# Patient Record
Sex: MALE | Race: WHITE | NOT HISPANIC OR LATINO | Employment: OTHER | URBAN - METROPOLITAN AREA
[De-identification: names, ages, dates, MRNs, and addresses within clinical notes are randomized per-mention and may not be internally consistent; named-entity substitution may affect disease eponyms.]

---

## 2017-03-17 ENCOUNTER — DOCUMENTATION ONLY (OUTPATIENT)
Dept: FAMILY MEDICINE | Facility: CLINIC | Age: 77
End: 2017-03-17

## 2019-09-12 ENCOUNTER — OFFICE VISIT (OUTPATIENT)
Dept: FAMILY MEDICINE | Facility: CLINIC | Age: 79
End: 2019-09-12
Payer: MEDICARE

## 2019-09-12 VITALS
DIASTOLIC BLOOD PRESSURE: 86 MMHG | HEART RATE: 60 BPM | SYSTOLIC BLOOD PRESSURE: 138 MMHG | WEIGHT: 240 LBS | HEIGHT: 76 IN | BODY MASS INDEX: 29.22 KG/M2

## 2019-09-12 DIAGNOSIS — N40.0 BENIGN PROSTATIC HYPERPLASIA, UNSPECIFIED WHETHER LOWER URINARY TRACT SYMPTOMS PRESENT: ICD-10-CM

## 2019-09-12 DIAGNOSIS — M15.9 PRIMARY OSTEOARTHRITIS INVOLVING MULTIPLE JOINTS: ICD-10-CM

## 2019-09-12 DIAGNOSIS — Z87.828 HISTORY OF TRAUMATIC INJURY OF HEAD: ICD-10-CM

## 2019-09-12 DIAGNOSIS — Z00.00 WELLNESS EXAMINATION: Primary | ICD-10-CM

## 2019-09-12 PROCEDURE — 99397 PR PREVENTIVE VISIT,EST,65 & OVER: ICD-10-PCS | Mod: S$GLB,,, | Performed by: FAMILY MEDICINE

## 2019-09-12 PROCEDURE — 99397 PER PM REEVAL EST PAT 65+ YR: CPT | Mod: S$GLB,,, | Performed by: FAMILY MEDICINE
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
## 2019-12-18 ENCOUNTER — ANESTHESIA (OUTPATIENT)
Dept: SURGERY | Facility: HOSPITAL | Age: 79
End: 2019-12-18
Payer: MEDICARE

## 2019-12-18 ENCOUNTER — ANESTHESIA EVENT (OUTPATIENT)
Dept: SURGERY | Facility: HOSPITAL | Age: 79
End: 2019-12-18
Payer: MEDICARE

## 2019-12-18 ENCOUNTER — HOSPITAL ENCOUNTER (OUTPATIENT)
Facility: HOSPITAL | Age: 79
Discharge: HOME | End: 2019-12-18
Attending: EMERGENCY MEDICINE | Admitting: FAMILY MEDICINE
Payer: MEDICARE

## 2019-12-18 VITALS
HEIGHT: 77 IN | DIASTOLIC BLOOD PRESSURE: 74 MMHG | SYSTOLIC BLOOD PRESSURE: 144 MMHG | HEART RATE: 62 BPM | TEMPERATURE: 98 F | BODY MASS INDEX: 27.75 KG/M2 | OXYGEN SATURATION: 96 % | WEIGHT: 235 LBS | RESPIRATION RATE: 16 BRPM

## 2019-12-18 DIAGNOSIS — S96.909A: ICD-10-CM

## 2019-12-18 DIAGNOSIS — S91.109A: Primary | ICD-10-CM

## 2019-12-18 DIAGNOSIS — T14.8XXA: ICD-10-CM

## 2019-12-18 DIAGNOSIS — S96.909A: Primary | ICD-10-CM

## 2019-12-18 DIAGNOSIS — T14.8XXA FRACTURE: ICD-10-CM

## 2019-12-18 DIAGNOSIS — S91.109A: ICD-10-CM

## 2019-12-18 LAB
ALBUMIN SERPL BCP-MCNC: 4.3 G/DL (ref 3.5–5.2)
ALP SERPL-CCNC: 63 U/L (ref 55–135)
ALT SERPL W/O P-5'-P-CCNC: 24 U/L (ref 10–44)
ANION GAP SERPL CALC-SCNC: 7 MMOL/L (ref 8–16)
APTT PPP: 32.6 SEC (ref 23.6–33.3)
AST SERPL-CCNC: 28 U/L (ref 10–40)
BASOPHILS # BLD AUTO: 0.05 K/UL (ref 0–0.2)
BASOPHILS NFR BLD: 1.1 % (ref 0–1.9)
BILIRUB SERPL-MCNC: 1.3 MG/DL (ref 0.1–1)
BILIRUB UR QL STRIP: NEGATIVE
BUN SERPL-MCNC: 18 MG/DL (ref 8–23)
CALCIUM SERPL-MCNC: 9 MG/DL (ref 8.7–10.5)
CHLORIDE SERPL-SCNC: 103 MMOL/L (ref 95–110)
CLARITY UR: CLEAR
CO2 SERPL-SCNC: 28 MMOL/L (ref 23–29)
COLOR UR: COLORLESS
CREAT SERPL-MCNC: 0.9 MG/DL (ref 0.5–1.4)
DIFFERENTIAL METHOD: ABNORMAL
EOSINOPHIL # BLD AUTO: 0.2 K/UL (ref 0–0.5)
EOSINOPHIL NFR BLD: 4 % (ref 0–8)
ERYTHROCYTE [DISTWIDTH] IN BLOOD BY AUTOMATED COUNT: 13 % (ref 11.5–14.5)
EST. GFR  (AFRICAN AMERICAN): >60 ML/MIN/1.73 M^2
EST. GFR  (NON AFRICAN AMERICAN): >60 ML/MIN/1.73 M^2
GLUCOSE SERPL-MCNC: 143 MG/DL (ref 70–110)
GLUCOSE UR QL STRIP: NEGATIVE
HCT VFR BLD AUTO: 45.6 % (ref 40–54)
HGB BLD-MCNC: 15.3 G/DL (ref 14–18)
HGB UR QL STRIP: NEGATIVE
IMM GRANULOCYTES # BLD AUTO: 0.01 K/UL (ref 0–0.04)
IMM GRANULOCYTES NFR BLD AUTO: 0.2 % (ref 0–0.5)
INR PPP: 1.1
KETONES UR QL STRIP: NEGATIVE
LEUKOCYTE ESTERASE UR QL STRIP: NEGATIVE
LYMPHOCYTES # BLD AUTO: 1.1 K/UL (ref 1–4.8)
LYMPHOCYTES NFR BLD: 24.1 % (ref 18–48)
MCH RBC QN AUTO: 30.5 PG (ref 27–31)
MCHC RBC AUTO-ENTMCNC: 33.6 G/DL (ref 32–36)
MCV RBC AUTO: 91 FL (ref 82–98)
MONOCYTES # BLD AUTO: 0.5 K/UL (ref 0.3–1)
MONOCYTES NFR BLD: 10.2 % (ref 4–15)
NEUTROPHILS # BLD AUTO: 2.7 K/UL (ref 1.8–7.7)
NEUTROPHILS NFR BLD: 60.4 % (ref 38–73)
NITRITE UR QL STRIP: NEGATIVE
NRBC BLD-RTO: 0 /100 WBC
PH UR STRIP: 7 [PH] (ref 5–8)
PLATELET # BLD AUTO: 133 K/UL (ref 150–350)
PMV BLD AUTO: 11 FL (ref 9.2–12.9)
POTASSIUM SERPL-SCNC: 4 MMOL/L (ref 3.5–5.1)
PROT SERPL-MCNC: 7 G/DL (ref 6–8.4)
PROT UR QL STRIP: NEGATIVE
PROTHROMBIN TIME: 13.6 SEC (ref 10.6–14.8)
RBC # BLD AUTO: 5.01 M/UL (ref 4.6–6.2)
SODIUM SERPL-SCNC: 138 MMOL/L (ref 136–145)
SP GR UR STRIP: 1.01 (ref 1–1.03)
URN SPEC COLLECT METH UR: ABNORMAL
UROBILINOGEN UR STRIP-ACNC: NEGATIVE EU/DL
WBC # BLD AUTO: 4.52 K/UL (ref 3.9–12.7)

## 2019-12-18 PROCEDURE — 71000039 HC RECOVERY, EACH ADD'L HOUR: Performed by: ORTHOPAEDIC SURGERY

## 2019-12-18 PROCEDURE — 37000008 HC ANESTHESIA 1ST 15 MINUTES: Performed by: ORTHOPAEDIC SURGERY

## 2019-12-18 PROCEDURE — 37000009 HC ANESTHESIA EA ADD 15 MINS: Performed by: ORTHOPAEDIC SURGERY

## 2019-12-18 PROCEDURE — 90471 IMMUNIZATION ADMIN: CPT | Performed by: EMERGENCY MEDICINE

## 2019-12-18 PROCEDURE — C1769 GUIDE WIRE: HCPCS | Performed by: ORTHOPAEDIC SURGERY

## 2019-12-18 PROCEDURE — 25000003 PHARM REV CODE 250: Performed by: NURSE ANESTHETIST, CERTIFIED REGISTERED

## 2019-12-18 PROCEDURE — 36415 COLL VENOUS BLD VENIPUNCTURE: CPT

## 2019-12-18 PROCEDURE — S0028 INJECTION, FAMOTIDINE, 20 MG: HCPCS | Performed by: NURSE ANESTHETIST, CERTIFIED REGISTERED

## 2019-12-18 PROCEDURE — 71000033 HC RECOVERY, INTIAL HOUR: Performed by: ORTHOPAEDIC SURGERY

## 2019-12-18 PROCEDURE — G0378 HOSPITAL OBSERVATION PER HR: HCPCS

## 2019-12-18 PROCEDURE — 25000003 PHARM REV CODE 250: Performed by: ANESTHESIOLOGY

## 2019-12-18 PROCEDURE — 27000671 HC TUBING MICROBORE EXT: Performed by: ANESTHESIOLOGY

## 2019-12-18 PROCEDURE — 80053 COMPREHEN METABOLIC PANEL: CPT

## 2019-12-18 PROCEDURE — 83036 HEMOGLOBIN GLYCOSYLATED A1C: CPT

## 2019-12-18 PROCEDURE — 27201423 OPTIME MED/SURG SUP & DEVICES STERILE SUPPLY: Performed by: ORTHOPAEDIC SURGERY

## 2019-12-18 PROCEDURE — 27201107 HC STYLET, STANDARD: Performed by: ANESTHESIOLOGY

## 2019-12-18 PROCEDURE — 28208 PR REPAIR EXTEN LEG TENDON,PRIM,EA: ICD-10-PCS | Mod: 51,LT,, | Performed by: ORTHOPAEDIC SURGERY

## 2019-12-18 PROCEDURE — 12000002 HC ACUTE/MED SURGE SEMI-PRIVATE ROOM

## 2019-12-18 PROCEDURE — 27202107 HC XP QUATRO SENSOR: Performed by: ANESTHESIOLOGY

## 2019-12-18 PROCEDURE — 85025 COMPLETE CBC W/AUTO DIFF WBC: CPT

## 2019-12-18 PROCEDURE — 36000707: Performed by: ORTHOPAEDIC SURGERY

## 2019-12-18 PROCEDURE — 93005 ELECTROCARDIOGRAM TRACING: CPT

## 2019-12-18 PROCEDURE — 63600175 PHARM REV CODE 636 W HCPCS: Performed by: EMERGENCY MEDICINE

## 2019-12-18 PROCEDURE — 27000080 OPTIME MED/SURG SUP & DEVICES GENERAL CLASSIFICATION: Performed by: ORTHOPAEDIC SURGERY

## 2019-12-18 PROCEDURE — 96374 THER/PROPH/DIAG INJ IV PUSH: CPT

## 2019-12-18 PROCEDURE — 63600175 PHARM REV CODE 636 W HCPCS: Performed by: ORTHOPAEDIC SURGERY

## 2019-12-18 PROCEDURE — 81003 URINALYSIS AUTO W/O SCOPE: CPT

## 2019-12-18 PROCEDURE — 28505 PR OPEN TX FRACTURE GREAT TOE/PHALANX/PHALANGES: ICD-10-PCS | Mod: TA,,, | Performed by: ORTHOPAEDIC SURGERY

## 2019-12-18 PROCEDURE — 99285 EMERGENCY DEPT VISIT HI MDM: CPT | Mod: 25

## 2019-12-18 PROCEDURE — 28505 TREAT BIG TOE FRACTURE: CPT | Mod: TA,,, | Performed by: ORTHOPAEDIC SURGERY

## 2019-12-18 PROCEDURE — 85610 PROTHROMBIN TIME: CPT

## 2019-12-18 PROCEDURE — 25000003 PHARM REV CODE 250: Performed by: ORTHOPAEDIC SURGERY

## 2019-12-18 PROCEDURE — 36000706: Performed by: ORTHOPAEDIC SURGERY

## 2019-12-18 PROCEDURE — 28208 REPAIR OF FOOT TENDON: CPT | Mod: 51,LT,, | Performed by: ORTHOPAEDIC SURGERY

## 2019-12-18 PROCEDURE — 90714 TD VACC NO PRESV 7 YRS+ IM: CPT | Performed by: EMERGENCY MEDICINE

## 2019-12-18 PROCEDURE — 85730 THROMBOPLASTIN TIME PARTIAL: CPT

## 2019-12-18 PROCEDURE — 63600175 PHARM REV CODE 636 W HCPCS: Performed by: NURSE ANESTHETIST, CERTIFIED REGISTERED

## 2019-12-18 PROCEDURE — 27000673 HC TUBING BLOOD Y: Performed by: ANESTHESIOLOGY

## 2019-12-18 RX ORDER — FENTANYL CITRATE 50 UG/ML
INJECTION, SOLUTION INTRAMUSCULAR; INTRAVENOUS
Status: DISCONTINUED | OUTPATIENT
Start: 2019-12-18 | End: 2019-12-18

## 2019-12-18 RX ORDER — FAMOTIDINE 10 MG/ML
INJECTION INTRAVENOUS
Status: DISCONTINUED | OUTPATIENT
Start: 2019-12-18 | End: 2019-12-18

## 2019-12-18 RX ORDER — LIDOCAINE HYDROCHLORIDE 20 MG/ML
JELLY TOPICAL
Status: DISCONTINUED | OUTPATIENT
Start: 2019-12-18 | End: 2019-12-18

## 2019-12-18 RX ORDER — SODIUM,POTASSIUM PHOSPHATES 280-250MG
2 POWDER IN PACKET (EA) ORAL
Status: DISCONTINUED | OUTPATIENT
Start: 2019-12-18 | End: 2019-12-18 | Stop reason: HOSPADM

## 2019-12-18 RX ORDER — ACETAMINOPHEN 325 MG/1
650 TABLET ORAL EVERY 4 HOURS PRN
Status: DISCONTINUED | OUTPATIENT
Start: 2019-12-18 | End: 2019-12-18 | Stop reason: HOSPADM

## 2019-12-18 RX ORDER — POTASSIUM CHLORIDE 20 MEQ/15ML
40 SOLUTION ORAL
Status: DISCONTINUED | OUTPATIENT
Start: 2019-12-18 | End: 2019-12-18 | Stop reason: HOSPADM

## 2019-12-18 RX ORDER — ASPIRIN 81 MG/1
1 TABLET ORAL DAILY
COMMUNITY

## 2019-12-18 RX ORDER — SUCCINYLCHOLINE CHLORIDE 20 MG/ML
INJECTION INTRAMUSCULAR; INTRAVENOUS
Status: DISCONTINUED | OUTPATIENT
Start: 2019-12-18 | End: 2019-12-18

## 2019-12-18 RX ORDER — SODIUM CHLORIDE, SODIUM LACTATE, POTASSIUM CHLORIDE, CALCIUM CHLORIDE 600; 310; 30; 20 MG/100ML; MG/100ML; MG/100ML; MG/100ML
INJECTION, SOLUTION INTRAVENOUS CONTINUOUS PRN
Status: DISCONTINUED | OUTPATIENT
Start: 2019-12-18 | End: 2019-12-18

## 2019-12-18 RX ORDER — POTASSIUM CHLORIDE 20 MEQ/15ML
60 SOLUTION ORAL
Status: DISCONTINUED | OUTPATIENT
Start: 2019-12-18 | End: 2019-12-18 | Stop reason: HOSPADM

## 2019-12-18 RX ORDER — GLUCAGON 1 MG
1 KIT INJECTION
Status: DISCONTINUED | OUTPATIENT
Start: 2019-12-18 | End: 2019-12-18 | Stop reason: HOSPADM

## 2019-12-18 RX ORDER — CEFAZOLIN SODIUM 1 G/50ML
1 SOLUTION INTRAVENOUS
Status: DISCONTINUED | OUTPATIENT
Start: 2019-12-18 | End: 2019-12-18 | Stop reason: HOSPADM

## 2019-12-18 RX ORDER — FENTANYL CITRATE 50 UG/ML
25 INJECTION, SOLUTION INTRAMUSCULAR; INTRAVENOUS
Status: DISCONTINUED | OUTPATIENT
Start: 2019-12-18 | End: 2019-12-18 | Stop reason: HOSPADM

## 2019-12-18 RX ORDER — ACETAMINOPHEN 500 MG
1000 TABLET ORAL
Status: COMPLETED | OUTPATIENT
Start: 2019-12-18 | End: 2019-12-18

## 2019-12-18 RX ORDER — ONDANSETRON 2 MG/ML
4 INJECTION INTRAMUSCULAR; INTRAVENOUS EVERY 8 HOURS PRN
Status: DISCONTINUED | OUTPATIENT
Start: 2019-12-18 | End: 2019-12-18 | Stop reason: HOSPADM

## 2019-12-18 RX ORDER — PROPOFOL 10 MG/ML
VIAL (ML) INTRAVENOUS
Status: DISCONTINUED | OUTPATIENT
Start: 2019-12-18 | End: 2019-12-18

## 2019-12-18 RX ORDER — SODIUM CHLORIDE 0.9 % (FLUSH) 0.9 %
10 SYRINGE (ML) INJECTION
Status: DISCONTINUED | OUTPATIENT
Start: 2019-12-18 | End: 2019-12-18 | Stop reason: HOSPADM

## 2019-12-18 RX ORDER — ONDANSETRON 2 MG/ML
INJECTION INTRAMUSCULAR; INTRAVENOUS
Status: DISCONTINUED | OUTPATIENT
Start: 2019-12-18 | End: 2019-12-18

## 2019-12-18 RX ORDER — ACETAMINOPHEN 325 MG/1
650 TABLET ORAL EVERY 8 HOURS PRN
Status: DISCONTINUED | OUTPATIENT
Start: 2019-12-18 | End: 2019-12-18 | Stop reason: HOSPADM

## 2019-12-18 RX ORDER — SODIUM CHLORIDE 0.9 G/100ML
IRRIGANT IRRIGATION
Status: DISCONTINUED | OUTPATIENT
Start: 2019-12-18 | End: 2019-12-18 | Stop reason: HOSPADM

## 2019-12-18 RX ORDER — LANOLIN ALCOHOL/MO/W.PET/CERES
800 CREAM (GRAM) TOPICAL
Status: DISCONTINUED | OUTPATIENT
Start: 2019-12-18 | End: 2019-12-18 | Stop reason: HOSPADM

## 2019-12-18 RX ORDER — IBUPROFEN 200 MG
24 TABLET ORAL
Status: DISCONTINUED | OUTPATIENT
Start: 2019-12-18 | End: 2019-12-18 | Stop reason: HOSPADM

## 2019-12-18 RX ORDER — ROCURONIUM BROMIDE 10 MG/ML
INJECTION, SOLUTION INTRAVENOUS
Status: DISCONTINUED | OUTPATIENT
Start: 2019-12-18 | End: 2019-12-18

## 2019-12-18 RX ORDER — ONDANSETRON 2 MG/ML
4 INJECTION INTRAMUSCULAR; INTRAVENOUS DAILY PRN
Status: DISCONTINUED | OUTPATIENT
Start: 2019-12-18 | End: 2019-12-18 | Stop reason: HOSPADM

## 2019-12-18 RX ORDER — POLYETHYLENE GLYCOL 3350 17 G/17G
17 POWDER, FOR SOLUTION ORAL 2 TIMES DAILY PRN
Status: DISCONTINUED | OUTPATIENT
Start: 2019-12-18 | End: 2019-12-18 | Stop reason: HOSPADM

## 2019-12-18 RX ORDER — DIPHENHYDRAMINE HYDROCHLORIDE 50 MG/ML
6.25 INJECTION INTRAMUSCULAR; INTRAVENOUS
Status: DISCONTINUED | OUTPATIENT
Start: 2019-12-18 | End: 2019-12-18 | Stop reason: HOSPADM

## 2019-12-18 RX ORDER — OXYCODONE HYDROCHLORIDE 5 MG/1
5 TABLET ORAL
Status: DISCONTINUED | OUTPATIENT
Start: 2019-12-18 | End: 2019-12-18 | Stop reason: HOSPADM

## 2019-12-18 RX ORDER — CEFAZOLIN SODIUM 1 G/3ML
1 INJECTION, POWDER, FOR SOLUTION INTRAMUSCULAR; INTRAVENOUS
Status: COMPLETED | OUTPATIENT
Start: 2019-12-18 | End: 2019-12-18

## 2019-12-18 RX ORDER — LIDOCAINE HCL/PF 100 MG/5ML
SYRINGE (ML) INTRAVENOUS
Status: DISCONTINUED | OUTPATIENT
Start: 2019-12-18 | End: 2019-12-18

## 2019-12-18 RX ORDER — CEFAZOLIN SODIUM 2 G/50ML
2 SOLUTION INTRAVENOUS
Status: COMPLETED | OUTPATIENT
Start: 2019-12-18 | End: 2019-12-18

## 2019-12-18 RX ORDER — IBUPROFEN 200 MG
16 TABLET ORAL
Status: DISCONTINUED | OUTPATIENT
Start: 2019-12-18 | End: 2019-12-18 | Stop reason: HOSPADM

## 2019-12-18 RX ORDER — MORPHINE SULFATE 4 MG/ML
4 INJECTION, SOLUTION INTRAMUSCULAR; INTRAVENOUS EVERY 4 HOURS PRN
Status: DISCONTINUED | OUTPATIENT
Start: 2019-12-18 | End: 2019-12-18 | Stop reason: HOSPADM

## 2019-12-18 RX ADMIN — CEFAZOLIN 1 G: 330 INJECTION, POWDER, FOR SOLUTION INTRAMUSCULAR; INTRAVENOUS at 10:12

## 2019-12-18 RX ADMIN — SODIUM CHLORIDE, SODIUM LACTATE, POTASSIUM CHLORIDE, AND CALCIUM CHLORIDE: .6; .31; .03; .02 INJECTION, SOLUTION INTRAVENOUS at 01:12

## 2019-12-18 RX ADMIN — FENTANYL CITRATE 50 MCG: 50 INJECTION INTRAMUSCULAR; INTRAVENOUS at 12:12

## 2019-12-18 RX ADMIN — SUCCINYLCHOLINE CHLORIDE 140 MG: 20 INJECTION, SOLUTION INTRAMUSCULAR; INTRAVENOUS at 12:12

## 2019-12-18 RX ADMIN — SODIUM CHLORIDE, SODIUM LACTATE, POTASSIUM CHLORIDE, AND CALCIUM CHLORIDE: .6; .31; .03; .02 INJECTION, SOLUTION INTRAVENOUS at 12:12

## 2019-12-18 RX ADMIN — PROPOFOL 110 MG: 10 INJECTION, EMULSION INTRAVENOUS at 12:12

## 2019-12-18 RX ADMIN — ACETAMINOPHEN 1000 MG: 500 TABLET, FILM COATED ORAL at 12:12

## 2019-12-18 RX ADMIN — LIDOCAINE HYDROCHLORIDE 80 MG: 20 INJECTION, SOLUTION INTRAVENOUS at 12:12

## 2019-12-18 RX ADMIN — TETANUS AND DIPHTHERIA TOXOIDS ADSORBED 0.5 ML: 2; 2 INJECTION INTRAMUSCULAR at 08:12

## 2019-12-18 RX ADMIN — ONDANSETRON 4 MG: 2 INJECTION INTRAMUSCULAR; INTRAVENOUS at 12:12

## 2019-12-18 RX ADMIN — LIDOCAINE HYDROCHLORIDE 3 ML: 20 JELLY TOPICAL at 01:12

## 2019-12-18 RX ADMIN — CEFAZOLIN SODIUM 2 G: 2 SOLUTION INTRAVENOUS at 12:12

## 2019-12-18 RX ADMIN — FAMOTIDINE 20 MG: 10 INJECTION, SOLUTION INTRAVENOUS at 01:12

## 2019-12-18 RX ADMIN — ROCURONIUM BROMIDE 5 MG: 10 INJECTION, SOLUTION INTRAVENOUS at 12:12

## 2019-12-18 NOTE — ED PROVIDER NOTES
Encounter Date: 12/18/2019       History     Chief Complaint   Patient presents with    Toe Injury     This 79-year-old male who has a benign past medical history, presents emergency room with a history that this morning at approximately 4:00 a.m., he was walking outside with an open-toe shoe and stubbed his left great toe on the edge of a concrete no sustained abrasions to the dorsum his remaining 2nd through 4th toes.  The patient sustained a large laceration over the dorsum of his hallux in the also has an inability to extend the distal phalanx.  Tetanus status is not up-to-date.  No complaints of any pain to the metatarsals or calcaneus and bimalleolar area.  No complaints of any sensory changes to the toes.  Tetanus status is not up-to-date.        Review of patient's allergies indicates:  No Known Allergies  History reviewed. No pertinent past medical history.  Past Surgical History:   Procedure Laterality Date    APPENDECTOMY       Family History   Problem Relation Age of Onset    Diabetes Father      Social History     Tobacco Use    Smoking status: Never Smoker    Smokeless tobacco: Never Used   Substance Use Topics    Alcohol use: Not on file    Drug use: Not on file     Review of Systems   Musculoskeletal: Positive for arthralgias, joint swelling and myalgias. Negative for neck pain.   Skin: Positive for wound. Negative for color change.        Laceration over the dorsum of the left hallux   Neurological: Negative for numbness.   All other systems reviewed and are negative.      Physical Exam     Initial Vitals [12/18/19 0722]   BP Pulse Resp Temp SpO2   (!) 187/84 (!) 56 20 97.6 °F (36.4 °C) 97 %      MAP       --         Physical Exam    Constitutional: He appears well-developed and well-nourished. He is not diaphoretic. No distress.   Cardiovascular: Intact distal pulses.   Musculoskeletal: Normal range of motion. He exhibits no edema or tenderness.   Left great toe has a transverse laceration  over the dorsum with some active venous bleeding prior area a nail appears to be normal. The patient is unable to extend the distal phalanx of that toe.  The proximal phalanx is easily visualized.  The neurovascular status appears intact.   Neurological: GCS eye subscore is 4. GCS verbal subscore is 5. GCS motor subscore is 6.   Skin: Capillary refill takes less than 2 seconds. No rash noted.         ED Course   Procedures  Labs Reviewed   CBC W/ AUTO DIFFERENTIAL - Abnormal; Notable for the following components:       Result Value    Platelets 133 (*)     All other components within normal limits   COMPREHENSIVE METABOLIC PANEL - Abnormal; Notable for the following components:    Glucose 143 (*)     Total Bilirubin 1.3 (*)     Anion Gap 7 (*)     All other components within normal limits   URINALYSIS, REFLEX TO URINE CULTURE - Abnormal; Notable for the following components:    Color, UA Colorless (*)     All other components within normal limits    Narrative:     Preferred Collection Type->Urine, Clean Catch  Specimen Source->Urine   PROTIME-INR   APTT        ECG Results          EKG 12-lead (In process)  Result time 12/18/19 09:14:11    In process by Interface, Lab In [facility] (12/18/19 09:14:11)                 Narrative:    Test Reason : T14.8XXA,    Vent. Rate : 054 BPM     Atrial Rate : 054 BPM     P-R Int : 210 ms          QRS Dur : 100 ms      QT Int : 460 ms       P-R-T Axes : 013 -06 000 degrees     QTc Int : 436 ms    Sinus bradycardia with sinus arrhythmia with 1st degree A-V block  Low voltage QRS  Borderline Abnormal ECG  No previous ECGs available    Referred By: [email or web address]   SELF           Confirmed By:                             Imaging Results          X-Ray Chest AP Portable (Final result)  Result time 12/18/19 09:18:52    Final result by Naseem Camacho MD (12/18/19 09:18:52)                 Impression:      No evidence of active cardiopulmonary disease.      Electronically signed by: aNseem Camacho  MD  Date:    12/18/2019  Time:    09:18             Narrative:    EXAMINATION:  XR CHEST AP PORTABLE    CLINICAL HISTORY:  Toe injury, preoperative exam.    FINDINGS:  Portable chest radiograph at 08:55 hours compared to 03/02/2017 shows the cardiomediastinal silhouette and pulmonary vasculature are within normal limits.    The lungs are normally expanded, with no consolidation, pleural effusion, or evidence of pulmonary edema. No confluent infiltrates or pneumothorax. There are no significant osseous abnormalities.                               X-Ray Foot Complete Left (Final result)  Result time 12/18/19 08:31:40    Final result by Naseem Camacho MD (12/18/19 08:31:40)                 Impression:      Negative for acute fracture or dislocation.      Electronically signed by: Naseem Camacho MD  Date:    12/18/2019  Time:    08:31             Narrative:    EXAMINATION:  XR FOOT COMPLETE 3 VIEW LEFT    CLINICAL HISTORY:  Left great toe injury fell on concrete.    FINDINGS:  Three views of the left show no acute fracture, dislocation or destructive osseous lesion.  There is degenerative joint space narrowing, with osteophytes.  Well corticated ossific fragment along the medial aspect of the interphalangeal joint of the great toe is nonspecific.  Bony mineralization is normal.  No radiopaque foreign bodies.                                              Attending Attestation:             Attending ED Notes:   X-ray of the left foot and great toe had negative for any acute fracture.  During ED course the patient received a TT immunization.  He received 1 g of Ancef IV.  The on-call orthopedic surgeon, Dr. netta king ultimately saw the patient in the ED with plans on taking and floor.  It appears clinically that he has avulsed extensor tendon to the distal phalanx of that great toe.                        Clinical Impression:       ICD-10-CM ICD-9-CM   1. Open wound of toe with tendon involvement, initial encounter  S91.109A 893.2    S96.999A    2. Fracture T14.8XXA 829.0   3. Open dislocation T14.8XXA 839.9   4. Wound, open, toe with tendon involvement, initial encounter S91.109A 893.2    S96.999A                              Farzad Carlin Jr., MD  12/18/19 9171

## 2019-12-18 NOTE — H&P
[facility]  Consult Note  12/18/2019      No past medical history on file.    Past Surgical History:   Procedure Laterality Date    APPENDECTOMY           Current Facility-Administered Medications:     ceFAZolin injection 1 g, 1 g, Intravenous, ED 1 Time, Farzad Carlin Jr., MD  No current outpatient medications on file.    Allergies as of 12/18/2019    (No Known Allergies)       Family History   Problem Relation Age of Onset    Diabetes Father        Social History     Socioeconomic History    Marital status: Single     Spouse name: Not on file    Number of children: Not on file    Years of education: Not on file    Highest education level: Not on file   Occupational History    Not on file   Social Needs    Financial resource strain: Not on file    Food insecurity:     Worry: Not on file     Inability: Not on file    Transportation needs:     Medical: Not on file     Non-medical: Not on file   Tobacco Use    Smoking status: Never Smoker    Smokeless tobacco: Never Used   Substance and Sexual Activity    Alcohol use: Not on file    Drug use: Not on file    Sexual activity: Not on file   Lifestyle    Physical activity:     Days per week: Not on file     Minutes per session: Not on file    Stress: Not on file   Relationships    Social connections:     Talks on phone: Not on file     Gets together: Not on file     Attends Religion service: Not on file     Active member of club or organization: Not on file     Attends meetings of clubs or organizations: Not on file     Relationship status: Not on file   Other Topics Concern    Not on file   Social History Narrative    Not on file         HPI:  Who was walking in his driveway today with his slippers on and stubbed his left great toe patient sustained an injury to the left great toe      Review of Systems   Constitution: Negative for chills and fever.   HENT: Negative for headaches and blurry vision.   Cardiovascular: Negative for  chest pain, irregular heartbeat, leg swelling and palpitations.   Respiratory: Negative for cough and shortness of breath.   Endocrine: Negative for polyuria.   Hematologic/Lymphatic: Negative for bleeding problem. Does not bruise/bleed easily.   Skin: Negative for poor wound healing and rash.   Musculoskeletal: Negative for joint pain. Negative for arthritis, joint swelling, muscle weakness and myalgias.   Gastrointestinal: Negative for abdominal pain, heartburn, melena, nausea and vomiting.   Genitourinary: Negative for bladder incontinence and dysuria.   Neurological: Negative for numbness.   Psychiatric/Behavioral: Negative for depression. The patient is not nervous/anxious.     PE:  Temp:  [97.6 °F (36.4 °C)] 97.6 °F (36.4 °C)  Pulse:  [54-59] 57  Resp:  [20] 20  SpO2:  [95 %-99 %] 98 %  BP: (146-199)/(74-92) 199/92    A&Ox3, NAD  Neck-neck is supple with no pain  Left upper extremity has no pain or swelling or deformity  RUE-no swelling or deformity  LUE-no swelling or deformity  Pelvis-no hip pain no deformity  Back-no back pain straight leg raise negative  RLE-no swelling or deformity  LLE-examination of the left great toe shows a large transverse laceration over the IP joint with a mallet deformity with the open DIP joint and extensor tendon avulsion    Xrays:  X-rays show no fractures or distal no fractures of the great toe left  Imaging Results          X-Ray Chest AP Portable (Final result)  Result time 12/18/19 09:18:52    Final result by Naseem Camacho MD (12/18/19 09:18:52)                 Impression:      No evidence of active cardiopulmonary disease.      Electronically signed by: Naseem Camacho MD  Date:    12/18/2019  Time:    09:18             Narrative:    EXAMINATION:  XR CHEST AP PORTABLE    CLINICAL HISTORY:  Toe injury, preoperative exam.    FINDINGS:  Portable chest radiograph at 08:55 hours compared to 03/02/2017 shows the cardiomediastinal silhouette and pulmonary vasculature are within  normal limits.    The lungs are normally expanded, with no consolidation, pleural effusion, or evidence of pulmonary edema. No confluent infiltrates or pneumothorax. There are no significant osseous abnormalities.                               X-Ray Foot Complete Left (Final result)  Result time 12/18/19 08:31:40    Final result by Naseem Camacho MD (12/18/19 08:31:40)                 Impression:      Negative for acute fracture or dislocation.      Electronically signed by: Naseem Camacho MD  Date:    12/18/2019  Time:    08:31             Narrative:    EXAMINATION:  XR FOOT COMPLETE 3 VIEW LEFT    CLINICAL HISTORY:  Left great toe injury fell on concrete.    FINDINGS:  Three views of the left show no acute fracture, dislocation or destructive osseous lesion.  There is degenerative joint space narrowing, with osteophytes.  Well corticated ossific fragment along the medial aspect of the interphalangeal joint of the great toe is nonspecific.  Bony mineralization is normal.  No radiopaque foreign bodies.                                Labs:    Recent Results (from the past 24 hour(s))   Urinalysis, Reflex to Urine Culture Urine, Clean Catch    Collection Time: 12/18/19  9:10 AM   Result Value Ref Range    Specimen UA Urine, Clean Catch     Color, UA Colorless (A) Yellow, Straw, Marianne    Appearance, UA Clear Clear    pH, UA 7.0 5.0 - 8.0    Specific Gravity, UA 1.010 1.005 - 1.030    Protein, UA Negative Negative    Glucose, UA Negative Negative    Ketones, UA Negative Negative    Bilirubin (UA) Negative Negative    Occult Blood UA Negative Negative    Nitrite, UA Negative Negative    Urobilinogen, UA Negative Negative EU/dL    Leukocytes, UA Negative Negative   CBC auto differential    Collection Time: 12/18/19  9:29 AM   Result Value Ref Range    WBC 4.52 3.90 - 12.70 K/uL    RBC 5.01 4.60 - 6.20 M/uL    Hemoglobin 15.3 14.0 - 18.0 g/dL    Hematocrit 45.6 40.0 - 54.0 %    Mean Corpuscular Volume 91 82 - 98 fL    Mean  Corpuscular Hemoglobin 30.5 27.0 - 31.0 pg    Mean Corpuscular Hemoglobin Conc 33.6 32.0 - 36.0 g/dL    RDW 13.0 11.5 - 14.5 %    Platelets 133 (L) 150 - 350 K/uL    MPV 11.0 9.2 - 12.9 fL    Immature Granulocytes 0.2 0.0 - 0.5 %    Gran # (ANC) 2.7 1.8 - 7.7 K/uL    Immature Grans (Abs) 0.01 0.00 - 0.04 K/uL    Lymph # 1.1 1.0 - 4.8 K/uL    Mono # 0.5 0.3 - 1.0 K/uL    Eos # 0.2 0.0 - 0.5 K/uL    Baso # 0.05 0.00 - 0.20 K/uL    nRBC 0 0 /100 WBC    Gran% 60.4 38.0 - 73.0 %    Lymph% 24.1 18.0 - 48.0 %    Mono% 10.2 4.0 - 15.0 %    Eosinophil% 4.0 0.0 - 8.0 %    Basophil% 1.1 0.0 - 1.9 %    Differential Method Automated    Comprehensive metabolic panel    Collection Time: 12/18/19  9:29 AM   Result Value Ref Range    Sodium 138 136 - 145 mmol/L    Potassium 4.0 3.5 - 5.1 mmol/L    Chloride 103 95 - 110 mmol/L    CO2 28 23 - 29 mmol/L    Glucose 143 (H) 70 - 110 mg/dL    BUN, Bld 18 8 - 23 mg/dL    Creatinine 0.9 0.5 - 1.4 mg/dL    Calcium 9.0 8.7 - 10.5 mg/dL    Total Protein 7.0 6.0 - 8.4 g/dL    Albumin 4.3 3.5 - 5.2 g/dL    Total Bilirubin 1.3 (H) 0.1 - 1.0 mg/dL    Alkaline Phosphatase 63 55 - 135 U/L    AST 28 10 - 40 U/L    ALT 24 10 - 44 U/L    Anion Gap 7 (L) 8 - 16 mmol/L    eGFR if African American >60.0 >60 mL/min/1.73 m^2    eGFR if non African American >60.0 >60 mL/min/1.73 m^2   Protime-INR    Collection Time: 12/18/19  9:29 AM   Result Value Ref Range    PT 13.6 10.6 - 14.8 sec    INR 1.1    APTT    Collection Time: 12/18/19  9:29 AM   Result Value Ref Range    aPTT 32.6 23.6 - 33.3 sec       Assessment/Plan:  Open laceration open IP joint and extensor tendon laceration of involved shin to the left great toe recommend irrigation and debridement of the wound and repair of the extensor tendon with pinning of the IP joint in extension.

## 2019-12-18 NOTE — ANESTHESIA POSTPROCEDURE EVALUATION
Anesthesia Post Evaluation    Patient: Kartik Hermosillo    Procedure(s) Performed: Procedure(s) (LRB):  REPAIR, TENDON, EXTENSOR (Left)  INCISION AND DRAINAGE (Left)    Final Anesthesia Type: general    Patient location during evaluation: PACU  Patient participation: Yes- Able to Participate  Level of consciousness: awake and alert, oriented and awake  Post-procedure vital signs: reviewed and stable  Pain management: adequate  Airway patency: patent    PONV status at discharge: No PONV  Anesthetic complications: no      Cardiovascular status: blood pressure returned to baseline, hemodynamically stable and stable  Respiratory status: unassisted, spontaneous ventilation and room air  Hydration status: euvolemic  Follow-up not needed.          Vitals Value Taken Time   /74 12/18/2019  2:30 PM   Temp 36.6 °C (97.8 °F) 12/18/2019  2:15 PM   Pulse 59 12/18/2019  2:32 PM   Resp 12 12/18/2019  2:32 PM   SpO2 95 % 12/18/2019  2:32 PM   Vitals shown include unvalidated device data.      No case tracking events are documented in the log.      Pain/Nati Score: Pain Rating Prior to Med Admin: 0 (12/18/2019 12:51 PM)  Nati Score: 10 (12/18/2019  2:15 PM)

## 2019-12-18 NOTE — ANESTHESIA PREPROCEDURE EVALUATION
12/18/2019  Kartik Hermosillo is a 79 y.o., male.  Patient Active Problem List   Diagnosis    History of traumatic injury of head    Open wound of toe with tendon involvement    Wound, open, toe with tendon involvement, initial encounter       Past Surgical History:   Procedure Laterality Date    APPENDECTOMY          Tobacco Use:  The patient  reports that he has never smoked. He has never used smokeless tobacco.     Results for orders placed or performed during the hospital encounter of 12/18/19   EKG 12-lead    Collection Time: 12/18/19  9:07 AM    Narrative    Test Reason : T14.8XXA,    Vent. Rate : 054 BPM     Atrial Rate : 054 BPM     P-R Int : 210 ms          QRS Dur : 100 ms      QT Int : 460 ms       P-R-T Axes : 013 -06 000 degrees     QTc Int : 436 ms    Sinus bradycardia with sinus arrhythmia with 1st degree A-V block  Low voltage QRS  Borderline Abnormal ECG  No previous ECGs available    Referred By: [email or web address]   SELF           Confirmed By:         Imaging Results          X-Ray Chest AP Portable (Final result)  Result time 12/18/19 09:18:52    Final result by Naseem Camacho MD (12/18/19 09:18:52)                 Impression:      No evidence of active cardiopulmonary disease.      Electronically signed by: Naseem Camacho MD  Date:    12/18/2019  Time:    09:18             Narrative:    EXAMINATION:  XR CHEST AP PORTABLE    CLINICAL HISTORY:  Toe injury, preoperative exam.    FINDINGS:  Portable chest radiograph at 08:55 hours compared to 03/02/2017 shows the cardiomediastinal silhouette and pulmonary vasculature are within normal limits.    The lungs are normally expanded, with no consolidation, pleural effusion, or evidence of pulmonary edema. No confluent infiltrates or pneumothorax. There are no significant osseous abnormalities.                               X-Ray Foot Complete Left (Final  result)  Result time 12/18/19 08:31:40    Final result by Naseem Camacho MD (12/18/19 08:31:40)                 Impression:      Negative for acute fracture or dislocation.      Electronically signed by: Naseem Camacho MD  Date:    12/18/2019  Time:    08:31             Narrative:    EXAMINATION:  XR FOOT COMPLETE 3 VIEW LEFT    CLINICAL HISTORY:  Left great toe injury fell on concrete.    FINDINGS:  Three views of the left show no acute fracture, dislocation or destructive osseous lesion.  There is degenerative joint space narrowing, with osteophytes.  Well corticated ossific fragment along the medial aspect of the interphalangeal joint of the great toe is nonspecific.  Bony mineralization is normal.  No radiopaque foreign bodies.                                 Lab Results   Component Value Date    WBC 4.52 12/18/2019    HGB 15.3 12/18/2019    HCT 45.6 12/18/2019    MCV 91 12/18/2019     (L) 12/18/2019     BMP  Lab Results   Component Value Date     12/18/2019    K 4.0 12/18/2019     12/18/2019    CO2 28 12/18/2019    BUN 18 12/18/2019    CREATININE 0.9 12/18/2019    CALCIUM 9.0 12/18/2019    ANIONGAP 7 (L) 12/18/2019    ESTGFRAFRICA >60.0 12/18/2019    EGFRNONAA >60.0 12/18/2019         Anesthesia Evaluation    I have reviewed the Patient Summary Reports.    I have reviewed the Nursing Notes.   I have reviewed the Medications.     Review of Systems  Anesthesia Hx:  No problems with previous Anesthesia Denies Hx of Anesthetic complications  History of prior surgery of interest to airway management or planning: Denies Family Hx of Anesthesia complications.   Denies Personal Hx of Anesthesia complications.   Social:  No Alcohol Use, Former Smoker    Hematology/Oncology:  Hematology Normal   Oncology Normal     EENT/Dental:EENT/Dental Normal   Cardiovascular:   Hypertension, well controlled ECG has been reviewed.    Pulmonary:  Pulmonary Normal    Renal/:  Renal/ Normal      Hepatic/GI:  Hepatic/GI Normal    Musculoskeletal:   Patient denies radicular pain at this time Spine Disorders: lumbar Chronic Pain    Neurological:   Bilateral foot neuropathy  Chronic Pain Syndrome  Peripheral Neuropathy    Endocrine:   Diabetes, well controlled, type 2 Pre-diabetic No Meds at this time   Dermatological:  Skin Normal    Psych:  Psychiatric Normal           Physical Exam  General:  Well nourished    Airway/Jaw/Neck:  Airway Findings: Mouth Opening: Normal Tongue: Normal  General Airway Assessment: Adult  Mallampati: III  Improves to III with phonation.  TM Distance: < 4 cm  Jaw/Neck Findings:  Neck ROM: Normal ROM      Dental:  Dental Findings: Molar caps, In tact, Upper front caps    Chest/Lungs:  Chest/Lungs Findings: Clear to auscultation, Normal Respiratory Rate     Heart/Vascular:  Heart Findings: Rate: Normal  Rhythm: Regular Rhythm  Sounds: Normal  Heart murmur: negative Vascular Findings: Normal    Abdomen:  Abdomen Findings: Normal    Musculoskeletal:  Musculoskeletal Findings: Normal   Skin:  Skin Findings: Normal    Mental Status:  Mental Status Findings:  Cooperative, Alert and Oriented         Anesthesia Plan  Type of Anesthesia, risks & benefits discussed:  Anesthesia Type:  general  Patient's Preference: General  Intra-op Monitoring Plan: standard ASA monitors  Intra-op Monitoring Plan Comments:   Post Op Pain Control Plan: multimodal analgesia, IV/PO Opioids PRN and per primary service following discharge from PACU  Post Op Pain Control Plan Comments:   Induction:   IV  Beta Blocker:  Patient is not currently on a Beta-Blocker (No further documentation required).       Informed Consent: Patient understands risks and agrees with Anesthesia plan.  Questions answered. Anesthesia consent signed with patient.  ASA Score: 2     Day of Surgery Review of History & Physical:        Anesthesia Plan Notes: GETA  Zofran 4mg iv  Pepcid 20mg iv   Tylenol 1000mg po         Ready For Surgery  From Anesthesia Perspective.

## 2019-12-18 NOTE — NURSING
Gave pt discharge instrutions, pt verbalized understanding, discharged with crutches and prescriptions.

## 2019-12-18 NOTE — ED TRIAGE NOTES
Pt states he stubbed his left 1st toe this AM and thinks it may be broken and is having a lot of bleeding. Pt states he is not on blood thinners

## 2019-12-18 NOTE — PLAN OF CARE
Arrived to PACU s/p left foot great toe repair with K-wire.  Has dressing with xeroform, softband ace and walking shoe.  Pulses by doppler, foot cool to touch.  Elevated on pillow and ice applied.  Denies pain.

## 2019-12-18 NOTE — DISCHARGE SUMMARY
THE PATIENT WAS ADMITTED ON 12/18/2019 HE HAD AN OPEN AVULSION FRACTURE OF THE DISTAL PHALANX OF THE LEFT GREAT TOE HE WAS BROUGHT TO SURGERY WHERE UNDERWENT A DEBRIDEMENT OF THE WOUND UNDER GENERAL ANESTHESIA HE THEN UNDERWENT A PINNING OF THE IP JOINT IN EXTENSION AND REPAIR OF THE EXTENSOR TENDON TO THE GREAT TOE PATIENT POSTOPERATIVELY WAS DISCHARGED ON PERCOCET 10 MG Q.4 HOURS AS NEEDED FOR PAIN ALONG WITH KEFLEX 500 MG 4 TIMES A DAY FOR THE NEXT 2 DAYS PATIENT SHOULD KEEP THE LEG ELEVATED KEEP THE DRESSING ON AND FOLLOW UP IN OUR OFFICE AND 6 DAYS.

## 2019-12-18 NOTE — PLAN OF CARE
12/18/19 1526   Final Note   Assessment Type Final Discharge Note   Anticipated Discharge Disposition Home     Pt discharged home this date and discharge orders reviewed.  No SS / CM needs noted at this time.

## 2019-12-18 NOTE — TRANSFER OF CARE
"Anesthesia Transfer of Care Note    Patient: Kartik Hermosillo    Procedure(s) Performed: Procedure(s) (LRB):  REPAIR, TENDON, EXTENSOR (Left)  INCISION AND DRAINAGE (Left)    Patient location: PACU    Anesthesia Type: general    Transport from OR: Transported from OR on room air with adequate spontaneous ventilation    Post pain: adequate analgesia    Post assessment: no apparent anesthetic complications    Post vital signs: stable    Level of consciousness: awake and alert    Nausea/Vomiting: no nausea/vomiting    Complications: none    Transfer of care protocol was followed      Last vitals:   Visit Vitals  BP (!) 145/85   Pulse 63   Temp 36.7 °C (98 °F)   Resp 19   Ht 6' 5" (1.956 m)   Wt 106.6 kg (235 lb)   SpO2 97%   BMI 27.87 kg/m²     "

## 2019-12-18 NOTE — ANESTHESIA PROCEDURE NOTES
Intubation  Performed by: Raphael Jalloh CRNA  Authorized by: Júnior Harris MD     Intubation:     Induction:  Intravenous    Intubated:  Postinduction    Mask Ventilation:  Easy mask    Attempts:  1    Attempted By:  CRNA    Method of Intubation:  Direct    Blade:  Fernández 2    Laryngeal View Grade: Grade I - full view of chords      Difficult Airway Encountered?: No      Complications:  None    Airway Device:  Oral endotracheal tube    Airway Device Size:  7.5    Style/Cuff Inflation:  Cuffed (inflated to minimal occlusive pressure)    Tube secured:  23    Secured at:  The lips    Placement Verified By:  Capnometry    Complicating Factors:  None    Findings Post-Intubation:  BS equal bilateral and atraumatic/condition of teeth unchanged

## 2019-12-18 NOTE — OP NOTE
[facility]  Orthopedic Surgery   Operative Note    SUMMARY     Date of Procedure: 12/18/2019     Procedure: Procedure(s) (LRB):  REPAIR, TENDON, EXTENSOR (Left)  INCISION AND DRAINAGE (Left)   REPAIR OF EXTENSOR TENDON DISTAL PHALANX LEFT GREAT TOE AND PINNING OF IP JOINT IN EXTENSION WITH K-WIRE    Surgeon(s) and Role:     * Nick Martinez MD - Primary    Assistant:  NONE    Pre-Operative Diagnosis: Diagnosis unknown [R69] OPEN AVULSION FRACTURE OF THE DISTAL PHALANX OF THE DIP JOINT LEFT GREAT TOE    Post-Operative Diagnosis: Post-Op Diagnosis Codes:     * Diagnosis unknown [R69] SAME    Anesthesia: General/MAC      Estimated Blood Loss (EBL): * No values recorded between 12/18/2019  1:09 PM and 12/18/2019  1:37 PM *           Implants: * No implants in log *    Specimens:   Specimen (12h ago, onward)    None           Complications:  NONE           Description of the Procedure:  THE PATIENT WAS GIVEN A GENERAL ANESTHETIC AND PLACED IN THE [place]PINE POSITION THE ROOT DRESSING WAS REMOVED FROM THE LEG THE PATIENT WAS GIVEN IV ANCEF PRIOR TO INCISION THE LEFT LOWER LEG WAS THEN PREPPED HIBICLENS AND CHLORAPREP AND DRAPED IN A STERILE MANNER INSPECTION OF THE WOUND SHOWED A LARGE TRANSVERSE WOUND INVOLVING THE IP JOINT OF GREAT TOE THERE WAS SOME AVULSED BONY FRAGMENTS ON THE PERIOSTEUM WHICH WERE DEBRIDED THE EXTENSOR TENDON WAS COMPLETELY TORN OFF OF THE INSERTION ON THE DISTAL PHALANX WITH A SLIVER OF BONE THE IP JOINT WAS DEBRIDED AND THEN IT WAS IRRIGATED WITH BETADINE AND NORMAL SALINE SOLUTION AFTER THOROUGH DEBRIDEMENT AND IRRIGATION A 0.45 K-WIRE WAS THEN SELECTED AND PLACED RETROGRADE INTO THE DISTAL PHALANX THE TOE WAS BROUGHT INTO EXTENSION AND THE PIN PLACED INTO THE PROXIMAL PHALANX WITH THE TOE IN COMPLETE EXTENSION IT WAS VISUALIZED UNDER FLUOROSCOPY AND SHOWN TO BE GOOD POSITION THE EXTENSOR TENDON WAS PUSHED ON THE DORSAL SURFACE OF THE DISTAL PHALANX WITH THE AVULSION RIM OF CANE  PLIF CAME FROM OF REDUCING THE TENDON INTO THE DISTAL PHALANX THE ANOTHER 0.045 K-WIRE WAS THEN PLACED OBLIQUELY ACROSS THE IP JOINT HOLDING IT OUT EXTENSION THE WOUND WAS THEN CLOSED INTERRUPTED FOR NYLON STITCH A SOFT DRESSING WAS APPLIED THE PINS WERE CUT JUST OUTSIDE THE SKIN AND A JERGEN'S BALLS PLACED ON THE TIPS OF THE WIRES SOFT DRESSING WAS APPLIED AND THE LEG WAS PLACED IN A POSTOP SHOE

## 2019-12-18 NOTE — NURSING
Received pt from [facility].  Pt stable and alert.  Assisted pt to bedside commode.  Crutches ordered .   Ice pack applied to foot.

## 2019-12-18 NOTE — PLAN OF CARE
Transported to Room 1213 in stable condition via bed.  Denies pain.  Left foot elevated on pillow.  Pulses by doppler and 2+ edema noted.  Dressing D&I.

## 2019-12-19 LAB
ESTIMATED AVG GLUCOSE: 123 MG/DL (ref 68–131)
HBA1C MFR BLD HPLC: 5.9 % (ref 4.5–6.2)

## 2019-12-24 ENCOUNTER — OFFICE VISIT (OUTPATIENT)
Dept: ORTHOPEDICS | Facility: CLINIC | Age: 79
End: 2019-12-24
Payer: MEDICARE

## 2019-12-24 VITALS — BODY MASS INDEX: 27.75 KG/M2 | WEIGHT: 235 LBS | HEIGHT: 77 IN

## 2019-12-24 DIAGNOSIS — S92.421D CLOSED DISPLACED FRACTURE OF DISTAL PHALANX OF RIGHT GREAT TOE WITH ROUTINE HEALING, SUBSEQUENT ENCOUNTER: Primary | ICD-10-CM

## 2019-12-24 PROCEDURE — 99024 PR POST-OP FOLLOW-UP VISIT: ICD-10-PCS | Mod: S$GLB,,, | Performed by: ORTHOPAEDIC SURGERY

## 2019-12-24 PROCEDURE — 99999 PR PBB SHADOW E&M-EST. PATIENT-LVL II: ICD-10-PCS | Mod: PBBFAC,,, | Performed by: ORTHOPAEDIC SURGERY

## 2019-12-24 PROCEDURE — 99999 PR PBB SHADOW E&M-EST. PATIENT-LVL II: CPT | Mod: PBBFAC,,, | Performed by: ORTHOPAEDIC SURGERY

## 2019-12-24 PROCEDURE — 99024 POSTOP FOLLOW-UP VISIT: CPT | Mod: S$GLB,,, | Performed by: ORTHOPAEDIC SURGERY

## 2019-12-24 RX ORDER — OXYCODONE AND ACETAMINOPHEN 10; 325 MG/1; MG/1
TABLET ORAL
COMMUNITY
Start: 2019-12-19 | End: 2020-02-21

## 2019-12-24 RX ORDER — TRIAMCINOLONE ACETONIDE 1 MG/G
CREAM TOPICAL
COMMUNITY
Start: 2018-02-15

## 2019-12-24 RX ORDER — CEPHALEXIN 500 MG/1
CAPSULE ORAL
COMMUNITY
Start: 2019-12-19 | End: 2020-02-21

## 2019-12-24 NOTE — PROGRESS NOTES
12/24/2019    History reviewed. No pertinent past medical history.    Past Surgical History:   Procedure Laterality Date    APPENDECTOMY      INCISION AND DRAINAGE Left 12/18/2019    Procedure: INCISION AND DRAINAGE;  Surgeon: Nick Martinez MD;  Location: [facility] OR;  Service: Orthopedics;  Laterality: Left;    REPAIR OF EXTENSOR TENDON Left 12/18/2019    Procedure: REPAIR, TENDON, EXTENSOR;  Surgeon: Nick Martinez MD;  Location: [facility] OR;  Service: Orthopedics;  Laterality: Left;  PRACHI ANGEL       Current Outpatient Medications   Medication Sig    aspirin (ECOTRIN) 81 MG EC tablet Take 1 tablet by mouth once daily.    CANNABIDIOL, CBD, EXTRACT ORAL Place 0.5 mLs under the tongue every 8 (eight) hours as needed. 5 mg/ml formula    cephALEXin (KEFLEX) 500 MG capsule TAKE 1 CAPSULE BY MOUTH 4 TIMES DAILY    lidocaine HCl/epinephrine (BUFFERED LIDOCAINE, LIDOCAINE-EPINEPHRINE, SODIUM BICARB,) PLACE 0.5ML UNDER TONGUE EVERY 8 HOURS AS NEEDED FOR PAIN.    oxyCODONE-acetaminophen (PERCOCET)  mg per tablet TAKE 1 TABLET BY MOUTH EVERY 4 TO 6 HOURS AS NEEDED (MAX 3 TABLETS PER DAY)    triamcinolone acetonide 0.1% (KENALOG) 0.1 % cream 1 application to affected area     No current facility-administered medications for this visit.        Review of patient's allergies indicates:  No Known Allergies    Family History   Problem Relation Age of Onset    Diabetes Father        Social History     Socioeconomic History    Marital status: Single     Spouse name: Not on file    Number of children: Not on file    Years of education: Not on file    Highest education level: Not on file   Occupational History    Not on file   Social Needs    Financial resource strain: Not on file    Food insecurity:     Worry: Not on file     Inability: Not on file    Transportation needs:     Medical: Not on file     Non-medical: Not on file   Tobacco Use    Smoking status: Never Smoker    Smokeless tobacco:  "Never Used   Substance and Sexual Activity    Alcohol use: Not Currently    Drug use: Never    Sexual activity: Not on file   Lifestyle    Physical activity:     Days per week: Not on file     Minutes per session: Not on file    Stress: Not on file   Relationships    Social connections:     Talks on phone: Not on file     Gets together: Not on file     Attends Confucianism service: Not on file     Active member of club or organization: Not on file     Attends meetings of clubs or organizations: Not on file     Relationship status: Not on file   Other Topics Concern    Not on file   Social History Narrative    Not on file       Chief Complaint:   Chief Complaint   Patient presents with    Left Foot - Pain, Post-op Evaluation     DOS: 12/18/2019 -- 6 days S/P OPEN AVULSION FRACTURE OF THE DISTAL PHALANX OF THE DIP JOINT LEFT GREAT TOE. Patient ambulates with crutches and bandages intact.          History of present illness:    This is a 79 y.o. year old male who complains of patient is 6 days postop pinning of a left distal phalanx fracture with an open joint and extensor tendon repair he is doing well with no complaints    Review of Systems:    Constitution: Denies chills, fever, and sweats.  HENT: Denies headaches or blurry vision.  Cardiovascular: Denies chest pain or irregular heart beat.  Respiratory: Denies cough or shortness of breath.  Gastrointestinal: Denies abdominal pain, nausea, or vomiting.  Musculoskeletal:  Denies muscle cramps.  Neurological: Denies dizziness or focal weakness.  Psychiatric/Behavioral: Normal mental status.  Hematologic/Lymphatic: Denies bleeding problem or easy bruising/bleeding.  Skin: Denies rash or suspicious lesions.    Examination:    Vital Signs:    Vitals:    12/24/19 1012   Weight: 106.6 kg (235 lb)   Height: 6' 5" (1.956 m)   PainSc: 0-No pain   PainLoc: Toe       Body mass index is 27.87 kg/m².    This a well-developed, well nourished patient in no acute " distress.    Alert and oriented x 3 and cooperative to examination.       Physical Exam:  Left foot-the dressing is intact he has good capillary filling of the [place] balls her on the tip of the pins doing well    Imaging:  No x-rays       Assessment: Closed displaced fracture of distal phalanx of right great toe with routine healing, subsequent encounter        Plan:  Patient has been told to keep the wound dry dry and do heel walking only in follow-up in 1 week for suture      DISCLAIMER: This note may have been dictated using voice recognition software and may contain grammatical errors.     NOTE: Consult report sent to referring provider via [employer] EMR.

## 2019-12-27 ENCOUNTER — TELEPHONE (OUTPATIENT)
Dept: FAMILY MEDICINE | Facility: CLINIC | Age: 79
End: 2019-12-27

## 2019-12-27 NOTE — TELEPHONE ENCOUNTER
----- Message from Debra Zuluaga sent at 12/27/2019 11:08 AM CST -----  Pt was discharged from [facility] on 12/18. [facility] left him a message to contact our office. Please advise. Pt #[phone]

## 2019-12-30 DIAGNOSIS — S92.422D CLOSED DISPLACED FRACTURE OF DISTAL PHALANX OF LEFT GREAT TOE WITH ROUTINE HEALING, SUBSEQUENT ENCOUNTER: Primary | ICD-10-CM

## 2020-01-01 ENCOUNTER — TELEPHONE (OUTPATIENT)
Dept: FAMILY MEDICINE | Facility: CLINIC | Age: 80
End: 2020-01-01

## 2020-01-03 ENCOUNTER — HOSPITAL ENCOUNTER (OUTPATIENT)
Dept: RADIOLOGY | Facility: HOSPITAL | Age: 80
Discharge: HOME | End: 2020-01-03
Attending: ORTHOPAEDIC SURGERY
Payer: MEDICARE

## 2020-01-03 ENCOUNTER — OFFICE VISIT (OUTPATIENT)
Dept: ORTHOPEDICS | Facility: CLINIC | Age: 80
End: 2020-01-03
Payer: MEDICARE

## 2020-01-03 DIAGNOSIS — S92.422D CLOSED DISPLACED FRACTURE OF DISTAL PHALANX OF LEFT GREAT TOE WITH ROUTINE HEALING, SUBSEQUENT ENCOUNTER: ICD-10-CM

## 2020-01-03 DIAGNOSIS — S92.422D CLOSED DISPLACED FRACTURE OF DISTAL PHALANX OF LEFT GREAT TOE WITH ROUTINE HEALING, SUBSEQUENT ENCOUNTER: Primary | ICD-10-CM

## 2020-01-03 PROCEDURE — 99024 POSTOP FOLLOW-UP VISIT: CPT | Mod: S$GLB,,, | Performed by: ORTHOPAEDIC SURGERY

## 2020-01-03 PROCEDURE — 99999 PR PBB SHADOW E&M-EST. PATIENT-LVL II: CPT | Mod: PBBFAC,,, | Performed by: ORTHOPAEDIC SURGERY

## 2020-01-03 PROCEDURE — 99999 PR PBB SHADOW E&M-EST. PATIENT-LVL II: ICD-10-PCS | Mod: PBBFAC,,, | Performed by: ORTHOPAEDIC SURGERY

## 2020-01-03 PROCEDURE — 73630 X-RAY EXAM OF FOOT: CPT | Mod: 26,LT,, | Performed by: RADIOLOGY

## 2020-01-03 PROCEDURE — 73630 XR FOOT COMPLETE 3 VIEW LEFT: ICD-10-PCS | Mod: 26,LT,, | Performed by: RADIOLOGY

## 2020-01-03 PROCEDURE — 99024 PR POST-OP FOLLOW-UP VISIT: ICD-10-PCS | Mod: S$GLB,,, | Performed by: ORTHOPAEDIC SURGERY

## 2020-01-03 PROCEDURE — 73630 X-RAY EXAM OF FOOT: CPT | Mod: TC,PN,LT

## 2020-01-03 NOTE — PROGRESS NOTES
1/3/2020    History reviewed. No pertinent past medical history.    Past Surgical History:   Procedure Laterality Date    APPENDECTOMY      INCISION AND DRAINAGE Left 12/18/2019    Procedure: INCISION AND DRAINAGE;  Surgeon: Nick Martinez MD;  Location: [facility] OR;  Service: Orthopedics;  Laterality: Left;    REPAIR OF EXTENSOR TENDON Left 12/18/2019    Procedure: REPAIR, TENDON, EXTENSOR;  Surgeon: Nick Martinez MD;  Location: [facility] OR;  Service: Orthopedics;  Laterality: Left;  PRACHI ANGEL       Current Outpatient Medications   Medication Sig    aspirin (ECOTRIN) 81 MG EC tablet Take 1 tablet by mouth once daily.    CANNABIDIOL, CBD, EXTRACT ORAL Place 0.5 mLs under the tongue every 8 (eight) hours as needed. 5 mg/ml formula    cephALEXin (KEFLEX) 500 MG capsule TAKE 1 CAPSULE BY MOUTH 4 TIMES DAILY    lidocaine HCl/epinephrine (BUFFERED LIDOCAINE, LIDOCAINE-EPINEPHRINE, SODIUM BICARB,) PLACE 0.5ML UNDER TONGUE EVERY 8 HOURS AS NEEDED FOR PAIN.    oxyCODONE-acetaminophen (PERCOCET)  mg per tablet TAKE 1 TABLET BY MOUTH EVERY 4 TO 6 HOURS AS NEEDED (MAX 3 TABLETS PER DAY)    triamcinolone acetonide 0.1% (KENALOG) 0.1 % cream 1 application to affected area     No current facility-administered medications for this visit.        Review of patient's allergies indicates:  No Known Allergies    Family History   Problem Relation Age of Onset    Diabetes Father        Social History     Socioeconomic History    Marital status: Single     Spouse name: Not on file    Number of children: Not on file    Years of education: Not on file    Highest education level: Not on file   Occupational History    Not on file   Social Needs    Financial resource strain: Not on file    Food insecurity:     Worry: Not on file     Inability: Not on file    Transportation needs:     Medical: Not on file     Non-medical: Not on file   Tobacco Use    Smoking status: Never Smoker    Smokeless tobacco:  Never Used   Substance and Sexual Activity    Alcohol use: Not Currently    Drug use: Never    Sexual activity: Not on file   Lifestyle    Physical activity:     Days per week: Not on file     Minutes per session: Not on file    Stress: Not on file   Relationships    Social connections:     Talks on phone: Not on file     Gets together: Not on file     Attends Anglican service: Not on file     Active member of club or organization: Not on file     Attends meetings of clubs or organizations: Not on file     Relationship status: Not on file   Other Topics Concern    Not on file   Social History Narrative    Not on file       Chief Complaint:   Chief Complaint   Patient presents with    Left Foot - Pain, Post-op Evaluation     DOS: 12/18/2019 -- 16 days S/P OPEN AVULSION FRACTURE OF THE DISTAL PHALANX OF THE DIP JOINT LEFT GREAT TOE.         History of present illness:    This is a 79 y.o. year old male who complains of patient is now 16 days status post pinning of a avulsion fracture of the distal phalanx of the great toe    Review of Systems:    Constitution: Denies chills, fever, and sweats.  HENT: Denies headaches or blurry vision.  Cardiovascular: Denies chest pain or irregular heart beat.  Respiratory: Denies cough or shortness of breath.  Gastrointestinal: Denies abdominal pain, nausea, or vomiting.  Musculoskeletal:  Denies muscle cramps.  Neurological: Denies dizziness or focal weakness.  Psychiatric/Behavioral: Normal mental status.  Hematologic/Lymphatic: Denies bleeding problem or easy bruising/bleeding.  Skin: Denies rash or suspicious lesions.    Examination:    Vital Signs:  There were no vitals filed for this visit.    There is no height or weight on file to calculate BMI.    This a well-developed, well nourished patient in no acute distress.    Alert and oriented x 3 and cooperative to examination.       Physical Exam:  Left ft-his incision is healing well is sutures removed the toe was in  extension is no drainage from the pin sites    Imaging:  X-rays of the left foot show the toe in full extension the pins in good position       Assessment: Closed displaced fracture of distal phalanx of left great toe with routine healing, subsequent encounter        Plan:  We will remove his sutures today patient is in the need keep these pins in his toe for about another 5 and half weeks he will return then will remove the pins in the office he should keep this area clean and dry      DISCLAIMER: This note may have been dictated using voice recognition software and may contain grammatical errors.     NOTE: Consult report sent to referring provider via [employer] EMR.

## 2020-01-15 ENCOUNTER — OFFICE VISIT (OUTPATIENT)
Dept: ORTHOPEDICS | Facility: CLINIC | Age: 80
End: 2020-01-15
Payer: MEDICARE

## 2020-01-15 ENCOUNTER — APPOINTMENT (OUTPATIENT)
Dept: LAB | Facility: HOSPITAL | Age: 80
End: 2020-01-15
Attending: ORTHOPAEDIC SURGERY
Payer: MEDICARE

## 2020-01-15 ENCOUNTER — TELEPHONE (OUTPATIENT)
Dept: ORTHOPEDICS | Facility: CLINIC | Age: 80
End: 2020-01-15

## 2020-01-15 ENCOUNTER — HOSPITAL ENCOUNTER (OUTPATIENT)
Dept: RADIOLOGY | Facility: HOSPITAL | Age: 80
Discharge: HOME | End: 2020-01-15
Attending: ORTHOPAEDIC SURGERY
Payer: MEDICARE

## 2020-01-15 DIAGNOSIS — S92.422D CLOSED DISPLACED FRACTURE OF DISTAL PHALANX OF LEFT GREAT TOE WITH ROUTINE HEALING, SUBSEQUENT ENCOUNTER: Primary | ICD-10-CM

## 2020-01-15 DIAGNOSIS — S92.422D CLOSED DISPLACED FRACTURE OF DISTAL PHALANX OF LEFT GREAT TOE WITH ROUTINE HEALING, SUBSEQUENT ENCOUNTER: ICD-10-CM

## 2020-01-15 DIAGNOSIS — S92.422D: Primary | ICD-10-CM

## 2020-01-15 PROCEDURE — 87077 CULTURE AEROBIC IDENTIFY: CPT

## 2020-01-15 PROCEDURE — 99024 PR POST-OP FOLLOW-UP VISIT: ICD-10-PCS | Mod: S$GLB,,, | Performed by: ORTHOPAEDIC SURGERY

## 2020-01-15 PROCEDURE — 87186 SC STD MICRODIL/AGAR DIL: CPT

## 2020-01-15 PROCEDURE — 87205 SMEAR GRAM STAIN: CPT

## 2020-01-15 PROCEDURE — 73630 XR FOOT COMPLETE 3 VIEW LEFT: ICD-10-PCS | Mod: 26,LT,, | Performed by: RADIOLOGY

## 2020-01-15 PROCEDURE — 99999 PR PBB SHADOW E&M-EST. PATIENT-LVL II: ICD-10-PCS | Mod: PBBFAC,,, | Performed by: ORTHOPAEDIC SURGERY

## 2020-01-15 PROCEDURE — 87075 CULTR BACTERIA EXCEPT BLOOD: CPT

## 2020-01-15 PROCEDURE — 87076 CULTURE ANAEROBE IDENT EACH: CPT

## 2020-01-15 PROCEDURE — 99024 POSTOP FOLLOW-UP VISIT: CPT | Mod: S$GLB,,, | Performed by: ORTHOPAEDIC SURGERY

## 2020-01-15 PROCEDURE — 73630 X-RAY EXAM OF FOOT: CPT | Mod: TC,PN,LT

## 2020-01-15 PROCEDURE — 73630 X-RAY EXAM OF FOOT: CPT | Mod: 26,LT,, | Performed by: RADIOLOGY

## 2020-01-15 PROCEDURE — 99999 PR PBB SHADOW E&M-EST. PATIENT-LVL II: CPT | Mod: PBBFAC,,, | Performed by: ORTHOPAEDIC SURGERY

## 2020-01-15 PROCEDURE — 87070 CULTURE OTHR SPECIMN AEROBIC: CPT

## 2020-01-15 RX ORDER — CIPROFLOXACIN 500 MG/1
500 TABLET ORAL EVERY 12 HOURS
Qty: 20 TABLET | Refills: 0 | Status: SHIPPED | OUTPATIENT
Start: 2020-01-15 | End: 2020-01-25

## 2020-01-15 NOTE — PROGRESS NOTES
1/15/2020    History reviewed. No pertinent past medical history.    Past Surgical History:   Procedure Laterality Date    APPENDECTOMY      INCISION AND DRAINAGE Left 12/18/2019    Procedure: INCISION AND DRAINAGE;  Surgeon: Nick Martinez MD;  Location: [facility] OR;  Service: Orthopedics;  Laterality: Left;    REPAIR OF EXTENSOR TENDON Left 12/18/2019    Procedure: REPAIR, TENDON, EXTENSOR;  Surgeon: Nick Martinez MD;  Location: [facility] OR;  Service: Orthopedics;  Laterality: Left;  PRACHI ANGEL       Current Outpatient Medications   Medication Sig    aspirin (ECOTRIN) 81 MG EC tablet Take 1 tablet by mouth once daily.    CANNABIDIOL, CBD, EXTRACT ORAL Place 0.5 mLs under the tongue every 8 (eight) hours as needed. 5 mg/ml formula    cephALEXin (KEFLEX) 500 MG capsule TAKE 1 CAPSULE BY MOUTH 4 TIMES DAILY    lidocaine HCl/epinephrine (BUFFERED LIDOCAINE, LIDOCAINE-EPINEPHRINE, SODIUM BICARB,) PLACE 0.5ML UNDER TONGUE EVERY 8 HOURS AS NEEDED FOR PAIN.    oxyCODONE-acetaminophen (PERCOCET)  mg per tablet TAKE 1 TABLET BY MOUTH EVERY 4 TO 6 HOURS AS NEEDED (MAX 3 TABLETS PER DAY)    triamcinolone acetonide 0.1% (KENALOG) 0.1 % cream 1 application to affected area     No current facility-administered medications for this visit.        Review of patient's allergies indicates:  No Known Allergies    Family History   Problem Relation Age of Onset    Diabetes Father        Social History     Socioeconomic History    Marital status: Single     Spouse name: Not on file    Number of children: Not on file    Years of education: Not on file    Highest education level: Not on file   Occupational History    Not on file   Social Needs    Financial resource strain: Not on file    Food insecurity:     Worry: Not on file     Inability: Not on file    Transportation needs:     Medical: Not on file     Non-medical: Not on file   Tobacco Use    Smoking status: Never Smoker    Smokeless tobacco:  Never Used   Substance and Sexual Activity    Alcohol use: Not Currently    Drug use: Never    Sexual activity: Not on file   Lifestyle    Physical activity:     Days per week: Not on file     Minutes per session: Not on file    Stress: Not on file   Relationships    Social connections:     Talks on phone: Not on file     Gets together: Not on file     Attends Religion service: Not on file     Active member of club or organization: Not on file     Attends meetings of clubs or organizations: Not on file     Relationship status: Not on file   Other Topics Concern    Not on file   Social History Narrative    Not on file       Chief Complaint:   Chief Complaint   Patient presents with    Left Foot - Pain, Post-op Evaluation     DOS: 12/18/2019 -- 4 weeksS/P OPEN AVULSION FRACTURE OF THE DISTAL PHALANX OF THE DIP JOINT LEFT GREAT TOE.         History of present illness:    This is a 79 y.o. year old male who complains of patient is now about 4 weeks status post pinning of a extensor tendon avulsion from the distal phalanx of the toe with an open laceration of the joint the patient's joint was washed out and the 2 pins were placed across the joint    Review of Systems:    Constitution: Denies chills, fever, and sweats.  HENT: Denies headaches or blurry vision.  Cardiovascular: Denies chest pain or irregular heart beat.  Respiratory: Denies cough or shortness of breath.  Gastrointestinal: Denies abdominal pain, nausea, or vomiting.  Musculoskeletal:  Denies muscle cramps.  Neurological: Denies dizziness or focal weakness.  Psychiatric/Behavioral: Normal mental status.  Hematologic/Lymphatic: Denies bleeding problem or easy bruising/bleeding.  Skin: Denies rash or suspicious lesions.    Examination:    Vital Signs:  There were no vitals filed for this visit.    There is no height or weight on file to calculate BMI.    This a well-developed, well nourished patient in no acute distress.    Alert and oriented x 3 and  cooperative to examination.       Physical Exam:  Right great toe-patient has some evidence of low purulent material near the wound dehiscence over the dorsum of the toe this area was debrided and cultured the pin tracts appear to be okay with no evidence of any pus there is some surrounding cellulitis around the edges of the wound    Imaging:  No x-rays today       Assessment: Closed displaced fracture of distal phalanx of left great toe with routine healing, subsequent encounter        Plan:  I debrided the area in the office and also cultured this area will start him on some Cipro in see him back in 2 days if he shows no improvement we may have to admit him and put him on some IV antibiotics      DISCLAIMER: This note may have been dictated using voice recognition software and may contain grammatical errors.     NOTE: Consult report sent to referring provider via [employer] EMR.

## 2020-01-15 NOTE — TELEPHONE ENCOUNTER
Sheri states that the orders are linked to [facility] and they are not linked properly. I explained that this is how the orders are always placed and I was not sure how to correct the situation. Everyone has left for the day and will take care of this first thing in the morning.

## 2020-01-15 NOTE — TELEPHONE ENCOUNTER
----- Message from Sandhya Quevedo sent at 1/15/2020  4:56 PM CST -----  Contact: Sheri([facility])  Type:  Needs Medical Advice    Who Called: Sheri  Would the patient rather a call back or a response via [facility]? Call back  Best Call Back Number: [phone]  Additional Information: would like to speak to nurse in office

## 2020-01-16 ENCOUNTER — TELEPHONE (OUTPATIENT)
Dept: ORTHOPEDICS | Facility: CLINIC | Age: 80
End: 2020-01-16

## 2020-01-16 NOTE — TELEPHONE ENCOUNTER
Concerned about side affect of Cipro. I explained it is safe for him to start medication. He lopez DC use if he notices any thing out of the ordinary happening and give us a call back.

## 2020-01-16 NOTE — TELEPHONE ENCOUNTER
----- Message from Katharine Perez sent at 1/16/2020 11:29 AM CST -----  Contact: pt [phone]  Patient called and asked for a call back he has some questions about some of his medications, pt is asking for a call back soon as you can.

## 2020-01-16 NOTE — TELEPHONE ENCOUNTER
----- Message from Mj Morales sent at 1/16/2020  1:55 PM CST -----  Contact: pt  Returning call, try again

## 2020-01-19 LAB
BACTERIA FLD AEROBE CULT: ABNORMAL
GRAM STN SPEC: ABNORMAL

## 2020-01-21 ENCOUNTER — OFFICE VISIT (OUTPATIENT)
Dept: FAMILY MEDICINE | Facility: CLINIC | Age: 80
End: 2020-01-21
Payer: MEDICARE

## 2020-01-21 ENCOUNTER — HOSPITAL ENCOUNTER (OUTPATIENT)
Dept: RADIOLOGY | Facility: HOSPITAL | Age: 80
Discharge: HOME | End: 2020-01-21
Attending: NURSE PRACTITIONER
Payer: MEDICARE

## 2020-01-21 VITALS
DIASTOLIC BLOOD PRESSURE: 80 MMHG | WEIGHT: 236 LBS | HEART RATE: 68 BPM | SYSTOLIC BLOOD PRESSURE: 128 MMHG | BODY MASS INDEX: 28.74 KG/M2 | HEIGHT: 76 IN

## 2020-01-21 DIAGNOSIS — S91.109S: Primary | ICD-10-CM

## 2020-01-21 DIAGNOSIS — S96.909S: ICD-10-CM

## 2020-01-21 DIAGNOSIS — S91.109S: ICD-10-CM

## 2020-01-21 DIAGNOSIS — S96.909S: Primary | ICD-10-CM

## 2020-01-21 LAB — BACTERIA SPEC ANAEROBE CULT: NORMAL

## 2020-01-21 PROCEDURE — 1159F MED LIST DOCD IN RCRD: CPT | Mod: S$GLB,,, | Performed by: NURSE PRACTITIONER

## 2020-01-21 PROCEDURE — 1159F PR MEDICATION LIST DOCUMENTED IN MEDICAL RECORD: ICD-10-PCS | Mod: S$GLB,,, | Performed by: NURSE PRACTITIONER

## 2020-01-21 PROCEDURE — 99214 PR OFFICE/OUTPT VISIT, EST, LEVL IV, 30-39 MIN: ICD-10-PCS | Mod: S$GLB,,, | Performed by: NURSE PRACTITIONER

## 2020-01-21 PROCEDURE — 1101F PT FALLS ASSESS-DOCD LE1/YR: CPT | Mod: S$GLB,,, | Performed by: NURSE PRACTITIONER

## 2020-01-21 PROCEDURE — 99214 OFFICE O/P EST MOD 30 MIN: CPT | Mod: S$GLB,,, | Performed by: NURSE PRACTITIONER

## 2020-01-21 PROCEDURE — 73630 X-RAY EXAM OF FOOT: CPT | Mod: TC,PO,LT

## 2020-01-21 PROCEDURE — 1101F PR PT FALLS ASSESS DOC 0-1 FALLS W/OUT INJ PAST YR: ICD-10-PCS | Mod: S$GLB,,, | Performed by: NURSE PRACTITIONER

## 2020-01-21 RX ORDER — CLINDAMYCIN HYDROCHLORIDE 300 MG/1
300 CAPSULE ORAL 3 TIMES DAILY
Qty: 30 CAPSULE | Refills: 0 | Status: SHIPPED | OUTPATIENT
Start: 2020-01-21 | End: 2020-01-31

## 2020-01-21 NOTE — PROGRESS NOTES
SUBJECTIVE:    Patient ID: Kartik Hermosillo is a 79 y.o. male.    Chief Complaint: Follow-up (broke left big toe since December, no bottles// SW)    Pt presents for follow-up after surgical repair of L great toe fracture. He is being followed by Dr. Martinez. Saw Dr. Martinez on 1/15/20 for some wound dehiscence and cellulitis around the area. Wound was debrided, cultures taken, and patient was placed on Cipro. He presents in office today concerned that his toe is not getting better. He reports toe appears more red and redness is spreading towards smaller toes. Feels that foot is also more swollen. Reports he is not sure what to do. Denies any fevers or drainage from site. Has been cleaning the site multiple times per day with peroxide. States he was told not to get it wet. Xray of foot on 1/15 showed no signs of osteomyelitis. Cultures grew staph aureus.       Appointment on 01/15/2020   Component Date Value Ref Range Status    AEROBIC CULTURE - FLUID 01/15/2020 *  Final                    Value:STAPHYLOCOCCUS AUREUS  Many  No other significant isolate      Gram Stain Result 01/15/2020 Rare WBC's   Final    Gram Stain Result 01/15/2020 Rare Gram positive cocci   Final    Gram Stain Result 01/15/2020 Rare Gram positive rods   Final    Anaerobic Culture 01/15/2020 No anaerobes isolated   Final   Admission on 12/18/2019, Discharged on 12/18/2019   Component Date Value Ref Range Status    WBC 12/18/2019 4.52  3.90 - 12.70 K/uL Final    RBC 12/18/2019 5.01  4.60 - 6.20 M/uL Final    Hemoglobin 12/18/2019 15.3  14.0 - 18.0 g/dL Final    Hematocrit 12/18/2019 45.6  40.0 - 54.0 % Final    Mean Corpuscular Volume 12/18/2019 91  82 - 98 fL Final    Mean Corpuscular Hemoglobin 12/18/2019 30.5  27.0 - 31.0 pg Final    Mean Corpuscular Hemoglobin Conc 12/18/2019 33.6  32.0 - 36.0 g/dL Final    RDW 12/18/2019 13.0  11.5 - 14.5 % Final    Platelets 12/18/2019 133* 150 - 350 K/uL Final    MPV 12/18/2019 11.0  9.2 - 12.9  fL Final    Immature Granulocytes 12/18/2019 0.2  0.0 - 0.5 % Final    Gran # (ANC) 12/18/2019 2.7  1.8 - 7.7 K/uL Final    Immature Grans (Abs) 12/18/2019 0.01  0.00 - 0.04 K/uL Final    Lymph # 12/18/2019 1.1  1.0 - 4.8 K/uL Final    Mono # 12/18/2019 0.5  0.3 - 1.0 K/uL Final    Eos # 12/18/2019 0.2  0.0 - 0.5 K/uL Final    Baso # 12/18/2019 0.05  0.00 - 0.20 K/uL Final    nRBC 12/18/2019 0  0 /100 WBC Final    Gran% 12/18/2019 60.4  38.0 - 73.0 % Final    Lymph% 12/18/2019 24.1  18.0 - 48.0 % Final    Mono% 12/18/2019 10.2  4.0 - 15.0 % Final    Eosinophil% 12/18/2019 4.0  0.0 - 8.0 % Final    Basophil% 12/18/2019 1.1  0.0 - 1.9 % Final    Differential Method 12/18/2019 Automated   Final    Sodium 12/18/2019 138  136 - 145 mmol/L Final    Potassium 12/18/2019 4.0  3.5 - 5.1 mmol/L Final    Chloride 12/18/2019 103  95 - 110 mmol/L Final    CO2 12/18/2019 28  23 - 29 mmol/L Final    Glucose 12/18/2019 143* 70 - 110 mg/dL Final    BUN, Bld 12/18/2019 18  8 - 23 mg/dL Final    Creatinine 12/18/2019 0.9  0.5 - 1.4 mg/dL Final    Calcium 12/18/2019 9.0  8.7 - 10.5 mg/dL Final    Total Protein 12/18/2019 7.0  6.0 - 8.4 g/dL Final    Albumin 12/18/2019 4.3  3.5 - 5.2 g/dL Final    Total Bilirubin 12/18/2019 1.3* 0.1 - 1.0 mg/dL Final    Alkaline Phosphatase 12/18/2019 63  55 - 135 U/L Final    AST 12/18/2019 28  10 - 40 U/L Final    ALT 12/18/2019 24  10 - 44 U/L Final    Anion Gap 12/18/2019 7* 8 - 16 mmol/L Final    eGFR if African American 12/18/2019 >60.0  >60 mL/min/1.73 m^2 Final    eGFR if non African American 12/18/2019 >60.0  >60 mL/min/1.73 m^2 Final    Specimen UA 12/18/2019 Urine, Clean Catch   Final    Color, UA 12/18/2019 Colorless* Yellow, Straw, Marianne Final    Appearance, UA 12/18/2019 Clear  Clear Final    pH, UA 12/18/2019 7.0  5.0 - 8.0 Final    Specific Gravity, UA 12/18/2019 1.010  1.005 - 1.030 Final    Protein, UA 12/18/2019 Negative  Negative Final     Glucose, UA 12/18/2019 Negative  Negative Final    Ketones, UA 12/18/2019 Negative  Negative Final    Bilirubin (UA) 12/18/2019 Negative  Negative Final    Occult Blood UA 12/18/2019 Negative  Negative Final    Nitrite, UA 12/18/2019 Negative  Negative Final    Urobilinogen, UA 12/18/2019 Negative  Negative EU/dL Final    Leukocytes, UA 12/18/2019 Negative  Negative Final    PT 12/18/2019 13.6  10.6 - 14.8 sec Final    INR 12/18/2019 1.1   Final    aPTT 12/18/2019 32.6  23.6 - 33.3 sec Final    Hemoglobin A1C 12/18/2019 5.9  4.5 - 6.2 % Final    Estimated Avg Glucose 12/18/2019 123  68 - 131 mg/dL Final       History reviewed. No pertinent past medical history.  Past Surgical History:   Procedure Laterality Date    APPENDECTOMY      INCISION AND DRAINAGE Left 12/18/2019    Procedure: INCISION AND DRAINAGE;  Surgeon: Nick Martinez MD;  Location: [facility] OR;  Service: Orthopedics;  Laterality: Left;    REPAIR OF EXTENSOR TENDON Left 12/18/2019    Procedure: REPAIR, TENDON, EXTENSOR;  Surgeon: Nick Martinez MD;  Location: [facility] OR;  Service: Orthopedics;  Laterality: Left;  STANLEYPRACHI     Family History   Problem Relation Age of Onset    Diabetes Father        Marital Status: Single  Alcohol History:  reports that he drank alcohol.  Tobacco History:  reports that he has never smoked. He has never used smokeless tobacco.  Drug History:  reports that he does not use drugs.    Review of patient's allergies indicates:  No Known Allergies    Current Outpatient Medications:     aspirin (ECOTRIN) 81 MG EC tablet, Take 1 tablet by mouth once daily., Disp: , Rfl:     CANNABIDIOL, CBD, EXTRACT ORAL, Place 0.5 mLs under the tongue every 8 (eight) hours as needed. 5 mg/ml formula, Disp: , Rfl: 0    cephALEXin (KEFLEX) 500 MG capsule, TAKE 1 CAPSULE BY MOUTH 4 TIMES DAILY, Disp: , Rfl:     ciprofloxacin HCl (CIPRO) 500 MG tablet, Take 1 tablet (500 mg total) by mouth every 12 (twelve) hours.  "for 10 days, Disp: 20 tablet, Rfl: 0    clindamycin (CLEOCIN) 300 MG capsule, Take 1 capsule (300 mg total) by mouth 3 (three) times daily. for 10 days, Disp: 30 capsule, Rfl: 0    lidocaine HCl/epinephrine (BUFFERED LIDOCAINE, LIDOCAINE-EPINEPHRINE, SODIUM BICARB,), PLACE 0.5ML UNDER TONGUE EVERY 8 HOURS AS NEEDED FOR PAIN., Disp: , Rfl:     oxyCODONE-acetaminophen (PERCOCET)  mg per tablet, TAKE 1 TABLET BY MOUTH EVERY 4 TO 6 HOURS AS NEEDED (MAX 3 TABLETS PER DAY), Disp: , Rfl:     triamcinolone acetonide 0.1% (KENALOG) 0.1 % cream, 1 application to affected area, Disp: , Rfl:     Review of Systems   Constitutional: Negative.    HENT: Negative.    Respiratory: Negative.    Cardiovascular: Negative.    Gastrointestinal: Negative.    Genitourinary: Negative.    Musculoskeletal:        Some pain in left foot.  Currently using crutches.   Skin: Positive for wound (To left great toe with pins).   Neurological: Negative.    Psychiatric/Behavioral: Negative.           Objective:      Vitals:    01/21/20 1143   BP: 128/80   Pulse: 68   Weight: 107 kg (236 lb)   Height: 6' 4" (1.93 m)     Body mass index is 28.73 kg/m².  Physical Exam   Constitutional: He appears well-developed and well-nourished.   HENT:   Head: Normocephalic.   Cardiovascular: Normal rate, regular rhythm and normal heart sounds.   Pulmonary/Chest: Effort normal and breath sounds normal.   Musculoskeletal: Normal range of motion.   Skin:   Wound across L great toe with scabbing. 2 pins in toe. Skin is excoriated across toe. Toe is erythematous with redness extending across top of foot and across smaller toes. Patient reports minimal pain with palpation of the toe.              Assessment:       1. Wound, open, toe with tendon involvement, sequela         Plan:       Wound, open, toe with tendon involvement, sequela  -     clindamycin (CLEOCIN) 300 MG capsule; Take 1 capsule (300 mg total) by mouth 3 (three) times daily. for 10 days  Dispense: " 30 capsule; Refill: 0  -     X-Ray Foot Complete 3 view Left; Future; Expected date: 01/21/2020  - Changed antibiotic for better coverage. X-ray of foot to r/o osteomyelitis. Will inform Dr. Martinez.  - F/u here in 3 days if unable to f/u with Dr. Martinez's office sooner.     Treatment plan discussed with Dr. Vargas.    Follow up in about 3 days (around 1/24/2020).

## 2020-01-23 ENCOUNTER — TELEPHONE (OUTPATIENT)
Dept: ORTHOPEDICS | Facility: CLINIC | Age: 80
End: 2020-01-23

## 2020-01-23 NOTE — TELEPHONE ENCOUNTER
Spoke with Mr. Hermosillo and he will be stopping in to the office to have DR. Martinez look at his Toe after his Appt with PCP at 9:00am. Per Dr. Martinez he is to be seen.

## 2020-01-24 ENCOUNTER — OFFICE VISIT (OUTPATIENT)
Dept: FAMILY MEDICINE | Facility: CLINIC | Age: 80
End: 2020-01-24
Payer: MEDICARE

## 2020-01-24 ENCOUNTER — OFFICE VISIT (OUTPATIENT)
Dept: ORTHOPEDICS | Facility: CLINIC | Age: 80
End: 2020-01-24
Payer: MEDICARE

## 2020-01-24 VITALS — BODY MASS INDEX: 29.22 KG/M2 | HEIGHT: 76 IN | WEIGHT: 240 LBS

## 2020-01-24 VITALS
HEIGHT: 76 IN | SYSTOLIC BLOOD PRESSURE: 138 MMHG | DIASTOLIC BLOOD PRESSURE: 76 MMHG | BODY MASS INDEX: 29.22 KG/M2 | HEART RATE: 52 BPM | WEIGHT: 240 LBS

## 2020-01-24 DIAGNOSIS — S92.422D CLOSED DISPLACED FRACTURE OF DISTAL PHALANX OF LEFT GREAT TOE WITH ROUTINE HEALING, SUBSEQUENT ENCOUNTER: Primary | ICD-10-CM

## 2020-01-24 DIAGNOSIS — S96.909S: Primary | ICD-10-CM

## 2020-01-24 DIAGNOSIS — S91.109S: Primary | ICD-10-CM

## 2020-01-24 PROCEDURE — 99024 PR POST-OP FOLLOW-UP VISIT: ICD-10-PCS | Mod: S$GLB,,, | Performed by: ORTHOPAEDIC SURGERY

## 2020-01-24 PROCEDURE — 99999 PR PBB SHADOW E&M-EST. PATIENT-LVL II: CPT | Mod: PBBFAC,,, | Performed by: ORTHOPAEDIC SURGERY

## 2020-01-24 PROCEDURE — 1101F PR PT FALLS ASSESS DOC 0-1 FALLS W/OUT INJ PAST YR: ICD-10-PCS | Mod: S$GLB,,, | Performed by: NURSE PRACTITIONER

## 2020-01-24 PROCEDURE — 1101F PT FALLS ASSESS-DOCD LE1/YR: CPT | Mod: S$GLB,,, | Performed by: NURSE PRACTITIONER

## 2020-01-24 PROCEDURE — 1159F PR MEDICATION LIST DOCUMENTED IN MEDICAL RECORD: ICD-10-PCS | Mod: S$GLB,,, | Performed by: NURSE PRACTITIONER

## 2020-01-24 PROCEDURE — 1159F MED LIST DOCD IN RCRD: CPT | Mod: S$GLB,,, | Performed by: NURSE PRACTITIONER

## 2020-01-24 PROCEDURE — 99024 POSTOP FOLLOW-UP VISIT: CPT | Mod: S$GLB,,, | Performed by: ORTHOPAEDIC SURGERY

## 2020-01-24 PROCEDURE — 99999 PR PBB SHADOW E&M-EST. PATIENT-LVL II: ICD-10-PCS | Mod: PBBFAC,,, | Performed by: ORTHOPAEDIC SURGERY

## 2020-01-24 PROCEDURE — 99213 OFFICE O/P EST LOW 20 MIN: CPT | Mod: S$GLB,,, | Performed by: NURSE PRACTITIONER

## 2020-01-24 PROCEDURE — 99213 PR OFFICE/OUTPT VISIT, EST, LEVL III, 20-29 MIN: ICD-10-PCS | Mod: S$GLB,,, | Performed by: NURSE PRACTITIONER

## 2020-01-24 NOTE — PROGRESS NOTES
SUBJECTIVE:    Patient ID: Kartik Hermosillo is a 79 y.o. male.    Chief Complaint: Follow-up (no bottles// SW) and Injections (PNA 23// SW)    Pt presents for 3 day follow-up after concern for infection in his L great toe at the surgical site. Had previously been seen in Dr. Martinez's office and the wound was cleaned with peroxide, culture done, and pt put on Cipro. Culture came back staph and antibiotic was switched to Clindamycin in this office. X-ray foot done showed no sign of osteomyelitis and pt denied fever. Pt presents today relatively the same. Still denying fever. Foot not much more red than it was before. Pt continued to clean to site with peroxide after he was told not to. Dr. Martinez's office called wanting to see him today after his appointment here.      Appointment on 01/15/2020   Component Date Value Ref Range Status    AEROBIC CULTURE - FLUID 01/15/2020 *  Final                    Value:STAPHYLOCOCCUS AUREUS  Many  No other significant isolate      Gram Stain Result 01/15/2020 Rare WBC's   Final    Gram Stain Result 01/15/2020 Rare Gram positive cocci   Final    Gram Stain Result 01/15/2020 Rare Gram positive rods   Final    Anaerobic Culture 01/15/2020 No anaerobes isolated   Final   Admission on 12/18/2019, Discharged on 12/18/2019   Component Date Value Ref Range Status    WBC 12/18/2019 4.52  3.90 - 12.70 K/uL Final    RBC 12/18/2019 5.01  4.60 - 6.20 M/uL Final    Hemoglobin 12/18/2019 15.3  14.0 - 18.0 g/dL Final    Hematocrit 12/18/2019 45.6  40.0 - 54.0 % Final    Mean Corpuscular Volume 12/18/2019 91  82 - 98 fL Final    Mean Corpuscular Hemoglobin 12/18/2019 30.5  27.0 - 31.0 pg Final    Mean Corpuscular Hemoglobin Conc 12/18/2019 33.6  32.0 - 36.0 g/dL Final    RDW 12/18/2019 13.0  11.5 - 14.5 % Final    Platelets 12/18/2019 133* 150 - 350 K/uL Final    MPV 12/18/2019 11.0  9.2 - 12.9 fL Final    Immature Granulocytes 12/18/2019 0.2  0.0 - 0.5 % Final    Gran # (ANC)  12/18/2019 2.7  1.8 - 7.7 K/uL Final    Immature Grans (Abs) 12/18/2019 0.01  0.00 - 0.04 K/uL Final    Lymph # 12/18/2019 1.1  1.0 - 4.8 K/uL Final    Mono # 12/18/2019 0.5  0.3 - 1.0 K/uL Final    Eos # 12/18/2019 0.2  0.0 - 0.5 K/uL Final    Baso # 12/18/2019 0.05  0.00 - 0.20 K/uL Final    nRBC 12/18/2019 0  0 /100 WBC Final    Gran% 12/18/2019 60.4  38.0 - 73.0 % Final    Lymph% 12/18/2019 24.1  18.0 - 48.0 % Final    Mono% 12/18/2019 10.2  4.0 - 15.0 % Final    Eosinophil% 12/18/2019 4.0  0.0 - 8.0 % Final    Basophil% 12/18/2019 1.1  0.0 - 1.9 % Final    Differential Method 12/18/2019 Automated   Final    Sodium 12/18/2019 138  136 - 145 mmol/L Final    Potassium 12/18/2019 4.0  3.5 - 5.1 mmol/L Final    Chloride 12/18/2019 103  95 - 110 mmol/L Final    CO2 12/18/2019 28  23 - 29 mmol/L Final    Glucose 12/18/2019 143* 70 - 110 mg/dL Final    BUN, Bld 12/18/2019 18  8 - 23 mg/dL Final    Creatinine 12/18/2019 0.9  0.5 - 1.4 mg/dL Final    Calcium 12/18/2019 9.0  8.7 - 10.5 mg/dL Final    Total Protein 12/18/2019 7.0  6.0 - 8.4 g/dL Final    Albumin 12/18/2019 4.3  3.5 - 5.2 g/dL Final    Total Bilirubin 12/18/2019 1.3* 0.1 - 1.0 mg/dL Final    Alkaline Phosphatase 12/18/2019 63  55 - 135 U/L Final    AST 12/18/2019 28  10 - 40 U/L Final    ALT 12/18/2019 24  10 - 44 U/L Final    Anion Gap 12/18/2019 7* 8 - 16 mmol/L Final    eGFR if African American 12/18/2019 >60.0  >60 mL/min/1.73 m^2 Final    eGFR if non African American 12/18/2019 >60.0  >60 mL/min/1.73 m^2 Final    Specimen UA 12/18/2019 Urine, Clean Catch   Final    Color, UA 12/18/2019 Colorless* Yellow, Straw, Marianne Final    Appearance, UA 12/18/2019 Clear  Clear Final    pH, UA 12/18/2019 7.0  5.0 - 8.0 Final    Specific Gravity, UA 12/18/2019 1.010  1.005 - 1.030 Final    Protein, UA 12/18/2019 Negative  Negative Final    Glucose, UA 12/18/2019 Negative  Negative Final    Ketones, UA 12/18/2019 Negative  Negative  Final    Bilirubin (UA) 12/18/2019 Negative  Negative Final    Occult Blood UA 12/18/2019 Negative  Negative Final    Nitrite, UA 12/18/2019 Negative  Negative Final    Urobilinogen, UA 12/18/2019 Negative  Negative EU/dL Final    Leukocytes, UA 12/18/2019 Negative  Negative Final    PT 12/18/2019 13.6  10.6 - 14.8 sec Final    INR 12/18/2019 1.1   Final    aPTT 12/18/2019 32.6  23.6 - 33.3 sec Final    Hemoglobin A1C 12/18/2019 5.9  4.5 - 6.2 % Final    Estimated Avg Glucose 12/18/2019 123  68 - 131 mg/dL Final       History reviewed. No pertinent past medical history.  Past Surgical History:   Procedure Laterality Date    APPENDECTOMY      INCISION AND DRAINAGE Left 12/18/2019    Procedure: INCISION AND DRAINAGE;  Surgeon: Nick Martinez MD;  Location: [facility];  Service: Orthopedics;  Laterality: Left;    REPAIR OF EXTENSOR TENDON Left 12/18/2019    Procedure: REPAIR, TENDON, EXTENSOR;  Surgeon: Nick Martinez MD;  Location: [facility];  Service: Orthopedics;  Laterality: Left;  STANLEY ANA PAULALAKHWINDER NESBITT     Family History   Problem Relation Age of Onset    Diabetes Father        Marital Status: Single  Alcohol History:  reports that he drank alcohol.  Tobacco History:  reports that he has never smoked. He has never used smokeless tobacco.  Drug History:  reports that he does not use drugs.    Review of patient's allergies indicates:  No Known Allergies    Current Outpatient Medications:     aspirin (ECOTRIN) 81 MG EC tablet, Take 1 tablet by mouth once daily., Disp: , Rfl:     CANNABIDIOL, CBD, EXTRACT ORAL, Place 0.5 mLs under the tongue every 8 (eight) hours as needed. 5 mg/ml formula, Disp: , Rfl: 0    cephALEXin (KEFLEX) 500 MG capsule, TAKE 1 CAPSULE BY MOUTH 4 TIMES DAILY, Disp: , Rfl:     ciprofloxacin HCl (CIPRO) 500 MG tablet, Take 1 tablet (500 mg total) by mouth every 12 (twelve) hours. for 10 days, Disp: 20 tablet, Rfl: 0    clindamycin (CLEOCIN) 300 MG capsule, Take 1 capsule  "(300 mg total) by mouth 3 (three) times daily. for 10 days, Disp: 30 capsule, Rfl: 0    lidocaine HCl/epinephrine (BUFFERED LIDOCAINE, LIDOCAINE-EPINEPHRINE, SODIUM BICARB,), PLACE 0.5ML UNDER TONGUE EVERY 8 HOURS AS NEEDED FOR PAIN., Disp: , Rfl:     oxyCODONE-acetaminophen (PERCOCET)  mg per tablet, TAKE 1 TABLET BY MOUTH EVERY 4 TO 6 HOURS AS NEEDED (MAX 3 TABLETS PER DAY), Disp: , Rfl:     triamcinolone acetonide 0.1% (KENALOG) 0.1 % cream, 1 application to affected area, Disp: , Rfl:     Review of Systems   Constitutional: Negative for chills and fever.   HENT: Negative.    Respiratory: Negative.    Cardiovascular: Negative.  Negative for chest pain.   Gastrointestinal: Negative.    Musculoskeletal: Negative.    Skin:        Has continued cleaning wound with peroxide   Neurological: Negative.           Objective:      Vitals:    01/24/20 0843   BP: 138/76   Pulse: (!) 52   Weight: 108.9 kg (240 lb)   Height: 6' 4" (1.93 m)     Body mass index is 29.21 kg/m².  Physical Exam   Constitutional: He appears well-developed and well-nourished.   HENT:   Head: Normocephalic.   Cardiovascular: Normal rate, regular rhythm and normal heart sounds.   Pulmonary/Chest: Effort normal and breath sounds normal.   Abdominal: Soft.   Musculoskeletal: Normal range of motion.   Neurological: He is alert.   Skin: Skin is warm and dry. There is erythema.   Stitch wound across toe is now open with white/yellow slough across the top. The toe appears slightly more reddened than previously.    Psychiatric: He has a normal mood and affect.         Assessment:       1. Open wound of toe with tendon involvement, sequela         Plan:       Open wound of toe with tendon involvement, sequela  - Pt advised to stop using Peroxide to clean wound  - Needs to see Dr. Martinez's office for further guidance at this point.    Follow up if symptoms worsen or fail to improve.            "

## 2020-01-24 NOTE — PROGRESS NOTES
1/24/2020    History reviewed. No pertinent past medical history.    Past Surgical History:   Procedure Laterality Date    APPENDECTOMY      INCISION AND DRAINAGE Left 12/18/2019    Procedure: INCISION AND DRAINAGE;  Surgeon: Nick Martinez MD;  Location: [facility] OR;  Service: Orthopedics;  Laterality: Left;    REPAIR OF EXTENSOR TENDON Left 12/18/2019    Procedure: REPAIR, TENDON, EXTENSOR;  Surgeon: Nikc Martinez MD;  Location: [facility] OR;  Service: Orthopedics;  Laterality: Left;  PRACHI ANGEL       Current Outpatient Medications   Medication Sig    aspirin (ECOTRIN) 81 MG EC tablet Take 1 tablet by mouth once daily.    CANNABIDIOL, CBD, EXTRACT ORAL Place 0.5 mLs under the tongue every 8 (eight) hours as needed. 5 mg/ml formula    cephALEXin (KEFLEX) 500 MG capsule TAKE 1 CAPSULE BY MOUTH 4 TIMES DAILY    ciprofloxacin HCl (CIPRO) 500 MG tablet Take 1 tablet (500 mg total) by mouth every 12 (twelve) hours. for 10 days    clindamycin (CLEOCIN) 300 MG capsule Take 1 capsule (300 mg total) by mouth 3 (three) times daily. for 10 days    lidocaine HCl/epinephrine (BUFFERED LIDOCAINE, LIDOCAINE-EPINEPHRINE, SODIUM BICARB,) PLACE 0.5ML UNDER TONGUE EVERY 8 HOURS AS NEEDED FOR PAIN.    oxyCODONE-acetaminophen (PERCOCET)  mg per tablet TAKE 1 TABLET BY MOUTH EVERY 4 TO 6 HOURS AS NEEDED (MAX 3 TABLETS PER DAY)    triamcinolone acetonide 0.1% (KENALOG) 0.1 % cream 1 application to affected area     No current facility-administered medications for this visit.        Review of patient's allergies indicates:  No Known Allergies    Family History   Problem Relation Age of Onset    Diabetes Father        Social History     Socioeconomic History    Marital status: Single     Spouse name: Not on file    Number of children: Not on file    Years of education: Not on file    Highest education level: Not on file   Occupational History    Not on file   Social Needs    Financial resource strain:  Not on file    Food insecurity:     Worry: Not on file     Inability: Not on file    Transportation needs:     Medical: Not on file     Non-medical: Not on file   Tobacco Use    Smoking status: Never Smoker    Smokeless tobacco: Never Used   Substance and Sexual Activity    Alcohol use: Not Currently    Drug use: Never    Sexual activity: Not on file   Lifestyle    Physical activity:     Days per week: Not on file     Minutes per session: Not on file    Stress: Not on file   Relationships    Social connections:     Talks on phone: Not on file     Gets together: Not on file     Attends Jain service: Not on file     Active member of club or organization: Not on file     Attends meetings of clubs or organizations: Not on file     Relationship status: Not on file   Other Topics Concern    Not on file   Social History Narrative    Not on file       Chief Complaint:   Chief Complaint   Patient presents with    Left Foot - Pain, Post-op Evaluation     DOS: 12/18/2019 -- 5 wks & 2 days S/P OPEN AVULSION FRACTURE OF THE DISTAL PHALANX OF THE DIP JOINT LEFT GREAT TOE.         History of present illness:    This is a 79 y.o. year old male who complains of patient is now 5 and half weeks status post repair of a laceration to the dorsum of the right great toe with a avulsion of the extensor tendon the patient was placed in they the wound was irrigated and closed and 2 pins were placed across the joint keep to in extension patient returns today for follow-up    Review of Systems:    Constitution: Denies chills, fever, and sweats.  HENT: Denies headaches or blurry vision.  Cardiovascular: Denies chest pain or irregular heart beat.  Respiratory: Denies cough or shortness of breath.  Gastrointestinal: Denies abdominal pain, nausea, or vomiting.  Musculoskeletal:  Denies muscle cramps.  Neurological: Denies dizziness or focal weakness.  Psychiatric/Behavioral: Normal mental status.  Hematologic/Lymphatic: Denies  "bleeding problem or easy bruising/bleeding.  Skin: Denies rash or suspicious lesions.    Examination:    Vital Signs:    Vitals:    01/24/20 1030   Weight: 108.9 kg (240 lb)   Height: 6' 4" (1.93 m)   PainSc:   2   PainLoc: Toe       Body mass index is 29.21 kg/m².    This a well-developed, well nourished patient in no acute distress.    Alert and oriented x 3 and cooperative to examination.       Physical Exam:  Right great-the pin tracks are clean is no evidence of any purulence around the dorsal incision there was no some necrotic tissue there was no purulent material expressed from the wound area the toes in extension there is some redness in the toe the area was debrided there is some granulation tissue underneath the necrotic tissue was debrided and cleaned with some peroxide and a dressing applied his pin tracts appear to be clean as mentioned there is no purulent drainage    Imaging:  No x-rays       Assessment: Closed displaced fracture of distal phalanx of left great toe with routine healing, subsequent encounter        Plan:  Patient has been instructed to be clean the area with normal saline daily and covered with a Band-Aid will see him back next week to reinspect his wound he is presently on clindamycin he is growing Staph aureus which is sensitive to clindamycin.      DISCLAIMER: This note may have been dictated using voice recognition software and may contain grammatical errors.     NOTE: Consult report sent to referring provider via EPIC EMR.  "

## 2020-01-28 ENCOUNTER — OFFICE VISIT (OUTPATIENT)
Dept: ORTHOPEDICS | Facility: CLINIC | Age: 80
End: 2020-01-28
Payer: MEDICARE

## 2020-01-28 VITALS
DIASTOLIC BLOOD PRESSURE: 81 MMHG | WEIGHT: 240 LBS | HEART RATE: 60 BPM | BODY MASS INDEX: 29.22 KG/M2 | HEIGHT: 76 IN | SYSTOLIC BLOOD PRESSURE: 187 MMHG

## 2020-01-28 DIAGNOSIS — S92.422D CLOSED DISPLACED FRACTURE OF DISTAL PHALANX OF LEFT GREAT TOE WITH ROUTINE HEALING, SUBSEQUENT ENCOUNTER: Primary | ICD-10-CM

## 2020-01-28 PROCEDURE — 99999 PR PBB SHADOW E&M-EST. PATIENT-LVL III: CPT | Mod: PBBFAC,,, | Performed by: ORTHOPAEDIC SURGERY

## 2020-01-28 PROCEDURE — 99024 PR POST-OP FOLLOW-UP VISIT: ICD-10-PCS | Mod: S$GLB,,, | Performed by: ORTHOPAEDIC SURGERY

## 2020-01-28 PROCEDURE — 99024 POSTOP FOLLOW-UP VISIT: CPT | Mod: S$GLB,,, | Performed by: ORTHOPAEDIC SURGERY

## 2020-01-28 PROCEDURE — 99999 PR PBB SHADOW E&M-EST. PATIENT-LVL III: ICD-10-PCS | Mod: PBBFAC,,, | Performed by: ORTHOPAEDIC SURGERY

## 2020-01-28 RX ORDER — CLINDAMYCIN HYDROCHLORIDE 300 MG/1
300 CAPSULE ORAL 3 TIMES DAILY
Qty: 30 CAPSULE | Refills: 0 | Status: SHIPPED | OUTPATIENT
Start: 2020-01-28 | End: 2020-02-21

## 2020-01-28 NOTE — PROGRESS NOTES
1/28/2020    History reviewed. No pertinent past medical history.    Past Surgical History:   Procedure Laterality Date    APPENDECTOMY      INCISION AND DRAINAGE Left 12/18/2019    Procedure: INCISION AND DRAINAGE;  Surgeon: Nick Martinez MD;  Location: [facility] OR;  Service: Orthopedics;  Laterality: Left;    REPAIR OF EXTENSOR TENDON Left 12/18/2019    Procedure: REPAIR, TENDON, EXTENSOR;  Surgeon: Nick Martinez MD;  Location: [facility] OR;  Service: Orthopedics;  Laterality: Left;  PRACHI ANGEL       Current Outpatient Medications   Medication Sig    aspirin (ECOTRIN) 81 MG EC tablet Take 1 tablet by mouth once daily.    CANNABIDIOL, CBD, EXTRACT ORAL Place 0.5 mLs under the tongue every 8 (eight) hours as needed. 5 mg/ml formula    cephALEXin (KEFLEX) 500 MG capsule TAKE 1 CAPSULE BY MOUTH 4 TIMES DAILY    clindamycin (CLEOCIN) 300 MG capsule Take 1 capsule (300 mg total) by mouth 3 (three) times daily. for 10 days    lidocaine HCl/epinephrine (BUFFERED LIDOCAINE, LIDOCAINE-EPINEPHRINE, SODIUM BICARB,) PLACE 0.5ML UNDER TONGUE EVERY 8 HOURS AS NEEDED FOR PAIN.    oxyCODONE-acetaminophen (PERCOCET)  mg per tablet TAKE 1 TABLET BY MOUTH EVERY 4 TO 6 HOURS AS NEEDED (MAX 3 TABLETS PER DAY)    triamcinolone acetonide 0.1% (KENALOG) 0.1 % cream 1 application to affected area     No current facility-administered medications for this visit.        Review of patient's allergies indicates:  No Known Allergies    Family History   Problem Relation Age of Onset    Diabetes Father        Social History     Socioeconomic History    Marital status: Single     Spouse name: Not on file    Number of children: Not on file    Years of education: Not on file    Highest education level: Not on file   Occupational History    Not on file   Social Needs    Financial resource strain: Not on file    Food insecurity:     Worry: Not on file     Inability: Not on file    Transportation needs:      Medical: Not on file     Non-medical: Not on file   Tobacco Use    Smoking status: Never Smoker    Smokeless tobacco: Never Used   Substance and Sexual Activity    Alcohol use: Not Currently    Drug use: Never    Sexual activity: Not on file   Lifestyle    Physical activity:     Days per week: Not on file     Minutes per session: Not on file    Stress: Not on file   Relationships    Social connections:     Talks on phone: Not on file     Gets together: Not on file     Attends Zoroastrianism service: Not on file     Active member of club or organization: Not on file     Attends meetings of clubs or organizations: Not on file     Relationship status: Not on file   Other Topics Concern    Not on file   Social History Narrative    Not on file       Chief Complaint:   Chief Complaint   Patient presents with    Left Foot - Post-op Evaluation     DOS: 12/18/2019 - 5 wks & 6 days S/P OPEN AVULSION FX DISTAL PHALANX OF DIP JOINT LEFT GREAT TOE. Denies any pain today. Ambulating w/ crutches today. C/o swelling & spontaneous sharp pain in great toe; unaware of what triggers it.          History of present illness:    This is a 79 y.o. year old male who complains of patient is now 6 weeks status post pinning of an avulsion fracture of the extensor tendon of the great toe patient was groin some Staph aureus which is sensitive to antibiotics    Review of Systems:    Constitution: Denies chills, fever, and sweats.  HENT: Denies headaches or blurry vision.  Cardiovascular: Denies chest pain or irregular heart beat.  Respiratory: Denies cough or shortness of breath.  Gastrointestinal: Denies abdominal pain, nausea, or vomiting.  Musculoskeletal:  Denies muscle cramps.  Neurological: Denies dizziness or focal weakness.  Psychiatric/Behavioral: Normal mental status.  Hematologic/Lymphatic: Denies bleeding problem or easy bruising/bleeding.  Skin: Denies rash or suspicious lesions.    Examination:    Vital Signs:    Vitals:     "01/28/20 0857   BP: (!) 187/81   Pulse: 60   Weight: 108.9 kg (240 lb)   Height: 6' 4" (1.93 m)   PainSc: 0-No pain   PainLoc: Toe       Body mass index is 29.21 kg/m².    This a well-developed, well nourished patient in no acute distress.    Alert and oriented x 3 and cooperative to examination.       Physical Exam:  Left foot-the foot toe great toe looks better today there is less redness of there is no drainage he has little eschar on the skin on the wound it was debrided lightly there was no pus the pin tracts appear to be clean    Imaging:  No x-rays       Assessment: Closed displaced fracture of distal phalanx of left great toe with routine healing, subsequent encounter        Plan:  Debrided the wound today and have this patient come back in 1 week and we can pull his pins he should stay on the clindamycin will [facility] debriding agent on the wound and to promote granulation tissue      DISCLAIMER: This note may have been dictated using voice recognition software and may contain grammatical errors.     NOTE: Consult report sent to referring provider via EPIC EMR.  "

## 2020-01-31 ENCOUNTER — TELEPHONE (OUTPATIENT)
Dept: ORTHOPEDICS | Facility: CLINIC | Age: 80
End: 2020-01-31

## 2020-01-31 DIAGNOSIS — S92.422D CLOSED DISPLACED FRACTURE OF DISTAL PHALANX OF LEFT GREAT TOE WITH ROUTINE HEALING, SUBSEQUENT ENCOUNTER: Primary | ICD-10-CM

## 2020-01-31 NOTE — TELEPHONE ENCOUNTER
----- Message from Marion Blank MA sent at 1/31/2020  9:55 AM CST -----  Contact: katey   Has not heard from wound care clinic   Please advise   Call back

## 2020-02-03 ENCOUNTER — TELEPHONE (OUTPATIENT)
Dept: ORTHOPEDICS | Facility: CLINIC | Age: 80
End: 2020-02-03

## 2020-02-03 NOTE — TELEPHONE ENCOUNTER
----- Message from Brayan Del Real MA sent at 2/3/2020  3:39 PM CST -----  Contact: Renay [facility]/ [facility]  Requesting Office visit notes supporting patients wound care to obtain prior authorization  Call back   Fax

## 2020-02-06 ENCOUNTER — TELEPHONE (OUTPATIENT)
Dept: ORTHOPEDICS | Facility: CLINIC | Age: 80
End: 2020-02-06

## 2020-02-06 NOTE — TELEPHONE ENCOUNTER
----- Message from Marion Blank MA sent at 2/6/2020 12:38 PM CST -----  Contact: katey   Wants to know what going to happen at tomorrow post op appt   Call back

## 2020-02-07 ENCOUNTER — OFFICE VISIT (OUTPATIENT)
Dept: ORTHOPEDICS | Facility: CLINIC | Age: 80
End: 2020-02-07
Payer: MEDICARE

## 2020-02-07 VITALS
WEIGHT: 240 LBS | HEIGHT: 76 IN | DIASTOLIC BLOOD PRESSURE: 82 MMHG | SYSTOLIC BLOOD PRESSURE: 150 MMHG | HEART RATE: 60 BPM | BODY MASS INDEX: 29.22 KG/M2

## 2020-02-07 DIAGNOSIS — S92.422D CLOSED DISPLACED FRACTURE OF DISTAL PHALANX OF LEFT GREAT TOE WITH ROUTINE HEALING, SUBSEQUENT ENCOUNTER: Primary | ICD-10-CM

## 2020-02-07 PROCEDURE — 99024 POSTOP FOLLOW-UP VISIT: CPT | Mod: S$GLB,,, | Performed by: ORTHOPAEDIC SURGERY

## 2020-02-07 PROCEDURE — 99024 PR POST-OP FOLLOW-UP VISIT: ICD-10-PCS | Mod: S$GLB,,, | Performed by: ORTHOPAEDIC SURGERY

## 2020-02-07 PROCEDURE — 99999 PR PBB SHADOW E&M-EST. PATIENT-LVL III: CPT | Mod: PBBFAC,,, | Performed by: ORTHOPAEDIC SURGERY

## 2020-02-07 PROCEDURE — 99999 PR PBB SHADOW E&M-EST. PATIENT-LVL III: ICD-10-PCS | Mod: PBBFAC,,, | Performed by: ORTHOPAEDIC SURGERY

## 2020-02-07 NOTE — PROGRESS NOTES
2/7/2020    History reviewed. No pertinent past medical history.    Past Surgical History:   Procedure Laterality Date    APPENDECTOMY      INCISION AND DRAINAGE Left 12/18/2019    Procedure: INCISION AND DRAINAGE;  Surgeon: Nick Martinez MD;  Location: [facility] OR;  Service: Orthopedics;  Laterality: Left;    REPAIR OF EXTENSOR TENDON Left 12/18/2019    Procedure: REPAIR, TENDON, EXTENSOR;  Surgeon: Nick Martinez MD;  Location: [facility] OR;  Service: Orthopedics;  Laterality: Left;  PRACHI ANGEL       Current Outpatient Medications   Medication Sig    aspirin (ECOTRIN) 81 MG EC tablet Take 1 tablet by mouth once daily.    CANNABIDIOL, CBD, EXTRACT ORAL Place 0.5 mLs under the tongue every 8 (eight) hours as needed. 5 mg/ml formula    cephALEXin (KEFLEX) 500 MG capsule TAKE 1 CAPSULE BY MOUTH 4 TIMES DAILY    clindamycin (CLEOCIN) 300 MG capsule Take 1 capsule (300 mg total) by mouth 3 (three) times daily.    lidocaine HCl/epinephrine (BUFFERED LIDOCAINE, LIDOCAINE-EPINEPHRINE, SODIUM BICARB,) PLACE 0.5ML UNDER TONGUE EVERY 8 HOURS AS NEEDED FOR PAIN.    oxyCODONE-acetaminophen (PERCOCET)  mg per tablet TAKE 1 TABLET BY MOUTH EVERY 4 TO 6 HOURS AS NEEDED (MAX 3 TABLETS PER DAY)    triamcinolone acetonide 0.1% (KENALOG) 0.1 % cream 1 application to affected area     No current facility-administered medications for this visit.        Review of patient's allergies indicates:  No Known Allergies    Family History   Problem Relation Age of Onset    Diabetes Father        Social History     Socioeconomic History    Marital status: Single     Spouse name: Not on file    Number of children: Not on file    Years of education: Not on file    Highest education level: Not on file   Occupational History    Not on file   Social Needs    Financial resource strain: Not on file    Food insecurity:     Worry: Not on file     Inability: Not on file    Transportation needs:     Medical: Not on  file     Non-medical: Not on file   Tobacco Use    Smoking status: Never Smoker    Smokeless tobacco: Never Used   Substance and Sexual Activity    Alcohol use: Not Currently    Drug use: Never    Sexual activity: Not on file   Lifestyle    Physical activity:     Days per week: Not on file     Minutes per session: Not on file    Stress: Not on file   Relationships    Social connections:     Talks on phone: Not on file     Gets together: Not on file     Attends Yazidi service: Not on file     Active member of club or organization: Not on file     Attends meetings of clubs or organizations: Not on file     Relationship status: Not on file   Other Topics Concern    Not on file   Social History Narrative    Not on file       Chief Complaint:   Chief Complaint   Patient presents with    Left Foot - Post-op Evaluation     DOS: 12/18/2019 - 7 wks & 2 days S/P OPEN AVULSION FX DISTAL PHALANX OF DIP JOINT LEFT GREAT TOE. Wound care at [facility] & RX for Clindamycin. Here to have pins removed. Tacking Clinda as prescribed. Wound Care Visit on 2/10/20.          History of present illness:    This is a 79 y.o. year old male who complains of patient is now over 7 weeks status post repair of extensor tendon to the left great toe he has been having some problems with the dorsal skin which has been going to going some debridement and wound care we he is here today to have the pins removed from his toe    Review of Systems:    Constitution: Denies chills, fever, and sweats.  HENT: Denies headaches or blurry vision.  Cardiovascular: Denies chest pain or irregular heart beat.  Respiratory: Denies cough or shortness of breath.  Gastrointestinal: Denies abdominal pain, nausea, or vomiting.  Musculoskeletal:  Denies muscle cramps.  Neurological: Denies dizziness or focal weakness.  Psychiatric/Behavioral: Normal mental status.  Hematologic/Lymphatic: Denies bleeding problem or easy bruising/bleeding.  Skin: Denies rash or  "suspicious lesions.    Examination:    Vital Signs:    Vitals:    02/07/20 0934   BP: (!) 150/82   Pulse: 60   Weight: 108.9 kg (240 lb)   Height: 6' 4" (1.93 m)   PainSc: 0-No pain   PainLoc: Toe       Body mass index is 29.21 kg/m².    This a well-developed, well nourished patient in no acute distress.    Alert and oriented x 3 and cooperative to examination.       Physical Exam:  Left great toe-the rip in his removed the pin tracts look clean the wound was debrided has some eschar in the surface which was debrided he appears to be having a good granulation bed underneath there is no gross infection the redness in the toe is resolving the swelling is going down    Imaging:  No x-rays       Assessment: Closed displaced fracture of distal phalanx of left great toe with routine healing, subsequent encounter        Plan:  The patient is scheduled to go to Wound Care on Monday that is 3 days away he is presently cleaning with normal saline the wound care people should put a debriding agent and a gentle increased granulation tissue he is in a stain is walking boot and protect the toe I will see him back in 1 week      DISCLAIMER: This note may have been dictated using voice recognition software and may contain grammatical errors.     NOTE: Consult report sent to referring provider via EPIC EMR.  "

## 2020-02-11 ENCOUNTER — OFFICE VISIT (OUTPATIENT)
Dept: WOUND CARE | Facility: HOSPITAL | Age: 80
End: 2020-02-11
Attending: PODIATRIST
Payer: MEDICARE

## 2020-02-11 VITALS
DIASTOLIC BLOOD PRESSURE: 78 MMHG | SYSTOLIC BLOOD PRESSURE: 138 MMHG | RESPIRATION RATE: 16 BRPM | HEART RATE: 68 BPM | TEMPERATURE: 97 F

## 2020-02-11 DIAGNOSIS — L97.525 NON-PRESSURE CHRONIC ULCER OF OTHER PART OF LEFT FOOT WITH MUSCLE INVOLVEMENT WITHOUT EVIDENCE OF NECROSIS: ICD-10-CM

## 2020-02-11 DIAGNOSIS — T81.31XA DEHISCENCE OF OPERATIVE WOUND, INITIAL ENCOUNTER: Primary | ICD-10-CM

## 2020-02-11 PROCEDURE — 99203 PR OFFICE/OUTPT VISIT, NEW, LEVL III, 30-44 MIN: ICD-10-PCS | Mod: 25,,, | Performed by: PODIATRIST

## 2020-02-11 PROCEDURE — 11043 DBRDMT MUSC&/FSCA 1ST 20/<: CPT | Performed by: PODIATRIST

## 2020-02-11 PROCEDURE — 11043 DBRDMT MUSC&/FSCA 1ST 20/<: CPT | Mod: ,,, | Performed by: PODIATRIST

## 2020-02-11 PROCEDURE — 1126F AMNT PAIN NOTED NONE PRSNT: CPT | Mod: ,,, | Performed by: PODIATRIST

## 2020-02-11 PROCEDURE — 99203 OFFICE O/P NEW LOW 30 MIN: CPT | Mod: 25,,, | Performed by: PODIATRIST

## 2020-02-11 PROCEDURE — 1101F PR PT FALLS ASSESS DOC 0-1 FALLS W/OUT INJ PAST YR: ICD-10-PCS | Mod: ,,, | Performed by: PODIATRIST

## 2020-02-11 PROCEDURE — 1126F PR PAIN SEVERITY QUANTIFIED, NO PAIN PRESENT: ICD-10-PCS | Mod: ,,, | Performed by: PODIATRIST

## 2020-02-11 PROCEDURE — 1159F PR MEDICATION LIST DOCUMENTED IN MEDICAL RECORD: ICD-10-PCS | Mod: ,,, | Performed by: PODIATRIST

## 2020-02-11 PROCEDURE — 1159F MED LIST DOCD IN RCRD: CPT | Mod: ,,, | Performed by: PODIATRIST

## 2020-02-11 PROCEDURE — 11043 PR DEBRIDEMENT, SKIN, SUB-Q TISSUE,MUSCLE,=<20 SQ CM: ICD-10-PCS | Mod: ,,, | Performed by: PODIATRIST

## 2020-02-11 PROCEDURE — 99214 OFFICE O/P EST MOD 30 MIN: CPT | Mod: 25 | Performed by: PODIATRIST

## 2020-02-11 PROCEDURE — 1101F PT FALLS ASSESS-DOCD LE1/YR: CPT | Mod: ,,, | Performed by: PODIATRIST

## 2020-02-11 RX ORDER — COLLAGENASE SANTYL 250 [ARB'U]/G
OINTMENT TOPICAL DAILY
Qty: 30 G | Refills: 3 | Status: SHIPPED | OUTPATIENT
Start: 2020-02-11 | End: 2020-03-12

## 2020-02-11 NOTE — PROGRESS NOTES
[address]  [place] [address]  Phone: [phone]   [phone]    Patient's PCP:Raphael Vargas MD  Referring Provider: No ref. provider found    Subjective:      Chief Complaint:: Wound Care (I'm here for the wound on my toe )    CHARLIE Hermosillo is a 79 y.o. male who presents with a complaint of a wound on his left great toe lasting for 8 weeks. Onset of the symptoms was open fracture of left great toe, which was surgically repaired by Dr. Martinez.  However, patient went on to develop a wound of the left great toe.  Current symptoms include pain and eschar.   Symptoms have increased. Treatment to date have included cleaning with normal saline and leaving it open to air, with no dressing.  He states this is what he was instructed to do.      He was referred to the wound Care Center by Dr. Martinez. He was placed on oral clindamycin at his last visit with Dr. Martinez on 2/7/20.    Today is the first time I am seeing the patient. I have reviewed his medical chart in detail.  I am concerned that he has underlying osteomyelitis of the left great toe.  I am able to palpate bone underlying the necrotic tissue on the top of his toe.  No drainage to culture.  Therefore, I will be ordering blood work and an MRI of his left foot. I discussed with him the treatment options if the MRI shows osteomyelitis of the toe.   I will include an A1c in his blood work as he has been told in the past that he is a borderline diabetic.    Until the results of the lab work and MRI are back, patient is to dress his toe consisting of Santyl, 4x4s, and gauze every day. He will also be receiving supplies I am ordering to change his dressings. Supplies will include Iodoflex and patient will begin dressing his toe using Iodoflex every other day, if he is not able to afford the santyl medication.      Much of the documentation for this visit was completed in the Wound Docs system.  Please see the attached documentation  for further details about the patient's care. Scanned under the Media tab.      Karis Rizvi DPM       Vitals:    02/11/20 1406   BP: 138/78   Pulse: 68   Resp: 16   Temp: 97.1 °F (36.2 °C)     Shoe Size:     Past Surgical History:   Procedure Laterality Date    APPENDECTOMY      INCISION AND DRAINAGE Left 12/18/2019    Procedure: INCISION AND DRAINAGE;  Surgeon: Nick Martinez MD;  Location: [facility] OR;  Service: Orthopedics;  Laterality: Left;    REPAIR OF EXTENSOR TENDON Left 12/18/2019    Procedure: REPAIR, TENDON, EXTENSOR;  Surgeon: Nick Martinez MD;  Location: [facility] OR;  Service: Orthopedics;  Laterality: Left;  PRACHI ANGEL     History reviewed. No pertinent past medical history.  Family History   Problem Relation Age of Onset    Diabetes Father         Social History:   Marital Status: Single  Alcohol History:  reports that he drank alcohol.  Tobacco History:  reports that he has never smoked. He has never used smokeless tobacco.  Drug History:  reports that he does not use drugs.    Review of patient's allergies indicates:  No Known Allergies    Current Outpatient Medications   Medication Sig Dispense Refill    aspirin (ECOTRIN) 81 MG EC tablet Take 1 tablet by mouth once daily.      CANNABIDIOL, CBD, EXTRACT ORAL Place 0.5 mLs under the tongue every 8 (eight) hours as needed. 5 mg/ml formula  0    cephALEXin (KEFLEX) 500 MG capsule TAKE 1 CAPSULE BY MOUTH 4 TIMES DAILY      clindamycin (CLEOCIN) 300 MG capsule Take 1 capsule (300 mg total) by mouth 3 (three) times daily. 30 capsule 0    lidocaine HCl/epinephrine (BUFFERED LIDOCAINE, LIDOCAINE-EPINEPHRINE, SODIUM BICARB,) PLACE 0.5ML UNDER TONGUE EVERY 8 HOURS AS NEEDED FOR PAIN.      oxyCODONE-acetaminophen (PERCOCET)  mg per tablet TAKE 1 TABLET BY MOUTH EVERY 4 TO 6 HOURS AS NEEDED (MAX 3 TABLETS PER DAY)      SANTYL ointment Apply topically once daily. 30 g 3    triamcinolone acetonide 0.1% (KENALOG) 0.1 % cream 1  application to affected area       No current facility-administered medications for this visit.        Review of Systems      Objective:        Physical Exam:   Foot Exam  Physical Exam  Much of the documentation for this visit was completed in the Wound Docs system.  Please see the attached documentation for further details about the patient's care. Scanned under the Media tab.        Karis Rizvi DPM   Imaging:            Assessment:       1. Dehiscence of operative wound, initial encounter    2. 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  Dispense: 30 g; Refill: 3      Follow up in about 1 year (around 2/11/2021) for wound care.    Procedures - Much of the documentation for this visit was completed in the Wound Docs system.  Please see the attached documentation for further details about the  patient's care. Scanned under the Media tab.        Karis Rizvi DPM     Counseling:     I provided patient education verbally regarding:   Patient diagnosis, treatment options, as well as alternatives, risks, and benefits.     This note was created using Dragon voice recognition software that occasionally misinterpreted phrases or words.

## 2020-02-14 ENCOUNTER — TELEPHONE (OUTPATIENT)
Dept: PODIATRY | Facility: CLINIC | Age: 80
End: 2020-02-14

## 2020-02-14 ENCOUNTER — TELEPHONE (OUTPATIENT)
Dept: ORTHOPEDICS | Facility: CLINIC | Age: 80
End: 2020-02-14

## 2020-02-14 ENCOUNTER — OFFICE VISIT (OUTPATIENT)
Dept: ORTHOPEDICS | Facility: CLINIC | Age: 80
End: 2020-02-14
Payer: MEDICARE

## 2020-02-14 VITALS
SYSTOLIC BLOOD PRESSURE: 166 MMHG | HEIGHT: 76 IN | WEIGHT: 240 LBS | HEART RATE: 70 BPM | DIASTOLIC BLOOD PRESSURE: 98 MMHG | BODY MASS INDEX: 29.22 KG/M2

## 2020-02-14 DIAGNOSIS — S92.422D CLOSED DISPLACED FRACTURE OF DISTAL PHALANX OF LEFT GREAT TOE WITH ROUTINE HEALING, SUBSEQUENT ENCOUNTER: Primary | ICD-10-CM

## 2020-02-14 PROCEDURE — 99024 PR POST-OP FOLLOW-UP VISIT: ICD-10-PCS | Mod: S$GLB,,, | Performed by: ORTHOPAEDIC SURGERY

## 2020-02-14 PROCEDURE — 99999 PR PBB SHADOW E&M-EST. PATIENT-LVL III: ICD-10-PCS | Mod: PBBFAC,,, | Performed by: ORTHOPAEDIC SURGERY

## 2020-02-14 PROCEDURE — 99024 POSTOP FOLLOW-UP VISIT: CPT | Mod: S$GLB,,, | Performed by: ORTHOPAEDIC SURGERY

## 2020-02-14 PROCEDURE — 99999 PR PBB SHADOW E&M-EST. PATIENT-LVL III: CPT | Mod: PBBFAC,,, | Performed by: ORTHOPAEDIC SURGERY

## 2020-02-14 NOTE — TELEPHONE ENCOUNTER
Received carrier authorization for MRI.  Called pt and lmovm to return call to office Monday morning.  Need to inform the patient that his MRI was approved and let pt know [facility] should contact pt to schedule or pt can call to schedule MRI.

## 2020-02-14 NOTE — PROGRESS NOTES
2/14/2020    History reviewed. No pertinent past medical history.    Past Surgical History:   Procedure Laterality Date    APPENDECTOMY      INCISION AND DRAINAGE Left 12/18/2019    Procedure: INCISION AND DRAINAGE;  Surgeon: Nick Martinez MD;  Location: [facility] OR;  Service: Orthopedics;  Laterality: Left;    REPAIR OF EXTENSOR TENDON Left 12/18/2019    Procedure: REPAIR, TENDON, EXTENSOR;  Surgeon: Nick Martinez MD;  Location: [facility] OR;  Service: Orthopedics;  Laterality: Left;  PRACHI ANGEL       Current Outpatient Medications   Medication Sig    aspirin (ECOTRIN) 81 MG EC tablet Take 1 tablet by mouth once daily.    CANNABIDIOL, CBD, EXTRACT ORAL Place 0.5 mLs under the tongue every 8 (eight) hours as needed. 5 mg/ml formula    cephALEXin (KEFLEX) 500 MG capsule TAKE 1 CAPSULE BY MOUTH 4 TIMES DAILY    clindamycin (CLEOCIN) 300 MG capsule Take 1 capsule (300 mg total) by mouth 3 (three) times daily.    lidocaine HCl/epinephrine (BUFFERED LIDOCAINE, LIDOCAINE-EPINEPHRINE, SODIUM BICARB,) PLACE 0.5ML UNDER TONGUE EVERY 8 HOURS AS NEEDED FOR PAIN.    oxyCODONE-acetaminophen (PERCOCET)  mg per tablet TAKE 1 TABLET BY MOUTH EVERY 4 TO 6 HOURS AS NEEDED (MAX 3 TABLETS PER DAY)    SANTYL ointment Apply topically once daily.    triamcinolone acetonide 0.1% (KENALOG) 0.1 % cream 1 application to affected area     No current facility-administered medications for this visit.        Review of patient's allergies indicates:  No Known Allergies    Family History   Problem Relation Age of Onset    Diabetes Father        Social History     Socioeconomic History    Marital status: Single     Spouse name: Not on file    Number of children: Not on file    Years of education: Not on file    Highest education level: Not on file   Occupational History    Not on file   Social Needs    Financial resource strain: Not on file    Food insecurity:     Worry: Not on file     Inability: Not on file  "   Transportation needs:     Medical: Not on file     Non-medical: Not on file   Tobacco Use    Smoking status: Never Smoker    Smokeless tobacco: Never Used   Substance and Sexual Activity    Alcohol use: Not Currently    Drug use: Never    Sexual activity: Not on file   Lifestyle    Physical activity:     Days per week: Not on file     Minutes per session: Not on file    Stress: Not on file   Relationships    Social connections:     Talks on phone: Not on file     Gets together: Not on file     Attends Spiritism service: Not on file     Active member of club or organization: Not on file     Attends meetings of clubs or organizations: Not on file     Relationship status: Not on file   Other Topics Concern    Not on file   Social History Narrative    Not on file       Chief Complaint:   Chief Complaint   Patient presents with    Left Foot - Post-op Evaluation     DOS: 12/18/2019 -- 8 wks 2 days S/P OPEN AVULSION FX DISTAL PHALANX OF DIP JOINT LEFT GREAT TOE         History of present illness:    This is a 79 y.o. year old male who complains of patient is status post pinning of an avulsion of the extensor tendon and open joint to the IP joint of the left great toe    Review of Systems:    Constitution: Denies chills, fever, and sweats.  HENT: Denies headaches or blurry vision.  Cardiovascular: Denies chest pain or irregular heart beat.  Respiratory: Denies cough or shortness of breath.  Gastrointestinal: Denies abdominal pain, nausea, or vomiting.  Musculoskeletal:  Denies muscle cramps.  Neurological: Denies dizziness or focal weakness.  Psychiatric/Behavioral: Normal mental status.  Hematologic/Lymphatic: Denies bleeding problem or easy bruising/bleeding.  Skin: Denies rash or suspicious lesions.    Examination:    Vital Signs:    Vitals:    02/14/20 1030   BP: (!) 166/98   Pulse: 70   Weight: 108.9 kg (240 lb)   Height: 6' 4" (1.93 m)   PainSc: 0-No pain   PainLoc: Foot       Body mass index is 29.21 " kg/m².    This a well-developed, well nourished patient in no acute distress.    Alert and oriented x 3 and cooperative to examination.       Physical Exam:  Left foot-the patient cellulitis is resolving the wound has some granulation tissue there is no exposed tendon the toe was in extension there is no drainage a new dressing was applied    Imaging:  No x-rays       Assessment: There are no diagnoses linked to this encounter.    Plan:  Patient is being seen by wound care they ordered an MRI of his left toe to rule out osteomyelitis I do not think he has any active infection at this time his pin tracts are clean I applied his dressing he will follow up with wound care there apply Santyl ointment to the wound and cover it I will see him back in 2 weeks      DISCLAIMER: This note may have been dictated using voice recognition software and may contain grammatical errors.     NOTE: Consult report sent to referring provider via [employer] EMR.

## 2020-02-18 ENCOUNTER — OFFICE VISIT (OUTPATIENT)
Dept: WOUND CARE | Facility: HOSPITAL | Age: 80
End: 2020-02-18
Attending: PODIATRIST
Payer: MEDICARE

## 2020-02-18 VITALS
HEART RATE: 86 BPM | SYSTOLIC BLOOD PRESSURE: 128 MMHG | DIASTOLIC BLOOD PRESSURE: 78 MMHG | TEMPERATURE: 97 F | RESPIRATION RATE: 17 BRPM

## 2020-02-18 DIAGNOSIS — L97.525 NON-PRESSURE CHRONIC ULCER OF OTHER PART OF LEFT FOOT WITH MUSCLE INVOLVEMENT WITHOUT EVIDENCE OF NECROSIS: Primary | ICD-10-CM

## 2020-02-18 DIAGNOSIS — T81.31XA DEHISCENCE OF OPERATIVE WOUND, INITIAL ENCOUNTER: ICD-10-CM

## 2020-02-18 PROCEDURE — 11043 DBRDMT MUSC&/FSCA 1ST 20/<: CPT | Performed by: PODIATRIST

## 2020-02-18 PROCEDURE — 11043 DBRDMT MUSC&/FSCA 1ST 20/<: CPT | Mod: ,,, | Performed by: PODIATRIST

## 2020-02-18 PROCEDURE — 11043 PR DEBRIDEMENT, SKIN, SUB-Q TISSUE,MUSCLE,=<20 SQ CM: ICD-10-PCS | Mod: ,,, | Performed by: PODIATRIST

## 2020-02-18 NOTE — PROGRESS NOTES
[address] [facility] [address]  [place] [address]  Phone: [phone]   [phone]    Patient's PCP:Raphael Vargas MD  Referring Provider: No ref. provider found    Subjective:      Chief Complaint:: Wound Care (I'm here for the wound on my toe)    CHARLIE Hermosillo is a 79 y.o. male who presents with a complaint of a wound on his left great toe lasting for 8 weeks. Onset of the symptoms was open fracture of left great toe, which was surgically repaired by Dr. Martinez.  However, patient went on to develop a wound of the left great toe.  Current symptoms include pain and eschar.   Symptoms have increased. Treatment to date have included cleaning with normal saline and leaving it open to air, with no dressing.  He states this is what he was instructed to do.       He was referred to the wound Care Center by Dr. Martinez. He was placed on oral clindamycin at his last visit with Dr. Martinez on 2/7/20.     Today is the first time I am seeing the patient. I have reviewed his medical chart in detail.  I am concerned that he has underlying osteomyelitis of the left great toe.  I am able to palpate bone underlying the necrotic tissue on the top of his toe.  No drainage to culture.  Therefore, I will be ordering blood work and an MRI of his left foot. I discussed with him the treatment options if the MRI shows osteomyelitis of the toe.   I will include an A1c in his blood work as he has been told in the past that he is a borderline diabetic.     Until the results of the lab work and MRI are back, patient is to dress his toe consisting of Santyl, 4x4s, and gauze every day. He will also be receiving supplies I am ordering to change his dressings. Supplies will include Iodoflex and patient will begin dressing his toe using Iodoflex every other day, if he is not able to afford the santyl medication.      2/18:  Patient's wound has improved since last week.  Patient was approved for an MRI, and instructed to schedule with  [facility] imaging.  My office called him in order to schedule, but he had not returned the call.  Therefore, I had patient go directly to estimate imaging following this appointment in order to schedule his MRI.  Patient has been dressing with Iodosorb and dressing supplies.   He states he finished his oral antibiotics prescribed by Dr. Cruz this past Saturday. Patient to continue dressing with Iodosorb and dressing supplies.       Vitals:    02/18/20 1308   BP: 128/78   Pulse: 86   Resp: 17   Temp: 97.3 °F (36.3 °C)     Shoe Size:     Past Surgical History:   Procedure Laterality Date    APPENDECTOMY      INCISION AND DRAINAGE Left 12/18/2019    Procedure: INCISION AND DRAINAGE;  Surgeon: Nick Martinez MD;  Location: [facility] OR;  Service: Orthopedics;  Laterality: Left;    REPAIR OF EXTENSOR TENDON Left 12/18/2019    Procedure: REPAIR, TENDON, EXTENSOR;  Surgeon: Nick Martinez MD;  Location: [facility] OR;  Service: Orthopedics;  Laterality: Left;  PRACHI ANGEL     History reviewed. No pertinent past medical history.  Family History   Problem Relation Age of Onset    Diabetes Father         Social History:   Marital Status: Single  Alcohol History:  reports that he drank alcohol.  Tobacco History:  reports that he has never smoked. He has never used smokeless tobacco.  Drug History:  reports that he does not use drugs.    Review of patient's allergies indicates:  No Known Allergies    Current Outpatient Medications   Medication Sig Dispense Refill    aspirin (ECOTRIN) 81 MG EC tablet Take 1 tablet by mouth once daily.      CANNABIDIOL, CBD, EXTRACT ORAL Place 0.5 mLs under the tongue every 8 (eight) hours as needed. 5 mg/ml formula  0    cephALEXin (KEFLEX) 500 MG capsule TAKE 1 CAPSULE BY MOUTH 4 TIMES DAILY      clindamycin (CLEOCIN) 300 MG capsule Take 1 capsule (300 mg total) by mouth 3 (three) times daily. 30 capsule 0    lidocaine HCl/epinephrine (BUFFERED LIDOCAINE, LIDOCAINE-EPINEPHRINE,  SODIUM BICARB,) PLACE 0.5ML UNDER TONGUE EVERY 8 HOURS AS NEEDED FOR PAIN.      oxyCODONE-acetaminophen (PERCOCET)  mg per tablet TAKE 1 TABLET BY MOUTH EVERY 4 TO 6 HOURS AS NEEDED (MAX 3 TABLETS PER DAY)      SANTYL ointment Apply topically once daily. 30 g 3    triamcinolone acetonide 0.1% (KENALOG) 0.1 % cream 1 application to affected area       No current facility-administered medications for this visit.        Review of Systems      Objective:        Physical Exam:   Foot Exam  Physical Exam  Much of the documentation for this visit was completed in the Wound Docs system.  Please see the attached documentation for further details about the patient's care. Scanned under the Media tab.        Karis Rizvi DPM   Imaging:            Assessment:       1. 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    Procedures - Much of the documentation for this visit was completed in the Wound Docs system.  Please see the attached documentation for further details about the patient's care. Scanned under the Media tab.        Karis Rizvi DPM     Counseling:     I provided patient education verbally regarding:   Patient diagnosis, treatment options, as well as alternatives, risks, and benefits.     This note was created using Dragon voice recognition software that occasionally misinterpreted phrases or words.

## 2020-02-20 ENCOUNTER — HOSPITAL ENCOUNTER (OUTPATIENT)
Dept: RADIOLOGY | Facility: HOSPITAL | Age: 80
Discharge: HOME | End: 2020-02-20
Attending: PODIATRIST
Payer: MEDICARE

## 2020-02-20 DIAGNOSIS — T81.31XA DEHISCENCE OF OPERATIVE WOUND, INITIAL ENCOUNTER: ICD-10-CM

## 2020-02-20 DIAGNOSIS — L97.525 NON-PRESSURE CHRONIC ULCER OF OTHER PART OF LEFT FOOT WITH MUSCLE INVOLVEMENT WITHOUT EVIDENCE OF NECROSIS: ICD-10-CM

## 2020-02-20 DIAGNOSIS — M86.9 OSTEOMYELITIS, UNSPECIFIED SITE, UNSPECIFIED TYPE: Primary | ICD-10-CM

## 2020-02-20 PROCEDURE — 73720 MRI LWR EXTREMITY W/O&W/DYE: CPT | Mod: TC,PO,LT

## 2020-02-20 PROCEDURE — A9585 GADOBUTROL INJECTION: HCPCS | Mod: PO | Performed by: PODIATRIST

## 2020-02-20 PROCEDURE — 25500020 PHARM REV CODE 255: Mod: PO | Performed by: PODIATRIST

## 2020-02-20 RX ORDER — GADOBUTROL 604.72 MG/ML
10 INJECTION INTRAVENOUS
Status: COMPLETED | OUTPATIENT
Start: 2020-02-20 | End: 2020-02-20

## 2020-02-20 RX ADMIN — GADOBUTROL 10 ML: 604.72 INJECTION INTRAVENOUS at 10:02

## 2020-02-21 ENCOUNTER — OFFICE VISIT (OUTPATIENT)
Dept: PODIATRY | Facility: CLINIC | Age: 80
End: 2020-02-21
Payer: MEDICARE

## 2020-02-21 VITALS
HEART RATE: 51 BPM | DIASTOLIC BLOOD PRESSURE: 88 MMHG | BODY MASS INDEX: 28.46 KG/M2 | HEIGHT: 77 IN | SYSTOLIC BLOOD PRESSURE: 166 MMHG

## 2020-02-21 DIAGNOSIS — M86.172 OTHER ACUTE OSTEOMYELITIS OF LEFT FOOT: ICD-10-CM

## 2020-02-21 DIAGNOSIS — M86.9 OSTEOMYELITIS OF GREAT TOE: Primary | ICD-10-CM

## 2020-02-21 PROCEDURE — 1125F AMNT PAIN NOTED PAIN PRSNT: CPT | Mod: S$GLB,,, | Performed by: PODIATRIST

## 2020-02-21 PROCEDURE — 87118 MYCOBACTERIC IDENTIFICATION: CPT

## 2020-02-21 PROCEDURE — 1159F PR MEDICATION LIST DOCUMENTED IN MEDICAL RECORD: ICD-10-PCS | Mod: S$GLB,,, | Performed by: PODIATRIST

## 2020-02-21 PROCEDURE — 99213 OFFICE O/P EST LOW 20 MIN: CPT | Mod: 25,S$GLB,, | Performed by: PODIATRIST

## 2020-02-21 PROCEDURE — 1125F PR PAIN SEVERITY QUANTIFIED, PAIN PRESENT: ICD-10-PCS | Mod: S$GLB,,, | Performed by: PODIATRIST

## 2020-02-21 PROCEDURE — 87075 CULTR BACTERIA EXCEPT BLOOD: CPT

## 2020-02-21 PROCEDURE — 87116 MYCOBACTERIA CULTURE: CPT

## 2020-02-21 PROCEDURE — 87102 FUNGUS ISOLATION CULTURE: CPT

## 2020-02-21 PROCEDURE — 20220 BONE BIOPSY TROCAR/NDL SUPFC: CPT | Mod: S$GLB,,, | Performed by: PODIATRIST

## 2020-02-21 PROCEDURE — 87205 SMEAR GRAM STAIN: CPT

## 2020-02-21 PROCEDURE — 87206 SMEAR FLUORESCENT/ACID STAI: CPT

## 2020-02-21 PROCEDURE — 1101F PR PT FALLS ASSESS DOC 0-1 FALLS W/OUT INJ PAST YR: ICD-10-PCS | Mod: S$GLB,,, | Performed by: PODIATRIST

## 2020-02-21 PROCEDURE — 99213 PR OFFICE/OUTPT VISIT, EST, LEVL III, 20-29 MIN: ICD-10-PCS | Mod: 25,S$GLB,, | Performed by: PODIATRIST

## 2020-02-21 PROCEDURE — 87070 CULTURE OTHR SPECIMN AEROBIC: CPT

## 2020-02-21 PROCEDURE — 1101F PT FALLS ASSESS-DOCD LE1/YR: CPT | Mod: S$GLB,,, | Performed by: PODIATRIST

## 2020-02-21 PROCEDURE — 1159F MED LIST DOCD IN RCRD: CPT | Mod: S$GLB,,, | Performed by: PODIATRIST

## 2020-02-21 PROCEDURE — 20220 PR BONE BIOPSY,TROCAR/NEEDLE SUPERF: ICD-10-PCS | Mod: S$GLB,,, | Performed by: PODIATRIST

## 2020-02-21 NOTE — PROGRESS NOTES
[address] [facility] [address]  [place] [address]  Phone: [phone]   [phone]    Patient's PCP:Raphael Vargas MD  Referring Provider: No ref. provider found    Subjective:      Chief Complaint:: Procedure    HPI  Kartik Hermosillo is a 79 y.o. male who presents for a bone biopsy, osteomyelitis left great toe. Patients rates pain 1/10 on pain scale.  Patient is weight-bearing with crutches and surgery shoe.  Dressing changes with Iodosorb.    Borderline diabetic  [facility], no other doctor monitors/watches it  Date Last Seen: a couple of weeks ago  Blood Sugar: does not check blood sugar because is keeping track or diet and exercise.  Hemoglobin A1c: 5.9 (02/11/20)    Vitals:    02/21/20 1012   BP: (!) 166/88   Pulse: (!) 51     Shoe Size: 14    Past Surgical History:   Procedure Laterality Date    APPENDECTOMY      INCISION AND DRAINAGE Left 12/18/2019    Procedure: INCISION AND DRAINAGE;  Surgeon: Nick Martinez MD;  Location: [facility] OR;  Service: Orthopedics;  Laterality: Left;    REPAIR OF EXTENSOR TENDON Left 12/18/2019    Procedure: REPAIR, TENDON, EXTENSOR;  Surgeon: Nick Martinez MD;  Location: [facility] OR;  Service: Orthopedics;  Laterality: Left;  PRACHI ANGEL     History reviewed. No pertinent past medical history.  Family History   Problem Relation Age of Onset    Diabetes Father         Social History:   Marital Status: Single  Alcohol History:  reports that he drank alcohol.  Tobacco History:  reports that he has never smoked. He has never used smokeless tobacco.  Drug History:  reports that he does not use drugs.    Review of patient's allergies indicates:  No Known Allergies    Current Outpatient Medications   Medication Sig Dispense Refill    aspirin (ECOTRIN) 81 MG EC tablet Take 1 tablet by mouth once daily.      SANTYL ointment Apply topically once daily. (Patient not taking: Reported on 2/21/2020) 30 g 3    triamcinolone acetonide 0.1% (KENALOG)  0.1 % cream 1 application to affected area       No current facility-administered medications for this visit.        Review of Systems      Objective:        Physical Exam:   Foot Exam  Physical Exam   Musculoskeletal:        Feet:        Imaging: MRI Foot (Forefoot) Left W W/O Contrast  Narrative: EXAMINATION:  MRI FOOT (FOREFOOT) LEFT W W/O CONTRAST    CLINICAL HISTORY:  Osteomyelitis suspected, foot swelling, diabetic; Disruption of external operation (surgical) wound, not elsewhere classified, initial encounter patient with K-wire fixation of left great toe IP joint and prior dorsal left great toe distal phalangeal base avulsion fracture.    TECHNIQUE:  Left foot, with attention to forefoot and toes, MRI without and with 10 mL Gadavist    COMPARISON:  Left foot radiographs 01/21/2020 dating back to 12/18/2019    FINDINGS:  Previous K-wires traversing through the left great toe distal and proximal phalanges have been removed, with tracks of such remaining evident.    Confluent low T1 bone marrow signal alteration involves left 1st proximal phalangeal head and distal phalangeal base, with multifocal areas of cortical loss, indicating osteomyelitis.    Elsewhere, degenerative subcortical bone marrow signal alteration involves left 1st metatarsal head, all remaining bone marrow signal throughout 2nd through 5th toes and visualized left forefoot is otherwise normal.  Physiologic amount of fluid lies within the visualized joints.  Visualized extensor and flexor tendons are intact.    Following IV contrast, diffuse bone marrow enhancement along margins of left great toe IP joint and surrounding soft tissues occur.  No rim enhancing fluid collection is present to suggest abscess.  Impression: 1. Osteomyelitis of left great toe proximal phalangeal head.  2. Confluent low T1 bone marrow signal alteration affecting left great toe distal phalangeal base also suspicious for osteomyelitis given extent.  Bone marrow signal  alteration related to healed fracture is felt less likely.  3. Negative for soft tissue abscess.    Electronically signed by: Chong Hough MD  Date:    02/20/2020  Time:    11:58               Assessment:       1. Osteomyelitis of great toe - Left Foot    2. Other acute osteomyelitis of left foot - Left Foot      Plan:   Osteomyelitis of great toe - Left Foot  -     Aerobic culture  -     Culture, Anaerobic  -     AFB Culture & Smear  -     Fungus culture    Other acute osteomyelitis of left foot - Left Foot  -     Aerobic culture  -     Culture, Anaerobic  -     AFB Culture & Smear  -     Fungus culture      Follow up Patient will follow up with Dr. Karis Rizvi in the Wound Care Center.    MRI images and findings show reviewed in detail with the patient.     Procedures - Written informed consent was obtained.  The site was marked and prepped in the usual aseptic manner.  A time-out was called.  Utilizing a jamshidi needle a biopsy of bone from the great toe was obtained.  This was then passed from the field and sent to pathology for culture.  The toe was redressed with Iodosorb.  Patient tolerated the procedure well.    Patient is to continue home dressing changes with Iodosorb.  Weight-bearing in postoperative shoe.    Counseling:     I provided patient education verbally regarding:   Patient diagnosis, treatment options, as well as alternatives, risks, and benefits.     This note was created using Dragon voice recognition software that occasionally misinterpreted phrases or words.

## 2020-02-24 ENCOUNTER — OFFICE VISIT (OUTPATIENT)
Dept: WOUND CARE | Facility: HOSPITAL | Age: 80
End: 2020-02-24
Attending: PODIATRIST
Payer: MEDICARE

## 2020-02-24 VITALS
RESPIRATION RATE: 16 BRPM | TEMPERATURE: 98 F | SYSTOLIC BLOOD PRESSURE: 130 MMHG | DIASTOLIC BLOOD PRESSURE: 78 MMHG | HEART RATE: 86 BPM

## 2020-02-24 DIAGNOSIS — M86.60 CHRONIC OSTEOMYELITIS: Primary | ICD-10-CM

## 2020-02-24 DIAGNOSIS — L97.525 NON-PRESSURE CHRONIC ULCER OF OTHER PART OF LEFT FOOT WITH MUSCLE INVOLVEMENT WITHOUT EVIDENCE OF NECROSIS: ICD-10-CM

## 2020-02-24 LAB — BACTERIA SPEC ANAEROBE CULT: NORMAL

## 2020-02-24 PROCEDURE — 11042 DBRDMT SUBQ TIS 1ST 20SQCM/<: CPT | Performed by: PODIATRIST

## 2020-02-24 PROCEDURE — 11042 PR DEBRIDEMENT, SKIN, SUB-Q TISSUE,=<20 SQ CM: ICD-10-PCS | Mod: ,,, | Performed by: PODIATRIST

## 2020-02-24 PROCEDURE — 11042 DBRDMT SUBQ TIS 1ST 20SQCM/<: CPT | Mod: ,,, | Performed by: PODIATRIST

## 2020-02-24 NOTE — PROGRESS NOTES
[address] [facility] [address]  [place] [address]  Phone: [phone]   [phone]    Patient's PCP:Raphael Vargas MD  Referring Provider: No ref. provider found    Subjective:      Chief Complaint:: Wound Care (I'm here for the wound on my toe)    CHARLIE Hermosillo is a 79 y.o. male who presents with a complaint of a wound on his left great toe lasting for 8 weeks. Onset of the symptoms was open fracture of left great toe, which was surgically repaired by Dr. Martinez.  However, patient went on to develop a wound of the left great toe.  Current symptoms include pain and eschar.   Symptoms have increased. Treatment to date have included cleaning with normal saline and leaving it open to air, with no dressing.  He states this is what he was instructed to do.       He was referred to the wound Care Center by Dr. Martinez. He was placed on oral clindamycin at his last visit with Dr. Martinez on 2/7/20.     Today is the first time I am seeing the patient. I have reviewed his medical chart in detail.  I am concerned that he has underlying osteomyelitis of the left great toe.  I am able to palpate bone underlying the necrotic tissue on the top of his toe.  No drainage to culture.  Therefore, I will be ordering blood work and an MRI of his left foot. I discussed with him the treatment options if the MRI shows osteomyelitis of the toe.   I will include an A1c in his blood work as he has been told in the past that he is a borderline diabetic.     Until the results of the lab work and MRI are back, patient is to dress his toe consisting of Santyl, 4x4s, and gauze every day. He will also be receiving supplies I am ordering to change his dressings. Supplies will include Iodoflex and patient will begin dressing his toe using Iodoflex every other day, if he is not able to afford the santyl medication.        2/18:  Patient's wound has improved since last week.  Patient was approved for an MRI, and instructed to schedule  with [facility] imaging.  My office called him in order to schedule, but he had not returned the call.  Therefore, I had patient go directly to estimate imaging following this appointment in order to schedule his MRI.  Patient has been dressing with Iodosorb and dressing supplies.   He states he finished his oral antibiotics prescribed by Dr. Cruz this past Saturday. Patient to continue dressing with Iodosorb and dressing supplies.      2/24:  MRI came back showing osteomyelitis   1. Osteomyelitis of left great toe proximal phalangeal head.  2. Confluent low T1 bone marrow signal alteration affecting left great toe distal phalangeal base also suspicious for osteomyelitis given extent.  Bone marrow signal alteration related to healed fracture is felt less likely.  3. Negative for soft tissue abscess.  Patient referred to Infectious Disease for IV antibiotics.  Discussed patient with Dr. Field.  Bone biopsy taken by Dr. Gunner Rizvi on Friday, February 21st.  I was not able to do bone biopsy, due to being in wound clinic at the time.    Awaiting bone biopsy results.  Patient to continue Iodosorb dressing changes and follow up in 1 week. Debrided today.  Wound has improved.  Bone is still palpable.    Vitals:    02/24/20 1047   BP: 130/78   Pulse: 86   Resp: 16   Temp: 98.3 °F (36.8 °C)     Shoe Size:     Past Surgical History:   Procedure Laterality Date    APPENDECTOMY      INCISION AND DRAINAGE Left 12/18/2019    Procedure: INCISION AND DRAINAGE;  Surgeon: Nick Martinez MD;  Location: [facility] OR;  Service: Orthopedics;  Laterality: Left;    REPAIR OF EXTENSOR TENDON Left 12/18/2019    Procedure: REPAIR, TENDON, EXTENSOR;  Surgeon: Nick Martinez MD;  Location: [facility] OR;  Service: Orthopedics;  Laterality: Left;  PRACHI ANGEL     History reviewed. No pertinent past medical history.  Family History   Problem Relation Age of Onset    Diabetes Father         Social History:   Marital Status:  Single  Alcohol History:  reports that he drank alcohol.  Tobacco History:  reports that he has never smoked. He has never used smokeless tobacco.  Drug History:  reports that he does not use drugs.    Review of patient's allergies indicates:  No Known Allergies    Current Outpatient Medications   Medication Sig Dispense Refill    aspirin (ECOTRIN) 81 MG EC tablet Take 1 tablet by mouth once daily.      SANTYL ointment Apply topically once daily. (Patient not taking: Reported on 2/21/2020) 30 g 3    triamcinolone acetonide 0.1% (KENALOG) 0.1 % cream 1 application to affected area       No current facility-administered medications for this visit.        Review of Systems      Objective:        Physical Exam:   Foot Exam  Physical Exam  Much of the documentation for this visit was completed in the Wound Docs system.  Please see the attached documentation for further details about the patient's care. Scanned under the Media tab.        Karis Rizvi DPM   Imaging:            Assessment:       1. Chronic osteomyelitis    2. 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    Procedures - Much of the documentation for this visit was completed in the Wound Docs system.  Please see the attached documentation for further details about the patient's care. Scanned under the Media tab.        Karis Rizvi DPM     Counseling:     I provided patient education verbally regarding:   Patient diagnosis, treatment options, as well as alternatives, risks, and benefits.     This note was created using Dragon voice recognition software that occasionally misinterpreted phrases or words.

## 2020-02-25 LAB
BACTERIA THROAT CULT: NO GROWTH
GRAM STN SPEC: NORMAL
GRAM STN SPEC: NORMAL

## 2020-02-26 ENCOUNTER — OFFICE VISIT (OUTPATIENT)
Dept: INFECTIOUS DISEASES | Facility: CLINIC | Age: 80
End: 2020-02-26
Payer: MEDICARE

## 2020-02-26 VITALS
SYSTOLIC BLOOD PRESSURE: 143 MMHG | DIASTOLIC BLOOD PRESSURE: 86 MMHG | OXYGEN SATURATION: 97 % | WEIGHT: 238 LBS | HEART RATE: 54 BPM | BODY MASS INDEX: 28.22 KG/M2

## 2020-02-26 DIAGNOSIS — T14.8XXA FRACTURE: ICD-10-CM

## 2020-02-26 DIAGNOSIS — M86.272 SUBACUTE OSTEOMYELITIS OF LEFT FOOT: Primary | ICD-10-CM

## 2020-02-26 DIAGNOSIS — S96.909S: ICD-10-CM

## 2020-02-26 DIAGNOSIS — S91.109A: ICD-10-CM

## 2020-02-26 DIAGNOSIS — S91.109S: ICD-10-CM

## 2020-02-26 DIAGNOSIS — S96.909A: ICD-10-CM

## 2020-02-26 PROBLEM — M86.9 OSTEOMYELITIS OF LEFT FOOT: Status: ACTIVE | Noted: 2020-02-26

## 2020-02-26 PROCEDURE — 99205 OFFICE O/P NEW HI 60 MIN: CPT | Mod: S$GLB,,, | Performed by: INTERNAL MEDICINE

## 2020-02-26 PROCEDURE — 1126F PR PAIN SEVERITY QUANTIFIED, NO PAIN PRESENT: ICD-10-PCS | Mod: S$GLB,,, | Performed by: INTERNAL MEDICINE

## 2020-02-26 PROCEDURE — 1101F PR PT FALLS ASSESS DOC 0-1 FALLS W/OUT INJ PAST YR: ICD-10-PCS | Mod: S$GLB,,, | Performed by: INTERNAL MEDICINE

## 2020-02-26 PROCEDURE — 99205 PR OFFICE/OUTPT VISIT, NEW, LEVL V, 60-74 MIN: ICD-10-PCS | Mod: S$GLB,,, | Performed by: INTERNAL MEDICINE

## 2020-02-26 PROCEDURE — 1101F PT FALLS ASSESS-DOCD LE1/YR: CPT | Mod: S$GLB,,, | Performed by: INTERNAL MEDICINE

## 2020-02-26 PROCEDURE — 1126F AMNT PAIN NOTED NONE PRSNT: CPT | Mod: S$GLB,,, | Performed by: INTERNAL MEDICINE

## 2020-02-26 PROCEDURE — 1159F PR MEDICATION LIST DOCUMENTED IN MEDICAL RECORD: ICD-10-PCS | Mod: S$GLB,,, | Performed by: INTERNAL MEDICINE

## 2020-02-26 PROCEDURE — 1159F MED LIST DOCD IN RCRD: CPT | Mod: S$GLB,,, | Performed by: INTERNAL MEDICINE

## 2020-02-26 RX ORDER — SODIUM CHLORIDE 0.9 % (FLUSH) 0.9 %
10 SYRINGE (ML) INJECTION
Status: CANCELLED | OUTPATIENT
Start: 2020-02-26

## 2020-02-26 RX ORDER — DOXYCYCLINE 100 MG/1
100 CAPSULE ORAL EVERY 12 HOURS
Qty: 60 CAPSULE | Refills: 2 | Status: SHIPPED | OUTPATIENT
Start: 2020-02-26 | End: 2020-04-20 | Stop reason: SDUPTHER

## 2020-02-26 RX ORDER — HEPARIN 100 UNIT/ML
500 SYRINGE INTRAVENOUS
Status: CANCELLED | OUTPATIENT
Start: 2020-02-26

## 2020-02-26 RX ORDER — CEFTRIAXONE 500 MG/1
2 INJECTION, POWDER, FOR SOLUTION INTRAMUSCULAR; INTRAVENOUS DAILY
Qty: 112 G | Refills: 0 | Status: SHIPPED | OUTPATIENT
Start: 2020-02-26 | End: 2020-02-28

## 2020-02-26 NOTE — LETTER
February 26, 2020      Karis Rizvi DPM  [address] [facility]  [address]  [place] [address]           [facility] - Infectious Diesease  [address]  [place] [address]  Phone: [phone]  Fax: [phone]          Patient: Kartik Hermosillo   MR Number: [id number]   YOB: 1940   Date of Visit: 2/26/2020       Dear Dr. Karis Rizvi:    Thank you for referring Kartik Hermosillo to me for evaluation. Attached you will find relevant portions of my assessment and plan of care.    If you have questions, please do not hesitate to call me. I look forward to following Kartik Hermosillo along with you.    Sincerely,    Zuly Alfredo MD    Enclosure  CC:  No Recipients    If you would like to receive this communication electronically, please contact [email or web address] or [phone] to request more information on [employer] Link access.    For providers and/or their staff who would like to refer a patient to [facility], please contact us through our one-stop-shop provider referral line, [facility], at [phone].    If you feel you have received this communication in error or would no longer like to receive these types of communications, please e-mail [email or web address]
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
## 2020-02-28 ENCOUNTER — INFUSION (OUTPATIENT)
Dept: INFUSION THERAPY | Facility: HOSPITAL | Age: 80
End: 2020-02-28
Attending: INTERNAL MEDICINE
Payer: MEDICARE

## 2020-02-28 ENCOUNTER — OFFICE VISIT (OUTPATIENT)
Dept: ORTHOPEDICS | Facility: CLINIC | Age: 80
End: 2020-02-28
Payer: MEDICARE

## 2020-02-28 ENCOUNTER — HOSPITAL ENCOUNTER (OUTPATIENT)
Dept: RADIOLOGY | Facility: HOSPITAL | Age: 80
Discharge: HOME | End: 2020-02-28
Attending: INTERNAL MEDICINE
Payer: MEDICARE

## 2020-02-28 VITALS
TEMPERATURE: 98 F | HEART RATE: 84 BPM | DIASTOLIC BLOOD PRESSURE: 99 MMHG | RESPIRATION RATE: 20 BRPM | SYSTOLIC BLOOD PRESSURE: 151 MMHG

## 2020-02-28 DIAGNOSIS — S92.422D CLOSED DISPLACED FRACTURE OF DISTAL PHALANX OF LEFT GREAT TOE WITH ROUTINE HEALING, SUBSEQUENT ENCOUNTER: Primary | ICD-10-CM

## 2020-02-28 DIAGNOSIS — M86.9 OSTEOMYELITIS, UNSPECIFIED SITE, UNSPECIFIED TYPE: ICD-10-CM

## 2020-02-28 DIAGNOSIS — M86.9 OSTEOMYELITIS, UNSPECIFIED SITE, UNSPECIFIED TYPE: Primary | ICD-10-CM

## 2020-02-28 LAB
25(OH)D3+25(OH)D2 SERPL-MCNC: 22 NG/ML (ref 30–96)
ALBUMIN SERPL BCP-MCNC: 4 G/DL (ref 3.5–5.2)
ALP SERPL-CCNC: 60 U/L (ref 55–135)
ALT SERPL W/O P-5'-P-CCNC: 21 U/L (ref 10–44)
ANION GAP SERPL CALC-SCNC: 5 MMOL/L (ref 8–16)
AST SERPL-CCNC: 21 U/L (ref 10–40)
BASOPHILS # BLD AUTO: 0.04 K/UL (ref 0–0.2)
BASOPHILS NFR BLD: 1.1 % (ref 0–1.9)
BILIRUB SERPL-MCNC: 1 MG/DL (ref 0.1–1)
BUN SERPL-MCNC: 12 MG/DL (ref 8–23)
CALCIUM SERPL-MCNC: 8.9 MG/DL (ref 8.7–10.5)
CHLORIDE SERPL-SCNC: 107 MMOL/L (ref 95–110)
CO2 SERPL-SCNC: 27 MMOL/L (ref 23–29)
CREAT SERPL-MCNC: 0.8 MG/DL (ref 0.5–1.4)
CRP SERPL-MCNC: <0.02 MG/DL (ref 0–0.75)
DIFFERENTIAL METHOD: ABNORMAL
EOSINOPHIL # BLD AUTO: 0.1 K/UL (ref 0–0.5)
EOSINOPHIL NFR BLD: 3.2 % (ref 0–8)
ERYTHROCYTE [DISTWIDTH] IN BLOOD BY AUTOMATED COUNT: 13 % (ref 11.5–14.5)
ERYTHROCYTE [SEDIMENTATION RATE] IN BLOOD BY WESTERGREN METHOD: 5 MM/HR (ref 0–10)
EST. GFR  (AFRICAN AMERICAN): >60 ML/MIN/1.73 M^2
EST. GFR  (NON AFRICAN AMERICAN): >60 ML/MIN/1.73 M^2
GLUCOSE SERPL-MCNC: 146 MG/DL (ref 70–110)
HCT VFR BLD AUTO: 41 % (ref 40–54)
HGB BLD-MCNC: 13.9 G/DL (ref 14–18)
IMM GRANULOCYTES # BLD AUTO: 0.01 K/UL (ref 0–0.04)
IMM GRANULOCYTES NFR BLD AUTO: 0.3 % (ref 0–0.5)
LYMPHOCYTES # BLD AUTO: 1 K/UL (ref 1–4.8)
LYMPHOCYTES NFR BLD: 26.1 % (ref 18–48)
MCH RBC QN AUTO: 30.5 PG (ref 27–31)
MCHC RBC AUTO-ENTMCNC: 33.9 G/DL (ref 32–36)
MCV RBC AUTO: 90 FL (ref 82–98)
MONOCYTES # BLD AUTO: 0.4 K/UL (ref 0.3–1)
MONOCYTES NFR BLD: 10 % (ref 4–15)
NEUTROPHILS # BLD AUTO: 2.3 K/UL (ref 1.8–7.7)
NEUTROPHILS NFR BLD: 59.3 % (ref 38–73)
NRBC BLD-RTO: 0 /100 WBC
PLATELET # BLD AUTO: 131 K/UL (ref 150–350)
PMV BLD AUTO: 11.2 FL (ref 9.2–12.9)
POTASSIUM SERPL-SCNC: 4 MMOL/L (ref 3.5–5.1)
PROT SERPL-MCNC: 6.3 G/DL (ref 6–8.4)
RBC # BLD AUTO: 4.55 M/UL (ref 4.6–6.2)
SODIUM SERPL-SCNC: 139 MMOL/L (ref 136–145)
VIT B12 SERPL-MCNC: 414 PG/ML (ref 210–950)
WBC # BLD AUTO: 3.79 K/UL (ref 3.9–12.7)

## 2020-02-28 PROCEDURE — 80053 COMPREHEN METABOLIC PANEL: CPT

## 2020-02-28 PROCEDURE — 71045 X-RAY EXAM CHEST 1 VIEW: CPT | Mod: TC,59

## 2020-02-28 PROCEDURE — 99024 POSTOP FOLLOW-UP VISIT: CPT | Mod: S$GLB,,, | Performed by: ORTHOPAEDIC SURGERY

## 2020-02-28 PROCEDURE — 99999 PR PBB SHADOW E&M-EST. PATIENT-LVL II: ICD-10-PCS | Mod: PBBFAC,,, | Performed by: ORTHOPAEDIC SURGERY

## 2020-02-28 PROCEDURE — 99999 PR PBB SHADOW E&M-EST. PATIENT-LVL II: CPT | Mod: PBBFAC,,, | Performed by: ORTHOPAEDIC SURGERY

## 2020-02-28 PROCEDURE — 99024 PR POST-OP FOLLOW-UP VISIT: ICD-10-PCS | Mod: S$GLB,,, | Performed by: ORTHOPAEDIC SURGERY

## 2020-02-28 PROCEDURE — 63600175 PHARM REV CODE 636 W HCPCS: Performed by: INTERNAL MEDICINE

## 2020-02-28 PROCEDURE — 85025 COMPLETE CBC W/AUTO DIFF WBC: CPT

## 2020-02-28 PROCEDURE — 96365 THER/PROPH/DIAG IV INF INIT: CPT | Mod: 59

## 2020-02-28 PROCEDURE — 85651 RBC SED RATE NONAUTOMATED: CPT

## 2020-02-28 PROCEDURE — 86140 C-REACTIVE PROTEIN: CPT

## 2020-02-28 PROCEDURE — 82607 VITAMIN B-12: CPT

## 2020-02-28 PROCEDURE — 36569 INSJ PICC 5 YR+ W/O IMAGING: CPT

## 2020-02-28 PROCEDURE — 82306 VITAMIN D 25 HYDROXY: CPT

## 2020-02-28 RX ADMIN — CEFTRIAXONE 2 G: 2 INJECTION, SOLUTION INTRAVENOUS at 10:02

## 2020-02-28 NOTE — PROGRESS NOTES
2/28/2020    History reviewed. No pertinent past medical history.    Past Surgical History:   Procedure Laterality Date    APPENDECTOMY      INCISION AND DRAINAGE Left 12/18/2019    Procedure: INCISION AND DRAINAGE;  Surgeon: Nick Martinez MD;  Location: [facility] OR;  Service: Orthopedics;  Laterality: Left;    REPAIR OF EXTENSOR TENDON Left 12/18/2019    Procedure: REPAIR, TENDON, EXTENSOR;  Surgeon: Nick Martinez MD;  Location: [facility] OR;  Service: Orthopedics;  Laterality: Left;  PRACHI ANGEL       Current Outpatient Medications   Medication Sig    aspirin (ECOTRIN) 81 MG EC tablet Take 1 tablet by mouth once daily.    doxycycline (VIBRAMYCIN) 100 MG Cap Take 1 capsule (100 mg total) by mouth every 12 (twelve) hours.    SANTYL ointment Apply topically once daily.    triamcinolone acetonide 0.1% (KENALOG) 0.1 % cream 1 application to affected area     No current facility-administered medications for this visit.      Facility-Administered Medications Ordered in Other Visits   Medication    heparin, porcine (PF) 100 unit/mL injection flush 500 Units    sodium chloride 0.9% flush 10 mL       Review of patient's allergies indicates:  No Known Allergies    Family History   Problem Relation Age of Onset    Diabetes Father        Social History     Socioeconomic History    Marital status: Single     Spouse name: Not on file    Number of children: Not on file    Years of education: Not on file    Highest education level: Not on file   Occupational History    Not on file   Social Needs    Financial resource strain: Not on file    Food insecurity:     Worry: Not on file     Inability: Not on file    Transportation needs:     Medical: Not on file     Non-medical: Not on file   Tobacco Use    Smoking status: Never Smoker    Smokeless tobacco: Never Used   Substance and Sexual Activity    Alcohol use: Not Currently    Drug use: Never    Sexual activity: Not on file   Lifestyle     Physical activity:     Days per week: Not on file     Minutes per session: Not on file    Stress: Not on file   Relationships    Social connections:     Talks on phone: Not on file     Gets together: Not on file     Attends [employer] service: Not on file     Active member of club or organization: Not on file     Attends meetings of clubs or organizations: Not on file     Relationship status: Not on file   Other Topics Concern    Not on file   Social History Narrative    Not on file       Chief Complaint:   Chief Complaint   Patient presents with    Left Foot - Pain, Post-op Evaluation     DOS: 12/18/2019 --  10 wks & 2 dys S/P OPEN AVULSION FX DISTAL PHALANX OF DIP JOINT LEFT GREAT TOE. Last Wound Care Clinic visit on 02/24. MRI review of left great toe. Pt had [facility] today  With abx infusion         History of present illness:    This is a 79 y.o. year old male who complains of patient is now 10 weeks status post open avulsion fracture of the distal phalanx and IP joint of the left great toe patient had a recent MRI which shows evidence of some osteomyelitis of the great toe involving the distal portion of the proximal phalanx the patient was placed in the PICC line receiving IV antibiotics for possible osteomyelitis    Review of Systems:    Constitution: Denies chills, fever, and sweats.  HENT: Denies headaches or blurry vision.  Cardiovascular: Denies chest pain or irregular heart beat.  Respiratory: Denies cough or shortness of breath.  Gastrointestinal: Denies abdominal pain, nausea, or vomiting.  Musculoskeletal:  Denies muscle cramps.  Neurological: Denies dizziness or focal weakness.  Psychiatric/Behavioral: Normal mental status.  Hematologic/Lymphatic: Denies bleeding problem or easy bruising/bleeding.  Skin: Denies rash or suspicious lesions.    Examination:    Vital Signs:  There were no vitals filed for this visit.    There is no height or weight on file to calculate BMI.    This a  well-developed, well nourished patient in no acute distress.    Alert and oriented x 3 and cooperative to examination.       Physical Exam:  Left foot-the redness and swelling the toe appears to be subsiding the wound has good granulation bed there is no drainage appears that there is no exposed bone    Imaging:  Patient had a recent MRI which showed evidence of possible osteomyelitis of the distal portion of the proximal phalanx       Assessment: Closed displaced fracture of distal phalanx of left great toe with routine healing, subsequent encounter        Plan:  Patient is presently receiving IV antibiotics for osteomyelitis of the left great toe clinically toe looks like it is improving I no see any evidence of any growth yet from the cultures of the bone biopsy she is following up with the podiatrist I will see him back here in about 3 weeks we applied a dressing to the great toe      DISCLAIMER: This note may have been dictated using voice recognition software and may contain grammatical errors.     NOTE: Consult report sent to referring provider via [employer] EMR.

## 2020-03-02 ENCOUNTER — TELEPHONE (OUTPATIENT)
Dept: INFECTIOUS DISEASES | Facility: CLINIC | Age: 80
End: 2020-03-02

## 2020-03-02 NOTE — TELEPHONE ENCOUNTER
Patient no longer wants to do home infusions and would prefer to go to outpatient facility to have them done.    Deep Anderson

## 2020-03-03 ENCOUNTER — OFFICE VISIT (OUTPATIENT)
Dept: WOUND CARE | Facility: HOSPITAL | Age: 80
End: 2020-03-03
Attending: PODIATRIST
Payer: MEDICARE

## 2020-03-03 DIAGNOSIS — L97.525 NON-PRESSURE CHRONIC ULCER OF OTHER PART OF LEFT FOOT WITH MUSCLE INVOLVEMENT WITHOUT EVIDENCE OF NECROSIS: Primary | ICD-10-CM

## 2020-03-03 PROCEDURE — 11042 PR DEBRIDEMENT, SKIN, SUB-Q TISSUE,=<20 SQ CM: ICD-10-PCS | Mod: ,,, | Performed by: PODIATRIST

## 2020-03-03 PROCEDURE — 11042 DBRDMT SUBQ TIS 1ST 20SQCM/<: CPT | Performed by: PODIATRIST

## 2020-03-03 PROCEDURE — 11042 DBRDMT SUBQ TIS 1ST 20SQCM/<: CPT | Mod: ,,, | Performed by: PODIATRIST

## 2020-03-03 RX ORDER — HEPARIN SODIUM (PORCINE) LOCK FLUSH IV SOLN 100 UNIT/ML 100 UNIT/ML
300 SOLUTION INTRAVENOUS
Status: CANCELLED | OUTPATIENT
Start: 2020-03-06

## 2020-03-03 RX ORDER — SODIUM CHLORIDE 0.9 % (FLUSH) 0.9 %
10 SYRINGE (ML) INJECTION
Status: CANCELLED | OUTPATIENT
Start: 2020-03-06

## 2020-03-03 NOTE — PROGRESS NOTES
[address]  [place] [address]  Phone: [phone]   [phone]    Patient's PCP:Raphael Vargas MD  Referring Provider: No ref. provider found    Subjective:      Chief Complaint:: Wound Care (my toe)    HPI  Kartik Hermosillo is a 79 y.o. male who presents with a complaint of a wound on his left great toe lasting for 8 weeks. Onset of the symptoms was open fracture of left great toe, which was surgically repaired by Dr. Martinez.  However, patient went on to develop a wound of the left great toe.  Current symptoms include pain and eschar.   Symptoms have increased. Treatment to date have included cleaning with normal saline and leaving it open to air, with no dressing.  He states this is what he was instructed to do.       He was referred to the wound Care Center by Dr. Martinez. He was placed on oral clindamycin at his last visit with Dr. Martinez on 2/7/20.     Today is the first time I am seeing the patient. I have reviewed his medical chart in detail.  I am concerned that he has underlying osteomyelitis of the left great toe.  I am able to palpate bone underlying the necrotic tissue on the top of his toe.  No drainage to culture.  Therefore, I will be ordering blood work and an MRI of his left foot. I discussed with him the treatment options if the MRI shows osteomyelitis of the toe.   I will include an A1c in his blood work as he has been told in the past that he is a borderline diabetic.     Until the results of the lab work and MRI are back, patient is to dress his toe consisting of Santyl, 4x4s, and gauze every day. He will also be receiving supplies I am ordering to change his dressings. Supplies will include Iodoflex and patient will begin dressing his toe using Iodoflex every other day, if he is not able to afford the santyl medication.        2/18:  Patient's wound has improved since last week.  Patient was approved for an MRI, and instructed to schedule with [facility] imaging.  My office  called him in order to schedule, but he had not returned the call.  Therefore, I had patient go directly to estimate imaging following this appointment in order to schedule his MRI.  Patient has been dressing with Iodosorb and dressing supplies.   He states he finished his oral antibiotics prescribed by Dr. Cruz this past Saturday. Patient to continue dressing with Iodosorb and dressing supplies.      2/24:  MRI came back showing osteomyelitis   1. Osteomyelitis of left great toe proximal phalangeal head.  2. Confluent low T1 bone marrow signal alteration affecting left great toe distal phalangeal base also suspicious for osteomyelitis given extent.  Bone marrow signal alteration related to healed fracture is felt less likely.  3. Negative for soft tissue abscess.  Patient referred to Infectious Disease for IV antibiotics.  Discussed patient with Dr. Field.  Bone biopsy taken by Dr. Gunner Rizvi on Friday, February 21st.  I was not able to do bone biopsy, due to being in wound clinic at the time.    Awaiting bone biopsy results.  Patient to continue Iodosorb dressing changes and follow up in 1 week. Debrided today.  Wound has improved.  Bone is still palpable.    3/3:  Patient currently receiving IV antibiotics in addition to oral antibiotics from Infectious Disease. Wound is improving.  Continue with Iodosorb.  Debrided today.    There were no vitals filed for this visit.  Shoe Size:   .  Past Surgical History:   Procedure Laterality Date    APPENDECTOMY      INCISION AND DRAINAGE Left 12/18/2019    Procedure: INCISION AND DRAINAGE;  Surgeon: Nick Martinez MD;  Location: [facility] OR;  Service: Orthopedics;  Laterality: Left;    REPAIR OF EXTENSOR TENDON Left 12/18/2019    Procedure: REPAIR, TENDON, EXTENSOR;  Surgeon: Nick Martinez MD;  Location: [facility] OR;  Service: Orthopedics;  Laterality: Left;  PRACHI ANGEL     History reviewed. No pertinent past medical history.  Family History    Problem Relation Age of Onset    Diabetes Father         Social History:   Marital Status: Single  Alcohol History:  reports that he drank alcohol.  Tobacco History:  reports that he has never smoked. He has never used smokeless tobacco.  Drug History:  reports that he does not use drugs.    Review of patient's allergies indicates:  No Known Allergies    Current Outpatient Medications   Medication Sig Dispense Refill    aspirin (ECOTRIN) 81 MG EC tablet Take 1 tablet by mouth once daily.      doxycycline (VIBRAMYCIN) 100 MG Cap Take 1 capsule (100 mg total) by mouth every 12 (twelve) hours. 60 capsule 2    SANTYL ointment Apply topically once daily. 30 g 3    triamcinolone acetonide 0.1% (KENALOG) 0.1 % cream 1 application to affected area       No current facility-administered medications for this visit.      Facility-Administered Medications Ordered in Other Visits   Medication Dose Route Frequency Provider Last Rate Last Dose    heparin, porcine (PF) 100 unit/mL injection flush 500 Units  500 Units Intravenous 1 time in Clinic/MIKI Alfredo MD        sodium chloride 0.9% flush 10 mL  10 mL Intravenous 1 time in Clinic/MIKI Alfredo MD           Review of Systems      Objective:        Physical Exam:   Foot Exam  Physical Exam  Much of the documentation for this visit was completed in the Wound Docs system.  Please see the attached documentation for further details about the patient's care. Scanned under the Media tab.        Karis Rizvi DPM   Imaging:            Assessment:       1. 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    Procedures - Much of the documentation for this visit was completed in the Wound Docs system.  Please see the attached  documentation for further details about the patient's care. Scanned under the Media tab.        Karis Rizvi DPM     Counseling:     I provided patient education verbally regarding:   Patient diagnosis, treatment options, as well as alternatives, risks, and benefits.     This note was created using Dragon voice recognition software that occasionally misinterpreted phrases or words.

## 2020-03-03 NOTE — TELEPHONE ENCOUNTER
Will continue with the same plan but instead of home with home health will switch plan to outpatient infusion Center here at [facility].     Subacute osteomyelitis of left foot, Left 1st toe  -     cefTRIAXone (ROCEPHIN) 500 mg injection; Inject 2 g once daily.    -     doxycycline (VIBRAMYCIN) 100 MG Cap bid  - he has [facility]  DURATION 8 weeks - end date 04/22/2020     Open wound of toe with tendon involvement, Fracture, Sx on 12/18/2020     Up-to-date with tetanus vaccine on 12/18/2019.  Labs  Weekly  TUES  Follow up around 3/11/2020).

## 2020-03-03 NOTE — TELEPHONE ENCOUNTER
He will go to [facility] he lives in [place]. Needs a therapy plan put in so scheduling can get him set up at [facility]    Jessica Gibbons [facility]

## 2020-03-04 ENCOUNTER — INFUSION (OUTPATIENT)
Dept: INFUSION THERAPY | Facility: HOSPITAL | Age: 80
End: 2020-03-04
Attending: INTERNAL MEDICINE
Payer: MEDICARE

## 2020-03-04 VITALS
BODY MASS INDEX: 27.16 KG/M2 | HEIGHT: 77 IN | DIASTOLIC BLOOD PRESSURE: 82 MMHG | TEMPERATURE: 98 F | WEIGHT: 230 LBS | RESPIRATION RATE: 12 BRPM | OXYGEN SATURATION: 99 % | SYSTOLIC BLOOD PRESSURE: 163 MMHG | HEART RATE: 54 BPM

## 2020-03-04 DIAGNOSIS — M86.272 SUBACUTE OSTEOMYELITIS OF LEFT FOOT: Primary | ICD-10-CM

## 2020-03-04 PROCEDURE — 63600175 PHARM REV CODE 636 W HCPCS: Performed by: INTERNAL MEDICINE

## 2020-03-04 PROCEDURE — 96365 THER/PROPH/DIAG IV INF INIT: CPT

## 2020-03-04 RX ORDER — HEPARIN 100 UNIT/ML
300 SYRINGE INTRAVENOUS
Status: DISCONTINUED | OUTPATIENT
Start: 2020-03-04 | End: 2020-03-04 | Stop reason: HOSPADM

## 2020-03-04 RX ORDER — SODIUM CHLORIDE 0.9 % (FLUSH) 0.9 %
10 SYRINGE (ML) INJECTION
Status: CANCELLED | OUTPATIENT
Start: 2020-03-06

## 2020-03-04 RX ORDER — HEPARIN SODIUM (PORCINE) LOCK FLUSH IV SOLN 100 UNIT/ML 100 UNIT/ML
300 SOLUTION INTRAVENOUS
Status: CANCELLED | OUTPATIENT
Start: 2020-03-06

## 2020-03-04 RX ORDER — SODIUM CHLORIDE 0.9 % (FLUSH) 0.9 %
10 SYRINGE (ML) INJECTION
Status: DISCONTINUED | OUTPATIENT
Start: 2020-03-04 | End: 2020-03-04 | Stop reason: HOSPADM

## 2020-03-04 RX ADMIN — Medication 600 UNITS: at 08:03

## 2020-03-04 NOTE — PROGRESS NOTES
LEFT UPPER ARM PICC LINE DRESSING DI NO SIGNS OF INFECTION GOOD BLOOD RETURN FLUSHED WITH NS AND HEP

## 2020-03-05 ENCOUNTER — EXTERNAL HOME HEALTH (OUTPATIENT)
Dept: HOME HEALTH SERVICES | Facility: HOSPITAL | Age: 80
End: 2020-03-05
Payer: MEDICARE

## 2020-03-05 ENCOUNTER — INFUSION (OUTPATIENT)
Dept: INFUSION THERAPY | Facility: HOSPITAL | Age: 80
End: 2020-03-05
Attending: INTERNAL MEDICINE
Payer: MEDICARE

## 2020-03-05 VITALS
OXYGEN SATURATION: 98 % | DIASTOLIC BLOOD PRESSURE: 76 MMHG | HEART RATE: 52 BPM | SYSTOLIC BLOOD PRESSURE: 154 MMHG | RESPIRATION RATE: 16 BRPM | TEMPERATURE: 98 F

## 2020-03-05 DIAGNOSIS — M86.272 SUBACUTE OSTEOMYELITIS OF LEFT FOOT: Primary | ICD-10-CM

## 2020-03-05 PROCEDURE — 63600175 PHARM REV CODE 636 W HCPCS: Performed by: INTERNAL MEDICINE

## 2020-03-05 PROCEDURE — 96365 THER/PROPH/DIAG IV INF INIT: CPT

## 2020-03-05 RX ORDER — HEPARIN SODIUM (PORCINE) LOCK FLUSH IV SOLN 100 UNIT/ML 100 UNIT/ML
300 SOLUTION INTRAVENOUS
Status: DISCONTINUED | OUTPATIENT
Start: 2020-03-05 | End: 2020-03-06 | Stop reason: HOSPADM

## 2020-03-05 RX ORDER — HEPARIN 100 UNIT/ML
300 SYRINGE INTRAVENOUS
Status: DISCONTINUED | OUTPATIENT
Start: 2020-03-05 | End: 2020-03-05 | Stop reason: HOSPADM

## 2020-03-05 RX ORDER — SODIUM CHLORIDE 0.9 % (FLUSH) 0.9 %
10 SYRINGE (ML) INJECTION
Status: CANCELLED | OUTPATIENT
Start: 2020-03-06

## 2020-03-05 RX ORDER — HEPARIN SODIUM (PORCINE) LOCK FLUSH IV SOLN 100 UNIT/ML 100 UNIT/ML
300 SOLUTION INTRAVENOUS
Status: CANCELLED | OUTPATIENT
Start: 2020-03-06

## 2020-03-05 RX ADMIN — Medication 300 UNITS: at 08:03

## 2020-03-06 ENCOUNTER — INFUSION (OUTPATIENT)
Dept: INFUSION THERAPY | Facility: HOSPITAL | Age: 80
End: 2020-03-06
Attending: INTERNAL MEDICINE
Payer: MEDICARE

## 2020-03-06 VITALS
DIASTOLIC BLOOD PRESSURE: 74 MMHG | RESPIRATION RATE: 16 BRPM | TEMPERATURE: 98 F | OXYGEN SATURATION: 95 % | HEART RATE: 55 BPM | SYSTOLIC BLOOD PRESSURE: 132 MMHG

## 2020-03-06 DIAGNOSIS — M86.272 SUBACUTE OSTEOMYELITIS OF LEFT FOOT: Primary | ICD-10-CM

## 2020-03-06 LAB
ALBUMIN SERPL BCP-MCNC: 3.8 G/DL (ref 3.5–5.2)
ALP SERPL-CCNC: 76 U/L (ref 55–135)
ALT SERPL W/O P-5'-P-CCNC: 20 U/L (ref 10–44)
ANION GAP SERPL CALC-SCNC: 8 MMOL/L (ref 8–16)
AST SERPL-CCNC: 19 U/L (ref 10–40)
BASOPHILS # BLD AUTO: 0.04 K/UL (ref 0–0.2)
BASOPHILS NFR BLD: 1.2 % (ref 0–1.9)
BILIRUB SERPL-MCNC: 1 MG/DL (ref 0.1–1)
BUN SERPL-MCNC: 16 MG/DL (ref 8–23)
CALCIUM SERPL-MCNC: 8.7 MG/DL (ref 8.7–10.5)
CHLORIDE SERPL-SCNC: 102 MMOL/L (ref 95–110)
CO2 SERPL-SCNC: 26 MMOL/L (ref 23–29)
CREAT SERPL-MCNC: 0.7 MG/DL (ref 0.5–1.4)
CRP SERPL-MCNC: <0.02 MG/DL (ref 0–0.75)
DIFFERENTIAL METHOD: ABNORMAL
EOSINOPHIL # BLD AUTO: 0.1 K/UL (ref 0–0.5)
EOSINOPHIL NFR BLD: 3.2 % (ref 0–8)
ERYTHROCYTE [DISTWIDTH] IN BLOOD BY AUTOMATED COUNT: 12.8 % (ref 11.5–14.5)
ERYTHROCYTE [SEDIMENTATION RATE] IN BLOOD BY WESTERGREN METHOD: 11 MM/HR (ref 0–10)
EST. GFR  (AFRICAN AMERICAN): >60 ML/MIN/1.73 M^2
EST. GFR  (NON AFRICAN AMERICAN): >60 ML/MIN/1.73 M^2
GLUCOSE SERPL-MCNC: 214 MG/DL (ref 70–110)
HCT VFR BLD AUTO: 40.1 % (ref 40–54)
HGB BLD-MCNC: 13.6 G/DL (ref 14–18)
IMM GRANULOCYTES # BLD AUTO: 0.01 K/UL (ref 0–0.04)
IMM GRANULOCYTES NFR BLD AUTO: 0.3 % (ref 0–0.5)
LYMPHOCYTES # BLD AUTO: 1 K/UL (ref 1–4.8)
LYMPHOCYTES NFR BLD: 27.4 % (ref 18–48)
MCH RBC QN AUTO: 30.5 PG (ref 27–31)
MCHC RBC AUTO-ENTMCNC: 33.9 G/DL (ref 32–36)
MCV RBC AUTO: 90 FL (ref 82–98)
MONOCYTES # BLD AUTO: 0.3 K/UL (ref 0.3–1)
MONOCYTES NFR BLD: 8.6 % (ref 4–15)
NEUTROPHILS # BLD AUTO: 2.1 K/UL (ref 1.8–7.7)
NEUTROPHILS NFR BLD: 59.3 % (ref 38–73)
NRBC BLD-RTO: 0 /100 WBC
PLATELET # BLD AUTO: 118 K/UL (ref 150–350)
PMV BLD AUTO: 10.7 FL (ref 9.2–12.9)
POTASSIUM SERPL-SCNC: 3.9 MMOL/L (ref 3.5–5.1)
PROT SERPL-MCNC: 6.3 G/DL (ref 6–8.4)
RBC # BLD AUTO: 4.46 M/UL (ref 4.6–6.2)
SODIUM SERPL-SCNC: 136 MMOL/L (ref 136–145)
WBC # BLD AUTO: 3.47 K/UL (ref 3.9–12.7)

## 2020-03-06 PROCEDURE — 96365 THER/PROPH/DIAG IV INF INIT: CPT | Mod: 59

## 2020-03-06 PROCEDURE — 63600175 PHARM REV CODE 636 W HCPCS: Performed by: INTERNAL MEDICINE

## 2020-03-06 PROCEDURE — 86140 C-REACTIVE PROTEIN: CPT

## 2020-03-06 PROCEDURE — 99211 OFF/OP EST MAY X REQ PHY/QHP: CPT | Mod: 25

## 2020-03-06 PROCEDURE — 80053 COMPREHEN METABOLIC PANEL: CPT

## 2020-03-06 PROCEDURE — 36591 DRAW BLOOD OFF VENOUS DEVICE: CPT

## 2020-03-06 PROCEDURE — 85025 COMPLETE CBC W/AUTO DIFF WBC: CPT

## 2020-03-06 PROCEDURE — 85651 RBC SED RATE NONAUTOMATED: CPT

## 2020-03-06 RX ORDER — HEPARIN SODIUM (PORCINE) LOCK FLUSH IV SOLN 100 UNIT/ML 100 UNIT/ML
300 SOLUTION INTRAVENOUS
Status: CANCELLED | OUTPATIENT
Start: 2020-03-13

## 2020-03-06 RX ORDER — SODIUM CHLORIDE 0.9 % (FLUSH) 0.9 %
10 SYRINGE (ML) INJECTION
Status: CANCELLED | OUTPATIENT
Start: 2020-03-13

## 2020-03-06 RX ADMIN — Medication 300 UNITS: at 08:03

## 2020-03-07 ENCOUNTER — INFUSION (OUTPATIENT)
Dept: INFUSION THERAPY | Facility: HOSPITAL | Age: 80
End: 2020-03-07
Attending: INTERNAL MEDICINE
Payer: MEDICARE

## 2020-03-07 VITALS
HEART RATE: 55 BPM | SYSTOLIC BLOOD PRESSURE: 145 MMHG | TEMPERATURE: 98 F | OXYGEN SATURATION: 98 % | DIASTOLIC BLOOD PRESSURE: 76 MMHG | RESPIRATION RATE: 16 BRPM

## 2020-03-07 DIAGNOSIS — M86.272 SUBACUTE OSTEOMYELITIS OF LEFT FOOT: Primary | ICD-10-CM

## 2020-03-07 PROCEDURE — 96365 THER/PROPH/DIAG IV INF INIT: CPT

## 2020-03-07 PROCEDURE — 63600175 PHARM REV CODE 636 W HCPCS: Performed by: INTERNAL MEDICINE

## 2020-03-07 RX ORDER — HEPARIN SODIUM (PORCINE) LOCK FLUSH IV SOLN 100 UNIT/ML 100 UNIT/ML
300 SOLUTION INTRAVENOUS
Status: CANCELLED | OUTPATIENT
Start: 2020-03-13

## 2020-03-07 RX ORDER — SODIUM CHLORIDE 0.9 % (FLUSH) 0.9 %
10 SYRINGE (ML) INJECTION
Status: CANCELLED | OUTPATIENT
Start: 2020-03-13

## 2020-03-07 RX ORDER — HEPARIN 100 UNIT/ML
300 SYRINGE INTRAVENOUS
Status: DISCONTINUED | OUTPATIENT
Start: 2020-03-07 | End: 2020-03-07 | Stop reason: HOSPADM

## 2020-03-07 RX ORDER — HEPARIN 100 UNIT/ML
300 SYRINGE INTRAVENOUS
Status: DISCONTINUED | OUTPATIENT
Start: 2020-03-08 | End: 2020-03-08 | Stop reason: HOSPADM

## 2020-03-07 RX ADMIN — CEFTRIAXONE 2 G: 2 INJECTION, SOLUTION INTRAVENOUS at 09:03

## 2020-03-07 RX ADMIN — Medication 300 UNITS: at 10:03

## 2020-03-07 NOTE — PROGRESS NOTES
Vs stable, PICC Line to left upper arm, site healthy and clean, dressing dry and intact. Flushed easily and Rocephin 2 g infused, after infusion lines flushed with 10cc of NS and 3cc of Heparin and green caps applied to hubs. Pt tolerated well.

## 2020-03-08 ENCOUNTER — INFUSION (OUTPATIENT)
Dept: INFUSION THERAPY | Facility: HOSPITAL | Age: 80
End: 2020-03-08
Attending: INTERNAL MEDICINE
Payer: MEDICARE

## 2020-03-08 VITALS
DIASTOLIC BLOOD PRESSURE: 78 MMHG | SYSTOLIC BLOOD PRESSURE: 152 MMHG | OXYGEN SATURATION: 100 % | TEMPERATURE: 98 F | RESPIRATION RATE: 16 BRPM | HEART RATE: 53 BPM

## 2020-03-08 DIAGNOSIS — M86.272 SUBACUTE OSTEOMYELITIS OF LEFT FOOT: Primary | ICD-10-CM

## 2020-03-08 PROCEDURE — 63600175 PHARM REV CODE 636 W HCPCS: Performed by: INTERNAL MEDICINE

## 2020-03-08 PROCEDURE — 96365 THER/PROPH/DIAG IV INF INIT: CPT

## 2020-03-08 RX ORDER — HEPARIN SODIUM (PORCINE) LOCK FLUSH IV SOLN 100 UNIT/ML 100 UNIT/ML
300 SOLUTION INTRAVENOUS
Status: CANCELLED | OUTPATIENT
Start: 2020-03-13

## 2020-03-08 RX ORDER — HEPARIN 100 UNIT/ML
300 SYRINGE INTRAVENOUS
Status: DISCONTINUED | OUTPATIENT
Start: 2020-03-08 | End: 2020-03-09 | Stop reason: HOSPADM

## 2020-03-08 RX ORDER — SODIUM CHLORIDE 0.9 % (FLUSH) 0.9 %
10 SYRINGE (ML) INJECTION
Status: CANCELLED | OUTPATIENT
Start: 2020-03-13

## 2020-03-08 RX ADMIN — CEFTRIAXONE 2 G: 2 INJECTION, SOLUTION INTRAVENOUS at 09:03

## 2020-03-08 RX ADMIN — Medication 300 UNITS: at 10:03

## 2020-03-09 ENCOUNTER — INFUSION (OUTPATIENT)
Dept: INFUSION THERAPY | Facility: HOSPITAL | Age: 80
End: 2020-03-09
Attending: INTERNAL MEDICINE
Payer: MEDICARE

## 2020-03-09 VITALS
HEART RATE: 58 BPM | DIASTOLIC BLOOD PRESSURE: 78 MMHG | SYSTOLIC BLOOD PRESSURE: 145 MMHG | RESPIRATION RATE: 14 BRPM | TEMPERATURE: 98 F | OXYGEN SATURATION: 95 %

## 2020-03-09 DIAGNOSIS — M86.272 SUBACUTE OSTEOMYELITIS OF LEFT FOOT: Primary | ICD-10-CM

## 2020-03-09 PROCEDURE — 63600175 PHARM REV CODE 636 W HCPCS: Performed by: INTERNAL MEDICINE

## 2020-03-09 PROCEDURE — 96365 THER/PROPH/DIAG IV INF INIT: CPT

## 2020-03-09 RX ORDER — SODIUM CHLORIDE 0.9 % (FLUSH) 0.9 %
10 SYRINGE (ML) INJECTION
Status: CANCELLED | OUTPATIENT
Start: 2020-03-13

## 2020-03-09 RX ORDER — HEPARIN 100 UNIT/ML
300 SYRINGE INTRAVENOUS
Status: DISCONTINUED | OUTPATIENT
Start: 2020-03-09 | End: 2020-03-10 | Stop reason: HOSPADM

## 2020-03-09 RX ORDER — HEPARIN SODIUM (PORCINE) LOCK FLUSH IV SOLN 100 UNIT/ML 100 UNIT/ML
300 SOLUTION INTRAVENOUS
Status: CANCELLED | OUTPATIENT
Start: 2020-03-13

## 2020-03-09 RX ORDER — SODIUM CHLORIDE 0.9 % (FLUSH) 0.9 %
10 SYRINGE (ML) INJECTION
Status: DISCONTINUED | OUTPATIENT
Start: 2020-03-09 | End: 2020-03-10 | Stop reason: HOSPADM

## 2020-03-09 RX ADMIN — Medication 600 UNITS: at 08:03

## 2020-03-09 NOTE — PROGRESS NOTES
LEFT UPPER ARM PICC LINE DRESSING DI NO SIGNS OF INFECTION GOOD BLOOD RETURN AND FLUSHED WITH NS AND HEP

## 2020-03-10 ENCOUNTER — OFFICE VISIT (OUTPATIENT)
Dept: WOUND CARE | Facility: HOSPITAL | Age: 80
End: 2020-03-10
Attending: PODIATRIST
Payer: MEDICARE

## 2020-03-10 ENCOUNTER — INFUSION (OUTPATIENT)
Dept: INFUSION THERAPY | Facility: HOSPITAL | Age: 80
End: 2020-03-10
Attending: INTERNAL MEDICINE
Payer: MEDICARE

## 2020-03-10 VITALS
HEART RATE: 53 BPM | OXYGEN SATURATION: 96 % | SYSTOLIC BLOOD PRESSURE: 140 MMHG | RESPIRATION RATE: 16 BRPM | DIASTOLIC BLOOD PRESSURE: 73 MMHG | TEMPERATURE: 98 F

## 2020-03-10 VITALS
TEMPERATURE: 98 F | SYSTOLIC BLOOD PRESSURE: 128 MMHG | DIASTOLIC BLOOD PRESSURE: 86 MMHG | RESPIRATION RATE: 17 BRPM | HEART RATE: 86 BPM

## 2020-03-10 DIAGNOSIS — L97.525 NON-PRESSURE CHRONIC ULCER OF OTHER PART OF LEFT FOOT WITH MUSCLE INVOLVEMENT WITHOUT EVIDENCE OF NECROSIS: Primary | ICD-10-CM

## 2020-03-10 DIAGNOSIS — M86.272 SUBACUTE OSTEOMYELITIS OF LEFT FOOT: Primary | ICD-10-CM

## 2020-03-10 PROCEDURE — 63600175 PHARM REV CODE 636 W HCPCS: Performed by: INTERNAL MEDICINE

## 2020-03-10 PROCEDURE — 11042 DBRDMT SUBQ TIS 1ST 20SQCM/<: CPT | Performed by: PODIATRIST

## 2020-03-10 PROCEDURE — 11730 PR REMOVAL OF NAIL PLATE: ICD-10-PCS | Mod: 51,TA,, | Performed by: PODIATRIST

## 2020-03-10 PROCEDURE — 11730 AVULSION NAIL PLATE SIMPLE 1: CPT | Mod: 51,TA,, | Performed by: PODIATRIST

## 2020-03-10 PROCEDURE — 11042 DBRDMT SUBQ TIS 1ST 20SQCM/<: CPT | Mod: ,,, | Performed by: PODIATRIST

## 2020-03-10 PROCEDURE — 11042 PR DEBRIDEMENT, SKIN, SUB-Q TISSUE,=<20 SQ CM: ICD-10-PCS | Mod: ,,, | Performed by: PODIATRIST

## 2020-03-10 PROCEDURE — 96365 THER/PROPH/DIAG IV INF INIT: CPT | Mod: 59

## 2020-03-10 RX ORDER — SODIUM CHLORIDE 0.9 % (FLUSH) 0.9 %
10 SYRINGE (ML) INJECTION
Status: CANCELLED | OUTPATIENT
Start: 2020-03-13

## 2020-03-10 RX ORDER — HEPARIN SODIUM (PORCINE) LOCK FLUSH IV SOLN 100 UNIT/ML 100 UNIT/ML
300 SOLUTION INTRAVENOUS
Status: CANCELLED | OUTPATIENT
Start: 2020-03-13

## 2020-03-10 RX ORDER — HEPARIN 100 UNIT/ML
300 SYRINGE INTRAVENOUS
Status: DISCONTINUED | OUTPATIENT
Start: 2020-03-11 | End: 2020-03-11 | Stop reason: HOSPADM

## 2020-03-10 RX ADMIN — Medication 300 UNITS: at 08:03

## 2020-03-10 NOTE — PROGRESS NOTES
[address] [facility] [address]  [place] [address]  Phone: [phone]   [phone]    Patient's PCP:Raphael Vargas MD  Referring Provider: No ref. provider found    Subjective:      Chief Complaint:: Wound Care    HPI  Kartik Hermosillo is a 79 y.o. male who presents with a complaint of a wound on his left great toe lasting for 8 weeks. Onset of the symptoms was open fracture of left great toe, which was surgically repaired by Dr. Martinez.  However, patient went on to develop a wound of the left great toe.  Current symptoms include pain and eschar.   Symptoms have increased. Treatment to date have included cleaning with normal saline and leaving it open to air, with no dressing.  He states this is what he was instructed to do.       He was referred to the [facility] by Dr. Martinez. He was placed on oral clindamycin at his last visit with Dr. Martinez on 2/7/20.     Today is the first time I am seeing the patient. I have reviewed his medical chart in detail.  I am concerned that he has underlying osteomyelitis of the left great toe.  I am able to palpate bone underlying the necrotic tissue on the top of his toe.  No drainage to culture.  Therefore, I will be ordering blood work and an MRI of his left foot. I discussed with him the treatment options if the MRI shows osteomyelitis of the toe.   I will include an A1c in his blood work as he has been told in the past that he is a borderline diabetic.     Until the results of the lab work and MRI are back, patient is to dress his toe consisting of Santyl, 4x4s, and gauze every day. He will also be receiving supplies I am ordering to change his dressings. Supplies will include Iodoflex and patient will begin dressing his toe using Iodoflex every other day, if he is not able to afford the santyl medication.        2/18:  Patient's wound has improved since last week.  Patient was approved for an MRI, and instructed to schedule with [facility] imaging.  My office called  him in order to schedule, but he had not returned the call.  Therefore, I had patient go directly to estimate imaging following this appointment in order to schedule his MRI.  Patient has been dressing with Iodosorb and dressing supplies.   He states he finished his oral antibiotics prescribed by Dr. Cruz this past Saturday. Patient to continue dressing with Iodosorb and dressing supplies.      2/24:  MRI came back showing osteomyelitis   1. Osteomyelitis of left great toe proximal phalangeal head.  2. Confluent low T1 bone marrow signal alteration affecting left great toe distal phalangeal base also suspicious for osteomyelitis given extent.  Bone marrow signal alteration related to healed fracture is felt less likely.  3. Negative for soft tissue abscess.  Patient referred to Infectious Disease for IV antibiotics.  Discussed patient with Dr. Field.  Bone biopsy taken by Dr. Gunner Rizvi on Friday, February 21st.  I was not able to do bone biopsy, due to being in wound clinic at the time.    Awaiting bone biopsy results.  Patient to continue Iodosorb dressing changes and follow up in 1 week. Debrided today.  Wound has improved.  Bone is still palpable.     3/3:  Patient currently receiving IV antibiotics in addition to oral antibiotics from Infectious Disease. Wound is improving.  Continue with Iodosorb.  Debrided today.    3/10:  Patient is still receiving IV antibiotics in addition to oral antibiotics from Infectious Disease.  Wound has improved again from last week.  Continue with Iodosorb.  Debrided today.  The toe itself has erythema and a mild increase in temperature.  I discussed with Dr. Alfredo who is treating patient for his osteomyelitis.  Picture in chart.  Left great toenail was removed today due to over 50% of the nail bed showing subungual hematoma.    Vitals:    03/10/20 1128   BP: 128/86   Pulse: 86   Resp: 17   Temp: 98.3 °F (36.8 °C)     Shoe Size:     Past Surgical History:   Procedure  Laterality Date    APPENDECTOMY      INCISION AND DRAINAGE Left 12/18/2019    Procedure: INCISION AND DRAINAGE;  Surgeon: Nick Martinez MD;  Location: [facility] OR;  Service: Orthopedics;  Laterality: Left;    REPAIR OF EXTENSOR TENDON Left 12/18/2019    Procedure: REPAIR, TENDON, EXTENSOR;  Surgeon: Nick Martinez MD;  Location: [facility] OR;  Service: Orthopedics;  Laterality: Left;  KWIRE, JERGENS BALLS     No past medical history on file.  Family History   Problem Relation Age of Onset    Diabetes Father         Social History:   Marital Status: Single  Alcohol History:  reports that he drank alcohol.  Tobacco History:  reports that he has never smoked. He has never used smokeless tobacco.  Drug History:  reports that he does not use drugs.    Review of patient's allergies indicates:  No Known Allergies    Current Outpatient Medications   Medication Sig Dispense Refill    aspirin (ECOTRIN) 81 MG EC tablet Take 1 tablet by mouth once daily.      doxycycline (VIBRAMYCIN) 100 MG Cap Take 1 capsule (100 mg total) by mouth every 12 (twelve) hours. 60 capsule 2    SANTYL ointment Apply topically once daily. 30 g 3    triamcinolone acetonide 0.1% (KENALOG) 0.1 % cream 1 application to affected area       No current facility-administered medications for this visit.        Review of Systems      Objective:        Physical Exam:   Foot Exam  Physical Exam  Much of the documentation for this visit was completed in the Wound Docs system.  Please see the attached documentation for further details about the patient's care. Scanned under the Media tab.        Karis Rizvi DPM   Imaging:            Assessment:       1. 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    Procedures -Obtained verbal consent for non-permanent removal of left great toe nail plate Skin prepped with alcohol.Followed by digital nerve block with 5 mL of 1% plain lidocaine. . Nail folds were freed from the  nail borders followed by complete excision of nailNo remaining nail spicule noted.  Wound site irrigated with NS, Iodosorp was applied then covered with gauze and secured with a sterile bandage.        Much of the documentation for this visit was completed in the Wound Docs system.  Please see the attached documentation for further details about the patient's care. Scanned under the Media tab.        Karis Rizvi DPM     Counseling:     I provided patient education verbally regarding:   Patient diagnosis, treatment options, as well as alternatives, risks, and benefits.     This note was created using Dragon voice recognition software that occasionally misinterpreted phrases or words.

## 2020-03-11 ENCOUNTER — INFUSION (OUTPATIENT)
Dept: INFUSION THERAPY | Facility: HOSPITAL | Age: 80
End: 2020-03-11
Attending: INTERNAL MEDICINE
Payer: MEDICARE

## 2020-03-11 VITALS
HEART RATE: 52 BPM | RESPIRATION RATE: 12 BRPM | TEMPERATURE: 98 F | OXYGEN SATURATION: 98 % | DIASTOLIC BLOOD PRESSURE: 77 MMHG | SYSTOLIC BLOOD PRESSURE: 138 MMHG

## 2020-03-11 DIAGNOSIS — M86.272 SUBACUTE OSTEOMYELITIS OF LEFT FOOT: Primary | ICD-10-CM

## 2020-03-11 PROCEDURE — 63600175 PHARM REV CODE 636 W HCPCS: Performed by: INTERNAL MEDICINE

## 2020-03-11 PROCEDURE — 96365 THER/PROPH/DIAG IV INF INIT: CPT | Mod: 59

## 2020-03-11 RX ORDER — SODIUM CHLORIDE 0.9 % (FLUSH) 0.9 %
10 SYRINGE (ML) INJECTION
Status: DISCONTINUED | OUTPATIENT
Start: 2020-03-11 | End: 2020-03-12 | Stop reason: HOSPADM

## 2020-03-11 RX ORDER — SODIUM CHLORIDE 0.9 % (FLUSH) 0.9 %
10 SYRINGE (ML) INJECTION
Status: CANCELLED | OUTPATIENT
Start: 2020-03-13

## 2020-03-11 RX ORDER — HEPARIN SODIUM (PORCINE) LOCK FLUSH IV SOLN 100 UNIT/ML 100 UNIT/ML
300 SOLUTION INTRAVENOUS
Status: CANCELLED | OUTPATIENT
Start: 2020-03-13

## 2020-03-11 RX ORDER — HEPARIN 100 UNIT/ML
300 SYRINGE INTRAVENOUS
Status: DISCONTINUED | OUTPATIENT
Start: 2020-03-11 | End: 2020-03-12 | Stop reason: HOSPADM

## 2020-03-11 RX ADMIN — Medication 600 UNITS: at 08:03

## 2020-03-12 ENCOUNTER — INFUSION (OUTPATIENT)
Dept: INFUSION THERAPY | Facility: HOSPITAL | Age: 80
End: 2020-03-12
Attending: INTERNAL MEDICINE
Payer: MEDICARE

## 2020-03-12 VITALS
HEART RATE: 54 BPM | SYSTOLIC BLOOD PRESSURE: 145 MMHG | TEMPERATURE: 98 F | OXYGEN SATURATION: 96 % | RESPIRATION RATE: 16 BRPM | DIASTOLIC BLOOD PRESSURE: 78 MMHG

## 2020-03-12 DIAGNOSIS — M86.272 SUBACUTE OSTEOMYELITIS OF LEFT FOOT: Primary | ICD-10-CM

## 2020-03-12 PROCEDURE — 96365 THER/PROPH/DIAG IV INF INIT: CPT | Mod: 59

## 2020-03-12 PROCEDURE — 63600175 PHARM REV CODE 636 W HCPCS: Performed by: INTERNAL MEDICINE

## 2020-03-12 RX ORDER — SODIUM CHLORIDE 0.9 % (FLUSH) 0.9 %
10 SYRINGE (ML) INJECTION
Status: CANCELLED | OUTPATIENT
Start: 2020-03-13

## 2020-03-12 RX ORDER — HEPARIN 100 UNIT/ML
300 SYRINGE INTRAVENOUS
Status: DISCONTINUED | OUTPATIENT
Start: 2020-03-13 | End: 2020-03-13 | Stop reason: HOSPADM

## 2020-03-12 RX ORDER — HEPARIN SODIUM (PORCINE) LOCK FLUSH IV SOLN 100 UNIT/ML 100 UNIT/ML
300 SOLUTION INTRAVENOUS
Status: CANCELLED | OUTPATIENT
Start: 2020-03-13

## 2020-03-12 RX ADMIN — HEPARIN 300 UNITS: 100 SYRINGE at 08:03

## 2020-03-13 ENCOUNTER — INFUSION (OUTPATIENT)
Dept: INFUSION THERAPY | Facility: HOSPITAL | Age: 80
End: 2020-03-13
Attending: INTERNAL MEDICINE
Payer: MEDICARE

## 2020-03-13 VITALS
OXYGEN SATURATION: 96 % | SYSTOLIC BLOOD PRESSURE: 150 MMHG | DIASTOLIC BLOOD PRESSURE: 78 MMHG | RESPIRATION RATE: 16 BRPM | TEMPERATURE: 98 F | HEART RATE: 55 BPM

## 2020-03-13 DIAGNOSIS — M86.272 SUBACUTE OSTEOMYELITIS OF LEFT FOOT: Primary | ICD-10-CM

## 2020-03-13 LAB
ALBUMIN SERPL BCP-MCNC: 3.9 G/DL (ref 3.5–5.2)
ALP SERPL-CCNC: 70 U/L (ref 55–135)
ALT SERPL W/O P-5'-P-CCNC: 23 U/L (ref 10–44)
ANION GAP SERPL CALC-SCNC: 6 MMOL/L (ref 8–16)
AST SERPL-CCNC: 22 U/L (ref 10–40)
BASOPHILS # BLD AUTO: 0.03 K/UL (ref 0–0.2)
BASOPHILS NFR BLD: 0.8 % (ref 0–1.9)
BILIRUB SERPL-MCNC: 1 MG/DL (ref 0.1–1)
BUN SERPL-MCNC: 23 MG/DL (ref 8–23)
CALCIUM SERPL-MCNC: 8.8 MG/DL (ref 8.7–10.5)
CHLORIDE SERPL-SCNC: 103 MMOL/L (ref 95–110)
CO2 SERPL-SCNC: 28 MMOL/L (ref 23–29)
CREAT SERPL-MCNC: 0.8 MG/DL (ref 0.5–1.4)
CRP SERPL-MCNC: <0.02 MG/DL (ref 0–0.75)
DIFFERENTIAL METHOD: ABNORMAL
EOSINOPHIL # BLD AUTO: 0.1 K/UL (ref 0–0.5)
EOSINOPHIL NFR BLD: 3 % (ref 0–8)
ERYTHROCYTE [DISTWIDTH] IN BLOOD BY AUTOMATED COUNT: 12.9 % (ref 11.5–14.5)
ERYTHROCYTE [SEDIMENTATION RATE] IN BLOOD BY WESTERGREN METHOD: 7 MM/HR (ref 0–10)
EST. GFR  (AFRICAN AMERICAN): >60 ML/MIN/1.73 M^2
EST. GFR  (NON AFRICAN AMERICAN): >60 ML/MIN/1.73 M^2
GLUCOSE SERPL-MCNC: 205 MG/DL (ref 70–110)
HCT VFR BLD AUTO: 40.9 % (ref 40–54)
HGB BLD-MCNC: 13.6 G/DL (ref 14–18)
IMM GRANULOCYTES # BLD AUTO: 0.02 K/UL (ref 0–0.04)
IMM GRANULOCYTES NFR BLD AUTO: 0.5 % (ref 0–0.5)
LYMPHOCYTES # BLD AUTO: 0.9 K/UL (ref 1–4.8)
LYMPHOCYTES NFR BLD: 25.8 % (ref 18–48)
MCH RBC QN AUTO: 30 PG (ref 27–31)
MCHC RBC AUTO-ENTMCNC: 33.3 G/DL (ref 32–36)
MCV RBC AUTO: 90 FL (ref 82–98)
MONOCYTES # BLD AUTO: 0.4 K/UL (ref 0.3–1)
MONOCYTES NFR BLD: 10.1 % (ref 4–15)
NEUTROPHILS # BLD AUTO: 2.2 K/UL (ref 1.8–7.7)
NEUTROPHILS NFR BLD: 59.8 % (ref 38–73)
NRBC BLD-RTO: 0 /100 WBC
PLATELET # BLD AUTO: 128 K/UL (ref 150–350)
PMV BLD AUTO: 10.9 FL (ref 9.2–12.9)
POTASSIUM SERPL-SCNC: 3.8 MMOL/L (ref 3.5–5.1)
PROT SERPL-MCNC: 6.3 G/DL (ref 6–8.4)
RBC # BLD AUTO: 4.54 M/UL (ref 4.6–6.2)
SODIUM SERPL-SCNC: 137 MMOL/L (ref 136–145)
WBC # BLD AUTO: 3.65 K/UL (ref 3.9–12.7)

## 2020-03-13 PROCEDURE — 85025 COMPLETE CBC W/AUTO DIFF WBC: CPT

## 2020-03-13 PROCEDURE — 86140 C-REACTIVE PROTEIN: CPT

## 2020-03-13 PROCEDURE — 85651 RBC SED RATE NONAUTOMATED: CPT

## 2020-03-13 PROCEDURE — 80053 COMPREHEN METABOLIC PANEL: CPT

## 2020-03-13 PROCEDURE — 36591 DRAW BLOOD OFF VENOUS DEVICE: CPT

## 2020-03-13 PROCEDURE — 99211 OFF/OP EST MAY X REQ PHY/QHP: CPT | Mod: 25

## 2020-03-13 PROCEDURE — 63600175 PHARM REV CODE 636 W HCPCS: Performed by: INTERNAL MEDICINE

## 2020-03-13 PROCEDURE — 96365 THER/PROPH/DIAG IV INF INIT: CPT

## 2020-03-13 RX ORDER — SODIUM CHLORIDE 0.9 % (FLUSH) 0.9 %
10 SYRINGE (ML) INJECTION
Status: CANCELLED | OUTPATIENT
Start: 2020-03-20

## 2020-03-13 RX ORDER — SODIUM CHLORIDE 0.9 % (FLUSH) 0.9 %
10 SYRINGE (ML) INJECTION
Status: DISCONTINUED | OUTPATIENT
Start: 2020-03-13 | End: 2020-03-14 | Stop reason: HOSPADM

## 2020-03-13 RX ORDER — HEPARIN 100 UNIT/ML
300 SYRINGE INTRAVENOUS
Status: DISCONTINUED | OUTPATIENT
Start: 2020-03-14 | End: 2020-03-14 | Stop reason: HOSPADM

## 2020-03-13 RX ORDER — HEPARIN SODIUM (PORCINE) LOCK FLUSH IV SOLN 100 UNIT/ML 100 UNIT/ML
300 SOLUTION INTRAVENOUS
Status: CANCELLED | OUTPATIENT
Start: 2020-03-20

## 2020-03-13 RX ADMIN — Medication 300 UNITS: at 08:03

## 2020-03-14 ENCOUNTER — INFUSION (OUTPATIENT)
Dept: INFUSION THERAPY | Facility: HOSPITAL | Age: 80
End: 2020-03-14
Attending: INTERNAL MEDICINE
Payer: MEDICARE

## 2020-03-14 VITALS
TEMPERATURE: 98 F | RESPIRATION RATE: 16 BRPM | SYSTOLIC BLOOD PRESSURE: 139 MMHG | HEART RATE: 52 BPM | OXYGEN SATURATION: 95 % | DIASTOLIC BLOOD PRESSURE: 69 MMHG

## 2020-03-14 DIAGNOSIS — M86.272 SUBACUTE OSTEOMYELITIS OF LEFT FOOT: Primary | ICD-10-CM

## 2020-03-14 PROCEDURE — 63600175 PHARM REV CODE 636 W HCPCS: Performed by: INTERNAL MEDICINE

## 2020-03-14 PROCEDURE — 96365 THER/PROPH/DIAG IV INF INIT: CPT

## 2020-03-14 RX ADMIN — HEPARIN 300 UNITS: 100 SYRINGE at 11:03

## 2020-03-14 RX ADMIN — CEFTRIAXONE 2 G: 2 INJECTION, SOLUTION INTRAVENOUS at 10:03

## 2020-03-15 ENCOUNTER — INFUSION (OUTPATIENT)
Dept: INFUSION THERAPY | Facility: HOSPITAL | Age: 80
End: 2020-03-15
Attending: INTERNAL MEDICINE
Payer: MEDICARE

## 2020-03-15 VITALS
DIASTOLIC BLOOD PRESSURE: 79 MMHG | RESPIRATION RATE: 16 BRPM | SYSTOLIC BLOOD PRESSURE: 158 MMHG | OXYGEN SATURATION: 97 % | TEMPERATURE: 98 F | HEART RATE: 56 BPM

## 2020-03-15 DIAGNOSIS — M86.272 SUBACUTE OSTEOMYELITIS OF LEFT FOOT: Primary | ICD-10-CM

## 2020-03-15 PROCEDURE — 63600175 PHARM REV CODE 636 W HCPCS: Performed by: INTERNAL MEDICINE

## 2020-03-15 PROCEDURE — 96365 THER/PROPH/DIAG IV INF INIT: CPT

## 2020-03-15 RX ORDER — HEPARIN 100 UNIT/ML
300 SYRINGE INTRAVENOUS
Status: DISCONTINUED | OUTPATIENT
Start: 2020-03-15 | End: 2020-03-15 | Stop reason: HOSPADM

## 2020-03-15 RX ORDER — HEPARIN SODIUM (PORCINE) LOCK FLUSH IV SOLN 100 UNIT/ML 100 UNIT/ML
300 SOLUTION INTRAVENOUS
Status: CANCELLED | OUTPATIENT
Start: 2020-03-20

## 2020-03-15 RX ORDER — SODIUM CHLORIDE 0.9 % (FLUSH) 0.9 %
10 SYRINGE (ML) INJECTION
Status: CANCELLED | OUTPATIENT
Start: 2020-03-20

## 2020-03-15 RX ORDER — HEPARIN 100 UNIT/ML
300 SYRINGE INTRAVENOUS
Status: DISCONTINUED | OUTPATIENT
Start: 2020-03-15 | End: 2020-03-16 | Stop reason: HOSPADM

## 2020-03-15 RX ADMIN — HEPARIN 300 UNITS: 100 SYRINGE at 09:03

## 2020-03-15 RX ADMIN — CEFTRIAXONE 2 G: 2 INJECTION, SOLUTION INTRAVENOUS at 09:03

## 2020-03-16 ENCOUNTER — INFUSION (OUTPATIENT)
Dept: INFUSION THERAPY | Facility: HOSPITAL | Age: 80
End: 2020-03-16
Attending: INTERNAL MEDICINE
Payer: MEDICARE

## 2020-03-16 VITALS
SYSTOLIC BLOOD PRESSURE: 145 MMHG | RESPIRATION RATE: 14 BRPM | DIASTOLIC BLOOD PRESSURE: 86 MMHG | HEART RATE: 52 BPM | TEMPERATURE: 98 F | OXYGEN SATURATION: 98 %

## 2020-03-16 DIAGNOSIS — M86.272 SUBACUTE OSTEOMYELITIS OF LEFT FOOT: Primary | ICD-10-CM

## 2020-03-16 PROCEDURE — 96365 THER/PROPH/DIAG IV INF INIT: CPT

## 2020-03-16 PROCEDURE — 63600175 PHARM REV CODE 636 W HCPCS: Performed by: INTERNAL MEDICINE

## 2020-03-16 RX ORDER — SODIUM CHLORIDE 0.9 % (FLUSH) 0.9 %
10 SYRINGE (ML) INJECTION
Status: CANCELLED | OUTPATIENT
Start: 2020-03-20

## 2020-03-16 RX ORDER — SODIUM CHLORIDE 0.9 % (FLUSH) 0.9 %
10 SYRINGE (ML) INJECTION
Status: DISCONTINUED | OUTPATIENT
Start: 2020-03-16 | End: 2020-03-17 | Stop reason: HOSPADM

## 2020-03-16 RX ORDER — HEPARIN 100 UNIT/ML
300 SYRINGE INTRAVENOUS
Status: DISCONTINUED | OUTPATIENT
Start: 2020-03-16 | End: 2020-03-17 | Stop reason: HOSPADM

## 2020-03-16 RX ORDER — HEPARIN SODIUM (PORCINE) LOCK FLUSH IV SOLN 100 UNIT/ML 100 UNIT/ML
300 SOLUTION INTRAVENOUS
Status: CANCELLED | OUTPATIENT
Start: 2020-03-20

## 2020-03-17 ENCOUNTER — INFUSION (OUTPATIENT)
Dept: INFUSION THERAPY | Facility: HOSPITAL | Age: 80
End: 2020-03-17
Attending: INTERNAL MEDICINE
Payer: MEDICARE

## 2020-03-17 ENCOUNTER — OFFICE VISIT (OUTPATIENT)
Dept: WOUND CARE | Facility: HOSPITAL | Age: 80
End: 2020-03-17
Attending: PODIATRIST
Payer: MEDICARE

## 2020-03-17 VITALS
RESPIRATION RATE: 16 BRPM | TEMPERATURE: 98 F | SYSTOLIC BLOOD PRESSURE: 130 MMHG | HEART RATE: 84 BPM | DIASTOLIC BLOOD PRESSURE: 84 MMHG

## 2020-03-17 VITALS
SYSTOLIC BLOOD PRESSURE: 139 MMHG | TEMPERATURE: 98 F | OXYGEN SATURATION: 98 % | DIASTOLIC BLOOD PRESSURE: 70 MMHG | HEART RATE: 58 BPM | RESPIRATION RATE: 16 BRPM

## 2020-03-17 DIAGNOSIS — L97.525 NON-PRESSURE CHRONIC ULCER OF OTHER PART OF LEFT FOOT WITH MUSCLE INVOLVEMENT WITHOUT EVIDENCE OF NECROSIS: Primary | ICD-10-CM

## 2020-03-17 DIAGNOSIS — M86.272 SUBACUTE OSTEOMYELITIS OF LEFT FOOT: Primary | ICD-10-CM

## 2020-03-17 PROCEDURE — 11042 DBRDMT SUBQ TIS 1ST 20SQCM/<: CPT | Mod: ,,, | Performed by: PODIATRIST

## 2020-03-17 PROCEDURE — 11042 PR DEBRIDEMENT, SKIN, SUB-Q TISSUE,=<20 SQ CM: ICD-10-PCS | Mod: ,,, | Performed by: PODIATRIST

## 2020-03-17 PROCEDURE — 63600175 PHARM REV CODE 636 W HCPCS: Performed by: INTERNAL MEDICINE

## 2020-03-17 PROCEDURE — 11042 DBRDMT SUBQ TIS 1ST 20SQCM/<: CPT | Performed by: PODIATRIST

## 2020-03-17 PROCEDURE — 96365 THER/PROPH/DIAG IV INF INIT: CPT

## 2020-03-17 RX ORDER — SODIUM CHLORIDE 0.9 % (FLUSH) 0.9 %
10 SYRINGE (ML) INJECTION
Status: CANCELLED | OUTPATIENT
Start: 2020-03-20

## 2020-03-17 RX ORDER — HEPARIN SODIUM (PORCINE) LOCK FLUSH IV SOLN 100 UNIT/ML 100 UNIT/ML
300 SOLUTION INTRAVENOUS
Status: CANCELLED | OUTPATIENT
Start: 2020-03-20

## 2020-03-17 RX ORDER — HEPARIN 100 UNIT/ML
300 SYRINGE INTRAVENOUS
Status: DISCONTINUED | OUTPATIENT
Start: 2020-03-17 | End: 2020-03-18 | Stop reason: HOSPADM

## 2020-03-17 RX ADMIN — HEPARIN 300 UNITS: 100 SYRINGE at 11:03

## 2020-03-17 NOTE — PROGRESS NOTES
[address] [facility] [address]  [place] [address]  Phone: [phone]   [phone]    Patient's PCP:Raphael Vargas MD  Referring Provider: No ref. provider found    Subjective:      Chief Complaint:: Wound Care    HPI  Kartik Hermosillo is a 79 y.o. male who presents with a complaint of a wound on his left great toe lasting for 8 weeks. Onset of the symptoms was open fracture of left great toe, which was surgically repaired by Dr. Martinez.  However, patient went on to develop a wound of the left great toe.  Current symptoms include pain and eschar.   Symptoms have increased. Treatment to date have included cleaning with normal saline and leaving it open to air, with no dressing.  He states this is what he was instructed to do.       He was referred to the wound Care Center by Dr. Martinez. He was placed on oral clindamycin at his last visit with Dr. Martinez on 2/7/20.     Today is the first time I am seeing the patient. I have reviewed his medical chart in detail.  I am concerned that he has underlying osteomyelitis of the left great toe.  I am able to palpate bone underlying the necrotic tissue on the top of his toe.  No drainage to culture.  Therefore, I will be ordering blood work and an MRI of his left foot. I discussed with him the treatment options if the MRI shows osteomyelitis of the toe.   I will include an A1c in his blood work as he has been told in the past that he is a borderline diabetic.     Until the results of the lab work and MRI are back, patient is to dress his toe consisting of Santyl, 4x4s, and gauze every day. He will also be receiving supplies I am ordering to change his dressings. Supplies will include Iodoflex and patient will begin dressing his toe using Iodoflex every other day, if he is not able to afford the santyl medication.        2/18:  Patient's wound has improved since last week.  Patient was approved for an MRI, and instructed to schedule with [facility] imaging.  My office called  him in order to schedule, but he had not returned the call.  Therefore, I had patient go directly to estimate imaging following this appointment in order to schedule his MRI.  Patient has been dressing with Iodosorb and dressing supplies.   He states he finished his oral antibiotics prescribed by Dr. Cruz this past Saturday. Patient to continue dressing with Iodosorb and dressing supplies.      2/24:  MRI came back showing osteomyelitis   1. Osteomyelitis of left great toe proximal phalangeal head.  2. Confluent low T1 bone marrow signal alteration affecting left great toe distal phalangeal base also suspicious for osteomyelitis given extent.  Bone marrow signal alteration related to healed fracture is felt less likely.  3. Negative for soft tissue abscess.  Patient referred to Infectious Disease for IV antibiotics.  Discussed patient with Dr. Field.  Bone biopsy taken by Dr. Gunner Rizvi on Friday, February 21st.  I was not able to do bone biopsy, due to being in wound clinic at the time.    Awaiting bone biopsy results.  Patient to continue Iodosorb dressing changes and follow up in 1 week. Debrided today.  Wound has improved.  Bone is still palpable.     3/3:  Patient currently receiving IV antibiotics in addition to oral antibiotics from Infectious Disease. Wound is improving.  Continue with Iodosorb.  Debrided today.     3/10:  Patient is still receiving IV antibiotics in addition to oral antibiotics from Infectious Disease.  Wound has improved again from last week.  Continue with Iodosorb.  Debrided today.  The toe itself has erythema and a mild increase in temperature.  I discussed with Dr. Alfredo who is treating patient for his osteomyelitis.  Picture in chart.  Left great toenail was removed today due to over 50% of the nail bed showing subungual hematoma.    3/17:   Wound has improved again from last week.  Continue with Iodosorb.  Debrided today.  The erythema has improved since last week.  Left great  toenail avulsion is healing normally.  Follow up in 1 week.   Vitals:    03/17/20 1124   BP: 130/84   Pulse: 84   Resp: 16   Temp: 97.8 °F (36.6 °C)     Shoe Size:     Past Surgical History:   Procedure Laterality Date    APPENDECTOMY      INCISION AND DRAINAGE Left 12/18/2019    Procedure: INCISION AND DRAINAGE;  Surgeon: Nick Martinez MD;  Location: [facility] OR;  Service: Orthopedics;  Laterality: Left;    REPAIR OF EXTENSOR TENDON Left 12/18/2019    Procedure: REPAIR, TENDON, EXTENSOR;  Surgeon: Nick Martinez MD;  Location: [facility] OR;  Service: Orthopedics;  Laterality: Left;  PRACHI ANGEL     History reviewed. No pertinent past medical history.  Family History   Problem Relation Age of Onset    Diabetes Father         Social History:   Marital Status: Single  Alcohol History:  reports that he drank alcohol.  Tobacco History:  reports that he has never smoked. He has never used smokeless tobacco.  Drug History:  reports that he does not use drugs.    Review of patient's allergies indicates:  No Known Allergies    Current Outpatient Medications   Medication Sig Dispense Refill    aspirin (ECOTRIN) 81 MG EC tablet Take 1 tablet by mouth once daily.      doxycycline (VIBRAMYCIN) 100 MG Cap Take 1 capsule (100 mg total) by mouth every 12 (twelve) hours. 60 capsule 2    triamcinolone acetonide 0.1% (KENALOG) 0.1 % cream 1 application to affected area       No current facility-administered medications for this visit.        Review of Systems      Objective:        Physical Exam:   Foot Exam  Physical Exam  Much of the documentation for this visit was completed in the Wound Docs system.  Please see the attached documentation for further details about the patient's care. Scanned under the Media tab.        Karis Rizvi DPM   Imaging:            Assessment:       1. 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    Procedures - Much of the documentation for this visit was completed in the Wound Docs system.  Please see the attached documentation for further details about the patient's care. Scanned under the Media tab.        Karis Rizvi DPM     Counseling:     I provided patient education verbally regarding:   Patient diagnosis, treatment options, as well as alternatives, risks, and benefits.     This note was created using Dragon voice recognition software that occasionally misinterpreted phrases or words.

## 2020-03-18 ENCOUNTER — INFUSION (OUTPATIENT)
Dept: INFUSION THERAPY | Facility: HOSPITAL | Age: 80
End: 2020-03-18
Attending: INTERNAL MEDICINE
Payer: MEDICARE

## 2020-03-18 VITALS
DIASTOLIC BLOOD PRESSURE: 84 MMHG | SYSTOLIC BLOOD PRESSURE: 160 MMHG | TEMPERATURE: 98 F | OXYGEN SATURATION: 96 % | RESPIRATION RATE: 18 BRPM | HEART RATE: 60 BPM

## 2020-03-18 DIAGNOSIS — M86.272 SUBACUTE OSTEOMYELITIS OF LEFT FOOT: Primary | ICD-10-CM

## 2020-03-18 PROCEDURE — 96365 THER/PROPH/DIAG IV INF INIT: CPT

## 2020-03-18 PROCEDURE — 63600175 PHARM REV CODE 636 W HCPCS: Performed by: INTERNAL MEDICINE

## 2020-03-18 RX ORDER — SODIUM CHLORIDE 0.9 % (FLUSH) 0.9 %
10 SYRINGE (ML) INJECTION
Status: CANCELLED | OUTPATIENT
Start: 2020-03-20

## 2020-03-18 RX ORDER — HEPARIN SODIUM (PORCINE) LOCK FLUSH IV SOLN 100 UNIT/ML 100 UNIT/ML
300 SOLUTION INTRAVENOUS
Status: CANCELLED | OUTPATIENT
Start: 2020-03-20

## 2020-03-18 RX ORDER — SODIUM CHLORIDE 0.9 % (FLUSH) 0.9 %
10 SYRINGE (ML) INJECTION
Status: DISCONTINUED | OUTPATIENT
Start: 2020-03-18 | End: 2020-03-19 | Stop reason: HOSPADM

## 2020-03-18 RX ORDER — HEPARIN 100 UNIT/ML
300 SYRINGE INTRAVENOUS
Status: DISCONTINUED | OUTPATIENT
Start: 2020-03-18 | End: 2020-03-19 | Stop reason: HOSPADM

## 2020-03-18 RX ADMIN — Medication 600 UNITS: at 08:03

## 2020-03-19 ENCOUNTER — INFUSION (OUTPATIENT)
Dept: INFUSION THERAPY | Facility: HOSPITAL | Age: 80
End: 2020-03-19
Attending: INTERNAL MEDICINE
Payer: MEDICARE

## 2020-03-19 VITALS
OXYGEN SATURATION: 98 % | DIASTOLIC BLOOD PRESSURE: 81 MMHG | RESPIRATION RATE: 16 BRPM | HEART RATE: 54 BPM | TEMPERATURE: 98 F | SYSTOLIC BLOOD PRESSURE: 172 MMHG

## 2020-03-19 DIAGNOSIS — M86.272 SUBACUTE OSTEOMYELITIS OF LEFT FOOT: Primary | ICD-10-CM

## 2020-03-19 PROCEDURE — 63600175 PHARM REV CODE 636 W HCPCS: Performed by: INTERNAL MEDICINE

## 2020-03-19 PROCEDURE — 96365 THER/PROPH/DIAG IV INF INIT: CPT | Mod: 59

## 2020-03-19 RX ORDER — SODIUM CHLORIDE 0.9 % (FLUSH) 0.9 %
10 SYRINGE (ML) INJECTION
Status: CANCELLED | OUTPATIENT
Start: 2020-03-20

## 2020-03-19 RX ORDER — HEPARIN SODIUM (PORCINE) LOCK FLUSH IV SOLN 100 UNIT/ML 100 UNIT/ML
300 SOLUTION INTRAVENOUS
Status: CANCELLED | OUTPATIENT
Start: 2020-03-20

## 2020-03-19 RX ORDER — HEPARIN 100 UNIT/ML
300 SYRINGE INTRAVENOUS
Status: DISCONTINUED | OUTPATIENT
Start: 2020-03-19 | End: 2020-03-20 | Stop reason: HOSPADM

## 2020-03-19 RX ADMIN — HEPARIN 300 UNITS: 100 SYRINGE at 08:03

## 2020-03-20 ENCOUNTER — OFFICE VISIT (OUTPATIENT)
Dept: ORTHOPEDICS | Facility: CLINIC | Age: 80
End: 2020-03-20
Payer: MEDICARE

## 2020-03-20 ENCOUNTER — INFUSION (OUTPATIENT)
Dept: INFUSION THERAPY | Facility: HOSPITAL | Age: 80
End: 2020-03-20
Attending: INTERNAL MEDICINE
Payer: MEDICARE

## 2020-03-20 VITALS
SYSTOLIC BLOOD PRESSURE: 169 MMHG | OXYGEN SATURATION: 97 % | HEART RATE: 53 BPM | DIASTOLIC BLOOD PRESSURE: 79 MMHG | TEMPERATURE: 98 F | RESPIRATION RATE: 16 BRPM

## 2020-03-20 VITALS — WEIGHT: 230 LBS | RESPIRATION RATE: 16 BRPM | HEIGHT: 77 IN | BODY MASS INDEX: 27.16 KG/M2

## 2020-03-20 DIAGNOSIS — S92.422D CLOSED DISPLACED FRACTURE OF DISTAL PHALANX OF LEFT GREAT TOE WITH ROUTINE HEALING, SUBSEQUENT ENCOUNTER: Primary | ICD-10-CM

## 2020-03-20 DIAGNOSIS — M86.272 SUBACUTE OSTEOMYELITIS OF LEFT FOOT: Primary | ICD-10-CM

## 2020-03-20 LAB
ALBUMIN SERPL BCP-MCNC: 4.2 G/DL (ref 3.5–5.2)
ALP SERPL-CCNC: 71 U/L (ref 55–135)
ALT SERPL W/O P-5'-P-CCNC: 23 U/L (ref 10–44)
ANION GAP SERPL CALC-SCNC: 6 MMOL/L (ref 8–16)
AST SERPL-CCNC: 21 U/L (ref 10–40)
BASOPHILS # BLD AUTO: 0.04 K/UL (ref 0–0.2)
BASOPHILS NFR BLD: 1.1 % (ref 0–1.9)
BILIRUB SERPL-MCNC: 1.2 MG/DL (ref 0.1–1)
BUN SERPL-MCNC: 15 MG/DL (ref 8–23)
CALCIUM SERPL-MCNC: 9 MG/DL (ref 8.7–10.5)
CHLORIDE SERPL-SCNC: 102 MMOL/L (ref 95–110)
CO2 SERPL-SCNC: 29 MMOL/L (ref 23–29)
CREAT SERPL-MCNC: 0.8 MG/DL (ref 0.5–1.4)
CRP SERPL-MCNC: <0.02 MG/DL (ref 0–0.75)
DIFFERENTIAL METHOD: ABNORMAL
EOSINOPHIL # BLD AUTO: 0.1 K/UL (ref 0–0.5)
EOSINOPHIL NFR BLD: 3.2 % (ref 0–8)
ERYTHROCYTE [DISTWIDTH] IN BLOOD BY AUTOMATED COUNT: 13.2 % (ref 11.5–14.5)
ERYTHROCYTE [SEDIMENTATION RATE] IN BLOOD BY WESTERGREN METHOD: 7 MM/HR (ref 0–10)
EST. GFR  (AFRICAN AMERICAN): >60 ML/MIN/1.73 M^2
EST. GFR  (NON AFRICAN AMERICAN): >60 ML/MIN/1.73 M^2
GLUCOSE SERPL-MCNC: 177 MG/DL (ref 70–110)
HCT VFR BLD AUTO: 41.5 % (ref 40–54)
HGB BLD-MCNC: 14 G/DL (ref 14–18)
IMM GRANULOCYTES # BLD AUTO: 0.02 K/UL (ref 0–0.04)
IMM GRANULOCYTES NFR BLD AUTO: 0.5 % (ref 0–0.5)
LYMPHOCYTES # BLD AUTO: 0.8 K/UL (ref 1–4.8)
LYMPHOCYTES NFR BLD: 22.1 % (ref 18–48)
MCH RBC QN AUTO: 30.4 PG (ref 27–31)
MCHC RBC AUTO-ENTMCNC: 33.7 G/DL (ref 32–36)
MCV RBC AUTO: 90 FL (ref 82–98)
MONOCYTES # BLD AUTO: 0.3 K/UL (ref 0.3–1)
MONOCYTES NFR BLD: 9.2 % (ref 4–15)
NEUTROPHILS # BLD AUTO: 2.4 K/UL (ref 1.8–7.7)
NEUTROPHILS NFR BLD: 63.9 % (ref 38–73)
NRBC BLD-RTO: 0 /100 WBC
PLATELET # BLD AUTO: 130 K/UL (ref 150–350)
PMV BLD AUTO: 11.2 FL (ref 9.2–12.9)
POTASSIUM SERPL-SCNC: 3.8 MMOL/L (ref 3.5–5.1)
PROT SERPL-MCNC: 6.5 G/DL (ref 6–8.4)
RBC # BLD AUTO: 4.6 M/UL (ref 4.6–6.2)
SODIUM SERPL-SCNC: 137 MMOL/L (ref 136–145)
WBC # BLD AUTO: 3.71 K/UL (ref 3.9–12.7)

## 2020-03-20 PROCEDURE — 1101F PT FALLS ASSESS-DOCD LE1/YR: CPT | Mod: S$GLB,,, | Performed by: ORTHOPAEDIC SURGERY

## 2020-03-20 PROCEDURE — 96365 THER/PROPH/DIAG IV INF INIT: CPT | Mod: 59

## 2020-03-20 PROCEDURE — 85025 COMPLETE CBC W/AUTO DIFF WBC: CPT

## 2020-03-20 PROCEDURE — 99213 OFFICE O/P EST LOW 20 MIN: CPT | Mod: S$GLB,,, | Performed by: ORTHOPAEDIC SURGERY

## 2020-03-20 PROCEDURE — 99211 OFF/OP EST MAY X REQ PHY/QHP: CPT | Mod: 25

## 2020-03-20 PROCEDURE — 1101F PR PT FALLS ASSESS DOC 0-1 FALLS W/OUT INJ PAST YR: ICD-10-PCS | Mod: S$GLB,,, | Performed by: ORTHOPAEDIC SURGERY

## 2020-03-20 PROCEDURE — 86140 C-REACTIVE PROTEIN: CPT

## 2020-03-20 PROCEDURE — 1126F AMNT PAIN NOTED NONE PRSNT: CPT | Mod: S$GLB,,, | Performed by: ORTHOPAEDIC SURGERY

## 2020-03-20 PROCEDURE — 1126F PR PAIN SEVERITY QUANTIFIED, NO PAIN PRESENT: ICD-10-PCS | Mod: S$GLB,,, | Performed by: ORTHOPAEDIC SURGERY

## 2020-03-20 PROCEDURE — 1159F MED LIST DOCD IN RCRD: CPT | Mod: S$GLB,,, | Performed by: ORTHOPAEDIC SURGERY

## 2020-03-20 PROCEDURE — 99999 PR PBB SHADOW E&M-EST. PATIENT-LVL III: CPT | Mod: PBBFAC,,, | Performed by: ORTHOPAEDIC SURGERY

## 2020-03-20 PROCEDURE — 99213 PR OFFICE/OUTPT VISIT, EST, LEVL III, 20-29 MIN: ICD-10-PCS | Mod: S$GLB,,, | Performed by: ORTHOPAEDIC SURGERY

## 2020-03-20 PROCEDURE — 1159F PR MEDICATION LIST DOCUMENTED IN MEDICAL RECORD: ICD-10-PCS | Mod: S$GLB,,, | Performed by: ORTHOPAEDIC SURGERY

## 2020-03-20 PROCEDURE — 85651 RBC SED RATE NONAUTOMATED: CPT

## 2020-03-20 PROCEDURE — 36591 DRAW BLOOD OFF VENOUS DEVICE: CPT

## 2020-03-20 PROCEDURE — 63600175 PHARM REV CODE 636 W HCPCS: Performed by: INTERNAL MEDICINE

## 2020-03-20 PROCEDURE — 80053 COMPREHEN METABOLIC PANEL: CPT

## 2020-03-20 PROCEDURE — 99999 PR PBB SHADOW E&M-EST. PATIENT-LVL III: ICD-10-PCS | Mod: PBBFAC,,, | Performed by: ORTHOPAEDIC SURGERY

## 2020-03-20 RX ORDER — SODIUM CHLORIDE 0.9 % (FLUSH) 0.9 %
10 SYRINGE (ML) INJECTION
Status: CANCELLED | OUTPATIENT
Start: 2020-03-27

## 2020-03-20 RX ORDER — HEPARIN SODIUM (PORCINE) LOCK FLUSH IV SOLN 100 UNIT/ML 100 UNIT/ML
300 SOLUTION INTRAVENOUS
Status: CANCELLED | OUTPATIENT
Start: 2020-03-27

## 2020-03-20 RX ADMIN — Medication 300 UNITS: at 07:03

## 2020-03-20 NOTE — PROGRESS NOTES
3/20/2020    History reviewed. No pertinent past medical history.    Past Surgical History:   Procedure Laterality Date    APPENDECTOMY      INCISION AND DRAINAGE Left 12/18/2019    Procedure: INCISION AND DRAINAGE;  Surgeon: Nick Martinez MD;  Location: [facility] OR;  Service: Orthopedics;  Laterality: Left;    REPAIR OF EXTENSOR TENDON Left 12/18/2019    Procedure: REPAIR, TENDON, EXTENSOR;  Surgeon: Nick Martinez MD;  Location: [facility] OR;  Service: Orthopedics;  Laterality: Left;  PRACHI ANGEL       Current Outpatient Medications   Medication Sig    aspirin (ECOTRIN) 81 MG EC tablet Take 1 tablet by mouth once daily.    doxycycline (VIBRAMYCIN) 100 MG Cap Take 1 capsule (100 mg total) by mouth every 12 (twelve) hours.    triamcinolone acetonide 0.1% (KENALOG) 0.1 % cream 1 application to affected area     No current facility-administered medications for this visit.      Facility-Administered Medications Ordered in Other Visits   Medication    cefTRIAXone (ROCEPHIN) 2 g in dextrose 5 % 50 mL IVPB    heparin, porcine (PF) 100 unit/mL injection flush 300 Units       Review of patient's allergies indicates:  No Known Allergies    Family History   Problem Relation Age of Onset    Diabetes Father        Social History     Socioeconomic History    Marital status: Single     Spouse name: Not on file    Number of children: Not on file    Years of education: Not on file    Highest education level: Not on file   Occupational History    Not on file   Social Needs    Financial resource strain: Not on file    Food insecurity:     Worry: Not on file     Inability: Not on file    Transportation needs:     Medical: Not on file     Non-medical: Not on file   Tobacco Use    Smoking status: Never Smoker    Smokeless tobacco: Never Used   Substance and Sexual Activity    Alcohol use: Not Currently    Drug use: Never    Sexual activity: Not on file   Lifestyle    Physical activity:     Days per  week: Not on file     Minutes per session: Not on file    Stress: Not on file   Relationships    Social connections:     Talks on phone: Not on file     Gets together: Not on file     Attends Baptism service: Not on file     Active member of club or organization: Not on file     Attends meetings of clubs or organizations: Not on file     Relationship status: Not on file   Other Topics Concern    Not on file   Social History Narrative    Not on file       Chief Complaint:   Chief Complaint   Patient presents with    Left Foot - Pain, Follow-up     DOS: 12/18/2019 -- 13wks  2 days S/P OPEN AVULSION FX DISTAL PHALANX OF DIP JOINT LEFT GREAT TOE.          History of present illness:    This is a 79 y.o. year old male who complains of patient is now 13 weeks status post open avulsion fracture of the distal phalanx of the left great toe patient developed some osteomyelitis by MRI and he is presently on antibiotics    Review of Systems:    Constitution: Denies chills, fever, and sweats.  HENT: Denies headaches or blurry vision.  Cardiovascular: Denies chest pain or irregular heart beat.  Respiratory: Denies cough or shortness of breath.  Gastrointestinal: Denies abdominal pain, nausea, or vomiting.  Musculoskeletal:  Denies muscle cramps.  Neurological: Denies dizziness or focal weakness.  Psychiatric/Behavioral: Normal mental status.  Hematologic/Lymphatic: Denies bleeding problem or easy bruising/bleeding.  Skin: Denies rash or suspicious lesions.    Examination:    Vital Signs:  There were no vitals filed for this visit.    There is no height or weight on file to calculate BMI.    This a well-developed, well nourished patient in no acute distress.    Alert and oriented x 3 and cooperative to examination.       Physical Exam:  Left foot-the dorsal wound over the foot appears to be granulating well there is no purulence or drainage there is no exposed tendon the cellulitis appears to be resolving along with the  swelling he moves the toe well    Imaging:  Patient's most recent MRI showed some evidence of osteomyelitis of the distal portion of the proximal phalanx of the left great toe       Assessment: Closed displaced fracture of distal phalanx of left great toe with routine healing, subsequent encounter        Plan:  Patient is to continue IV antibiotics and local wound care will see him back in about 3 weeks      DISCLAIMER: This note may have been dictated using voice recognition software and may contain grammatical errors.     NOTE: Consult report sent to referring provider via [employer] EMR.

## 2020-03-21 ENCOUNTER — INFUSION (OUTPATIENT)
Dept: INFUSION THERAPY | Facility: HOSPITAL | Age: 80
End: 2020-03-21
Attending: INTERNAL MEDICINE
Payer: MEDICARE

## 2020-03-21 VITALS
TEMPERATURE: 98 F | SYSTOLIC BLOOD PRESSURE: 141 MMHG | HEART RATE: 60 BPM | OXYGEN SATURATION: 97 % | RESPIRATION RATE: 16 BRPM | DIASTOLIC BLOOD PRESSURE: 98 MMHG

## 2020-03-21 DIAGNOSIS — M86.272 SUBACUTE OSTEOMYELITIS OF LEFT FOOT: Primary | ICD-10-CM

## 2020-03-21 PROCEDURE — 63600175 PHARM REV CODE 636 W HCPCS: Performed by: INTERNAL MEDICINE

## 2020-03-21 PROCEDURE — 96365 THER/PROPH/DIAG IV INF INIT: CPT

## 2020-03-21 RX ORDER — HEPARIN 100 UNIT/ML
300 SYRINGE INTRAVENOUS
Status: DISCONTINUED | OUTPATIENT
Start: 2020-03-21 | End: 2020-03-21 | Stop reason: HOSPADM

## 2020-03-21 RX ADMIN — HEPARIN 300 UNITS: 100 SYRINGE at 09:03

## 2020-03-21 RX ADMIN — CEFTRIAXONE 2 G: 2 INJECTION, SOLUTION INTRAVENOUS at 09:03

## 2020-03-22 ENCOUNTER — INFUSION (OUTPATIENT)
Dept: INFUSION THERAPY | Facility: HOSPITAL | Age: 80
End: 2020-03-22
Attending: INTERNAL MEDICINE
Payer: MEDICARE

## 2020-03-22 VITALS
DIASTOLIC BLOOD PRESSURE: 90 MMHG | TEMPERATURE: 98 F | HEART RATE: 56 BPM | SYSTOLIC BLOOD PRESSURE: 164 MMHG | RESPIRATION RATE: 16 BRPM | OXYGEN SATURATION: 99 %

## 2020-03-22 DIAGNOSIS — M86.272 SUBACUTE OSTEOMYELITIS OF LEFT FOOT: Primary | ICD-10-CM

## 2020-03-22 PROCEDURE — 63600175 PHARM REV CODE 636 W HCPCS: Performed by: INTERNAL MEDICINE

## 2020-03-22 PROCEDURE — 96365 THER/PROPH/DIAG IV INF INIT: CPT | Mod: 59

## 2020-03-22 RX ORDER — HEPARIN SODIUM (PORCINE) LOCK FLUSH IV SOLN 100 UNIT/ML 100 UNIT/ML
300 SOLUTION INTRAVENOUS
Status: CANCELLED | OUTPATIENT
Start: 2020-03-27

## 2020-03-22 RX ORDER — HEPARIN 100 UNIT/ML
300 SYRINGE INTRAVENOUS
Status: DISCONTINUED | OUTPATIENT
Start: 2020-03-22 | End: 2020-03-23 | Stop reason: HOSPADM

## 2020-03-22 RX ORDER — SODIUM CHLORIDE 0.9 % (FLUSH) 0.9 %
10 SYRINGE (ML) INJECTION
Status: CANCELLED | OUTPATIENT
Start: 2020-03-27

## 2020-03-22 RX ORDER — HEPARIN 100 UNIT/ML
300 SYRINGE INTRAVENOUS
Status: DISCONTINUED | OUTPATIENT
Start: 2020-03-22 | End: 2020-03-22 | Stop reason: HOSPADM

## 2020-03-22 RX ADMIN — Medication 300 UNITS: at 09:03

## 2020-03-22 RX ADMIN — CEFTRIAXONE 2 G: 2 INJECTION, SOLUTION INTRAVENOUS at 08:03

## 2020-03-23 ENCOUNTER — INFUSION (OUTPATIENT)
Dept: INFUSION THERAPY | Facility: HOSPITAL | Age: 80
End: 2020-03-23
Attending: INTERNAL MEDICINE
Payer: MEDICARE

## 2020-03-23 DIAGNOSIS — M86.272 SUBACUTE OSTEOMYELITIS OF LEFT FOOT: Primary | ICD-10-CM

## 2020-03-23 LAB — FUNGUS SPEC CULT: NORMAL

## 2020-03-23 PROCEDURE — 63600175 PHARM REV CODE 636 W HCPCS: Performed by: INTERNAL MEDICINE

## 2020-03-23 PROCEDURE — 96365 THER/PROPH/DIAG IV INF INIT: CPT

## 2020-03-23 RX ORDER — SODIUM CHLORIDE 0.9 % (FLUSH) 0.9 %
10 SYRINGE (ML) INJECTION
Status: CANCELLED | OUTPATIENT
Start: 2020-03-27

## 2020-03-23 RX ORDER — HEPARIN 100 UNIT/ML
300 SYRINGE INTRAVENOUS
Status: DISCONTINUED | OUTPATIENT
Start: 2020-03-24 | End: 2020-03-24 | Stop reason: HOSPADM

## 2020-03-23 RX ORDER — SODIUM CHLORIDE 0.9 % (FLUSH) 0.9 %
10 SYRINGE (ML) INJECTION
Status: DISCONTINUED | OUTPATIENT
Start: 2020-03-23 | End: 2020-03-24 | Stop reason: HOSPADM

## 2020-03-23 RX ORDER — HEPARIN SODIUM (PORCINE) LOCK FLUSH IV SOLN 100 UNIT/ML 100 UNIT/ML
300 SOLUTION INTRAVENOUS
Status: CANCELLED | OUTPATIENT
Start: 2020-03-27

## 2020-03-23 RX ADMIN — Medication 600 UNITS: at 08:03

## 2020-03-24 ENCOUNTER — INFUSION (OUTPATIENT)
Dept: INFUSION THERAPY | Facility: HOSPITAL | Age: 80
End: 2020-03-24
Attending: INTERNAL MEDICINE
Payer: MEDICARE

## 2020-03-24 ENCOUNTER — OFFICE VISIT (OUTPATIENT)
Dept: WOUND CARE | Facility: HOSPITAL | Age: 80
End: 2020-03-24
Attending: PODIATRIST
Payer: MEDICARE

## 2020-03-24 VITALS
RESPIRATION RATE: 16 BRPM | SYSTOLIC BLOOD PRESSURE: 126 MMHG | HEART RATE: 78 BPM | DIASTOLIC BLOOD PRESSURE: 84 MMHG | TEMPERATURE: 98 F

## 2020-03-24 VITALS
SYSTOLIC BLOOD PRESSURE: 150 MMHG | RESPIRATION RATE: 16 BRPM | DIASTOLIC BLOOD PRESSURE: 78 MMHG | OXYGEN SATURATION: 95 % | TEMPERATURE: 98 F | HEART RATE: 54 BPM

## 2020-03-24 DIAGNOSIS — M86.272 SUBACUTE OSTEOMYELITIS OF LEFT FOOT: Primary | ICD-10-CM

## 2020-03-24 DIAGNOSIS — L97.525 NON-PRESSURE CHRONIC ULCER OF OTHER PART OF LEFT FOOT WITH MUSCLE INVOLVEMENT WITHOUT EVIDENCE OF NECROSIS: Primary | ICD-10-CM

## 2020-03-24 PROCEDURE — 96365 THER/PROPH/DIAG IV INF INIT: CPT | Mod: 59

## 2020-03-24 PROCEDURE — 11042 PR DEBRIDEMENT, SKIN, SUB-Q TISSUE,=<20 SQ CM: ICD-10-PCS | Mod: ,,, | Performed by: PODIATRIST

## 2020-03-24 PROCEDURE — 63600175 PHARM REV CODE 636 W HCPCS: Performed by: INTERNAL MEDICINE

## 2020-03-24 PROCEDURE — 11042 DBRDMT SUBQ TIS 1ST 20SQCM/<: CPT | Performed by: PODIATRIST

## 2020-03-24 PROCEDURE — 11042 DBRDMT SUBQ TIS 1ST 20SQCM/<: CPT | Mod: ,,, | Performed by: PODIATRIST

## 2020-03-24 RX ORDER — SODIUM CHLORIDE 0.9 % (FLUSH) 0.9 %
10 SYRINGE (ML) INJECTION
Status: CANCELLED | OUTPATIENT
Start: 2020-03-27

## 2020-03-24 RX ORDER — HEPARIN SODIUM (PORCINE) LOCK FLUSH IV SOLN 100 UNIT/ML 100 UNIT/ML
300 SOLUTION INTRAVENOUS
Status: DISCONTINUED | OUTPATIENT
Start: 2020-03-24 | End: 2020-03-25 | Stop reason: HOSPADM

## 2020-03-24 RX ORDER — HEPARIN SODIUM (PORCINE) LOCK FLUSH IV SOLN 100 UNIT/ML 100 UNIT/ML
300 SOLUTION INTRAVENOUS
Status: CANCELLED | OUTPATIENT
Start: 2020-03-27

## 2020-03-24 RX ADMIN — Medication 300 UNITS: at 08:03

## 2020-03-24 NOTE — PROGRESS NOTES
[address] [facility] [address]  [place] [address]  Phone: [phone]   [phone]    Patient's PCP:Raphael Vargas MD  Referring Provider: No ref. provider found    Subjective:      Chief Complaint:: Wound Care    HPI  Kartik Hremosillo is a 79 y.o. male who presents with a complaint of a wound on his left great toe lasting for 8 weeks. Onset of the symptoms was open fracture of left great toe, which was surgically repaired by Dr. Martinez.  However, patient went on to develop a wound of the left great toe.  Current symptoms include pain and eschar.   Symptoms have increased. Treatment to date have included cleaning with normal saline and leaving it open to air, with no dressing.  He states this is what he was instructed to do.       He was referred to the [facility] Care [facility] by Dr. Martinez. He was placed on oral clindamycin at his last visit with Dr. Martinez on 2/7/20.     Today is the first time I am seeing the patient. I have reviewed his medical chart in detail.  I am concerned that he has underlying osteomyelitis of the left great toe.  I am able to palpate bone underlying the necrotic tissue on the top of his toe.  No drainage to culture.  Therefore, I will be ordering blood work and an MRI of his left foot. I discussed with him the treatment options if the MRI shows osteomyelitis of the toe.   I will include an A1c in his blood work as he has been told in the past that he is a borderline diabetic.     Until the results of the lab work and MRI are back, patient is to dress his toe consisting of Santyl, 4x4s, and gauze every day. He will also be receiving supplies I am ordering to change his dressings. Supplies will include Iodoflex and patient will begin dressing his toe using Iodoflex every other day, if he is not able to afford the santyl medication.        2/18:  Patient's wound has improved since last week.  Patient was approved for an MRI, and instructed to schedule with [facility] imaging.  My office called  him in order to schedule, but he had not returned the call.  Therefore, I had patient go directly to estimate imaging following this appointment in order to schedule his MRI.  Patient has been dressing with Iodosorb and dressing supplies.   He states he finished his oral antibiotics prescribed by Dr. Cruz this past Saturday. Patient to continue dressing with Iodosorb and dressing supplies.      2/24:  MRI came back showing osteomyelitis   1. Osteomyelitis of left great toe proximal phalangeal head.  2. Confluent low T1 bone marrow signal alteration affecting left great toe distal phalangeal base also suspicious for osteomyelitis given extent.  Bone marrow signal alteration related to healed fracture is felt less likely.  3. Negative for soft tissue abscess.  Patient referred to Infectious Disease for IV antibiotics.  Discussed patient with Dr. Field.  Bone biopsy taken by Dr. Gunner Rizvi on Friday, February 21st.  I was not able to do bone biopsy, due to being in wound clinic at the time.    Awaiting bone biopsy results.  Patient to continue Iodosorb dressing changes and follow up in 1 week. Debrided today.  Wound has improved.  Bone is still palpable.     3/3:  Patient currently receiving IV antibiotics in addition to oral antibiotics from Infectious Disease. Wound is improving.  Continue with Iodosorb.  Debrided today.     3/10:  Patient is still receiving IV antibiotics in addition to oral antibiotics from Infectious Disease.  Wound has improved again from last week.  Continue with Iodosorb.  Debrided today.  The toe itself has erythema and a mild increase in temperature.  I discussed with Dr. Alfredo who is treating patient for his osteomyelitis.  Picture in chart.  Left great toenail was removed today due to over 50% of the nail bed showing subungual hematoma.     3/17:   Wound has improved again from last week.  Continue with Iodosorb.  Debrided today.  The erythema has improved since last week.  Left great  toenail avulsion is healing normally.  Follow up in 1 week. '    3/24: Wound has improved again from last week.  Continue with Iodosorb.  Debrided today.  The erythema has improved since last week.  Left great toenail avulsion is healing normally.  Follow up in 1 week. '    Vitals:    03/24/20 1118   BP: 126/84   Pulse: 78   Resp: 16   Temp: 98.1 °F (36.7 °C)     Shoe Size:     Past Surgical History:   Procedure Laterality Date    APPENDECTOMY      INCISION AND DRAINAGE Left 12/18/2019    Procedure: INCISION AND DRAINAGE;  Surgeon: Nick Martinez MD;  Location: [facility] OR;  Service: Orthopedics;  Laterality: Left;    REPAIR OF EXTENSOR TENDON Left 12/18/2019    Procedure: REPAIR, TENDON, EXTENSOR;  Surgeon: Nick Martinez MD;  Location: [facility] OR;  Service: Orthopedics;  Laterality: Left;  PRACIH ANGEL     History reviewed. No pertinent past medical history.  Family History   Problem Relation Age of Onset    Diabetes Father         Social History:   Marital Status: Single  Alcohol History:  reports that he drank alcohol.  Tobacco History:  reports that he has never smoked. He has never used smokeless tobacco.  Drug History:  reports that he does not use drugs.    Review of patient's allergies indicates:  No Known Allergies    Current Outpatient Medications   Medication Sig Dispense Refill    aspirin (ECOTRIN) 81 MG EC tablet Take 1 tablet by mouth once daily.      doxycycline (VIBRAMYCIN) 100 MG Cap Take 1 capsule (100 mg total) by mouth every 12 (twelve) hours. 60 capsule 2    triamcinolone acetonide 0.1% (KENALOG) 0.1 % cream 1 application to affected area       No current facility-administered medications for this visit.        Review of Systems      Objective:        Physical Exam:   Foot Exam  Physical Exam  Much of the documentation for this visit was completed in the Wound Docs system.  Please see the attached documentation for further details about the patient's care. Scanned under the  Media tab.        Karis Rizvi DPM   Imaging:            Assessment:       1. 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    Procedures - Much of the documentation for this visit was completed in the Wound Docs system.  Please see the attached documentation for further details about the patient's care. Scanned under the Media tab.        Karis Rizvi DPM     Counseling:     I provided patient education verbally regarding:   Patient diagnosis, treatment options, as well as alternatives, risks, and benefits.     This note was created using Dragon voice recognition software that occasionally misinterpreted phrases or words.

## 2020-03-25 ENCOUNTER — INFUSION (OUTPATIENT)
Dept: INFUSION THERAPY | Facility: HOSPITAL | Age: 80
End: 2020-03-25
Attending: INTERNAL MEDICINE
Payer: MEDICARE

## 2020-03-25 VITALS
TEMPERATURE: 98 F | OXYGEN SATURATION: 97 % | HEART RATE: 57 BPM | DIASTOLIC BLOOD PRESSURE: 87 MMHG | SYSTOLIC BLOOD PRESSURE: 163 MMHG | RESPIRATION RATE: 12 BRPM

## 2020-03-25 DIAGNOSIS — M86.272 SUBACUTE OSTEOMYELITIS OF LEFT FOOT: Primary | ICD-10-CM

## 2020-03-25 PROCEDURE — 96365 THER/PROPH/DIAG IV INF INIT: CPT | Mod: 59

## 2020-03-25 PROCEDURE — 63600175 PHARM REV CODE 636 W HCPCS: Performed by: INTERNAL MEDICINE

## 2020-03-25 RX ORDER — HEPARIN 100 UNIT/ML
300 SYRINGE INTRAVENOUS
Status: DISCONTINUED | OUTPATIENT
Start: 2020-03-25 | End: 2020-03-26 | Stop reason: HOSPADM

## 2020-03-25 RX ORDER — HEPARIN SODIUM (PORCINE) LOCK FLUSH IV SOLN 100 UNIT/ML 100 UNIT/ML
300 SOLUTION INTRAVENOUS
Status: CANCELLED | OUTPATIENT
Start: 2020-03-27

## 2020-03-25 RX ORDER — SODIUM CHLORIDE 0.9 % (FLUSH) 0.9 %
10 SYRINGE (ML) INJECTION
Status: CANCELLED | OUTPATIENT
Start: 2020-03-27

## 2020-03-25 RX ORDER — SODIUM CHLORIDE 0.9 % (FLUSH) 0.9 %
10 SYRINGE (ML) INJECTION
Status: DISCONTINUED | OUTPATIENT
Start: 2020-03-25 | End: 2020-03-26 | Stop reason: HOSPADM

## 2020-03-25 RX ADMIN — HEPARIN 600 UNITS: 100 SYRINGE at 08:03

## 2020-03-26 ENCOUNTER — INFUSION (OUTPATIENT)
Dept: INFUSION THERAPY | Facility: HOSPITAL | Age: 80
End: 2020-03-26
Attending: INTERNAL MEDICINE
Payer: MEDICARE

## 2020-03-26 VITALS
RESPIRATION RATE: 16 BRPM | TEMPERATURE: 98 F | DIASTOLIC BLOOD PRESSURE: 81 MMHG | SYSTOLIC BLOOD PRESSURE: 143 MMHG | HEART RATE: 50 BPM | OXYGEN SATURATION: 97 %

## 2020-03-26 DIAGNOSIS — M86.272 SUBACUTE OSTEOMYELITIS OF LEFT FOOT: Primary | ICD-10-CM

## 2020-03-26 PROCEDURE — 63600175 PHARM REV CODE 636 W HCPCS: Performed by: INTERNAL MEDICINE

## 2020-03-26 PROCEDURE — 96365 THER/PROPH/DIAG IV INF INIT: CPT | Mod: 59

## 2020-03-26 RX ORDER — SODIUM CHLORIDE 0.9 % (FLUSH) 0.9 %
10 SYRINGE (ML) INJECTION
Status: CANCELLED | OUTPATIENT
Start: 2020-03-27

## 2020-03-26 RX ORDER — HEPARIN 100 UNIT/ML
300 SYRINGE INTRAVENOUS
Status: DISCONTINUED | OUTPATIENT
Start: 2020-03-26 | End: 2020-03-27 | Stop reason: HOSPADM

## 2020-03-26 RX ORDER — HEPARIN SODIUM (PORCINE) LOCK FLUSH IV SOLN 100 UNIT/ML 100 UNIT/ML
300 SOLUTION INTRAVENOUS
Status: CANCELLED | OUTPATIENT
Start: 2020-03-27

## 2020-03-26 RX ADMIN — Medication 300 UNITS: at 08:03

## 2020-03-27 ENCOUNTER — INFUSION (OUTPATIENT)
Dept: INFUSION THERAPY | Facility: HOSPITAL | Age: 80
End: 2020-03-27
Attending: INTERNAL MEDICINE
Payer: MEDICARE

## 2020-03-27 VITALS
RESPIRATION RATE: 16 BRPM | SYSTOLIC BLOOD PRESSURE: 131 MMHG | HEART RATE: 53 BPM | OXYGEN SATURATION: 96 % | TEMPERATURE: 98 F | DIASTOLIC BLOOD PRESSURE: 67 MMHG

## 2020-03-27 DIAGNOSIS — M86.272 SUBACUTE OSTEOMYELITIS OF LEFT FOOT: Primary | ICD-10-CM

## 2020-03-27 LAB
ALBUMIN SERPL BCP-MCNC: 4.1 G/DL (ref 3.5–5.2)
ALP SERPL-CCNC: 66 U/L (ref 55–135)
ALT SERPL W/O P-5'-P-CCNC: 19 U/L (ref 10–44)
ANION GAP SERPL CALC-SCNC: 10 MMOL/L (ref 8–16)
AST SERPL-CCNC: 18 U/L (ref 10–40)
BASOPHILS # BLD AUTO: 0.02 K/UL (ref 0–0.2)
BASOPHILS NFR BLD: 0.6 % (ref 0–1.9)
BILIRUB SERPL-MCNC: 1.4 MG/DL (ref 0.1–1)
BUN SERPL-MCNC: 26 MG/DL (ref 8–23)
CALCIUM SERPL-MCNC: 9.1 MG/DL (ref 8.7–10.5)
CHLORIDE SERPL-SCNC: 105 MMOL/L (ref 95–110)
CO2 SERPL-SCNC: 24 MMOL/L (ref 23–29)
CREAT SERPL-MCNC: 0.8 MG/DL (ref 0.5–1.4)
CRP SERPL-MCNC: <0.02 MG/DL (ref 0–0.75)
DIFFERENTIAL METHOD: ABNORMAL
EOSINOPHIL # BLD AUTO: 0.1 K/UL (ref 0–0.5)
EOSINOPHIL NFR BLD: 3.9 % (ref 0–8)
ERYTHROCYTE [DISTWIDTH] IN BLOOD BY AUTOMATED COUNT: 13.2 % (ref 11.5–14.5)
ERYTHROCYTE [SEDIMENTATION RATE] IN BLOOD BY WESTERGREN METHOD: 5 MM/HR (ref 0–10)
EST. GFR  (AFRICAN AMERICAN): >60 ML/MIN/1.73 M^2
EST. GFR  (NON AFRICAN AMERICAN): >60 ML/MIN/1.73 M^2
GLUCOSE SERPL-MCNC: 111 MG/DL (ref 70–110)
HCT VFR BLD AUTO: 41 % (ref 40–54)
HGB BLD-MCNC: 14 G/DL (ref 14–18)
IMM GRANULOCYTES # BLD AUTO: 0.01 K/UL (ref 0–0.04)
IMM GRANULOCYTES NFR BLD AUTO: 0.3 % (ref 0–0.5)
LYMPHOCYTES # BLD AUTO: 0.9 K/UL (ref 1–4.8)
LYMPHOCYTES NFR BLD: 25.6 % (ref 18–48)
MCH RBC QN AUTO: 30.9 PG (ref 27–31)
MCHC RBC AUTO-ENTMCNC: 34.1 G/DL (ref 32–36)
MCV RBC AUTO: 91 FL (ref 82–98)
MONOCYTES # BLD AUTO: 0.3 K/UL (ref 0.3–1)
MONOCYTES NFR BLD: 9.4 % (ref 4–15)
NEUTROPHILS # BLD AUTO: 2.2 K/UL (ref 1.8–7.7)
NEUTROPHILS NFR BLD: 60.2 % (ref 38–73)
NRBC BLD-RTO: 0 /100 WBC
PLATELET # BLD AUTO: 117 K/UL (ref 150–350)
PMV BLD AUTO: 11.5 FL (ref 9.2–12.9)
POTASSIUM SERPL-SCNC: 3.9 MMOL/L (ref 3.5–5.1)
PROT SERPL-MCNC: 6.6 G/DL (ref 6–8.4)
RBC # BLD AUTO: 4.53 M/UL (ref 4.6–6.2)
SODIUM SERPL-SCNC: 139 MMOL/L (ref 136–145)
WBC # BLD AUTO: 3.63 K/UL (ref 3.9–12.7)

## 2020-03-27 PROCEDURE — 85025 COMPLETE CBC W/AUTO DIFF WBC: CPT

## 2020-03-27 PROCEDURE — 96365 THER/PROPH/DIAG IV INF INIT: CPT

## 2020-03-27 PROCEDURE — 99211 OFF/OP EST MAY X REQ PHY/QHP: CPT | Mod: 25

## 2020-03-27 PROCEDURE — 63600175 PHARM REV CODE 636 W HCPCS: Performed by: INTERNAL MEDICINE

## 2020-03-27 PROCEDURE — 85651 RBC SED RATE NONAUTOMATED: CPT

## 2020-03-27 PROCEDURE — 80053 COMPREHEN METABOLIC PANEL: CPT

## 2020-03-27 PROCEDURE — 36591 DRAW BLOOD OFF VENOUS DEVICE: CPT

## 2020-03-27 PROCEDURE — 86140 C-REACTIVE PROTEIN: CPT

## 2020-03-27 RX ADMIN — Medication 300 UNITS: at 08:03

## 2020-03-28 ENCOUNTER — INFUSION (OUTPATIENT)
Dept: INFUSION THERAPY | Facility: HOSPITAL | Age: 80
End: 2020-03-28
Attending: INTERNAL MEDICINE
Payer: MEDICARE

## 2020-03-28 VITALS
RESPIRATION RATE: 16 BRPM | HEART RATE: 56 BPM | OXYGEN SATURATION: 97 % | DIASTOLIC BLOOD PRESSURE: 93 MMHG | TEMPERATURE: 98 F | SYSTOLIC BLOOD PRESSURE: 147 MMHG

## 2020-03-28 DIAGNOSIS — M86.272 SUBACUTE OSTEOMYELITIS OF LEFT FOOT: Primary | ICD-10-CM

## 2020-03-28 PROCEDURE — 63600175 PHARM REV CODE 636 W HCPCS: Performed by: INTERNAL MEDICINE

## 2020-03-28 PROCEDURE — 96365 THER/PROPH/DIAG IV INF INIT: CPT

## 2020-03-28 RX ORDER — HEPARIN SODIUM (PORCINE) LOCK FLUSH IV SOLN 100 UNIT/ML 100 UNIT/ML
300 SOLUTION INTRAVENOUS
Status: CANCELLED | OUTPATIENT
Start: 2020-04-03

## 2020-03-28 RX ORDER — HEPARIN SODIUM (PORCINE) LOCK FLUSH IV SOLN 100 UNIT/ML 100 UNIT/ML
300 SOLUTION INTRAVENOUS
Status: DISCONTINUED | OUTPATIENT
Start: 2020-03-28 | End: 2020-03-28 | Stop reason: HOSPADM

## 2020-03-28 RX ORDER — SODIUM CHLORIDE 0.9 % (FLUSH) 0.9 %
10 SYRINGE (ML) INJECTION
Status: CANCELLED | OUTPATIENT
Start: 2020-04-03

## 2020-03-28 RX ADMIN — CEFTRIAXONE 2 G: 2 INJECTION, SOLUTION INTRAVENOUS at 08:03

## 2020-03-28 RX ADMIN — Medication 300 UNITS: at 09:03

## 2020-03-29 ENCOUNTER — INFUSION (OUTPATIENT)
Dept: INFUSION THERAPY | Facility: HOSPITAL | Age: 80
End: 2020-03-29
Attending: INTERNAL MEDICINE
Payer: MEDICARE

## 2020-03-29 VITALS
DIASTOLIC BLOOD PRESSURE: 89 MMHG | TEMPERATURE: 98 F | RESPIRATION RATE: 16 BRPM | OXYGEN SATURATION: 98 % | HEART RATE: 57 BPM | SYSTOLIC BLOOD PRESSURE: 152 MMHG

## 2020-03-29 DIAGNOSIS — M86.272 SUBACUTE OSTEOMYELITIS OF LEFT FOOT: Primary | ICD-10-CM

## 2020-03-29 PROCEDURE — 96365 THER/PROPH/DIAG IV INF INIT: CPT | Mod: 59

## 2020-03-29 PROCEDURE — 63600175 PHARM REV CODE 636 W HCPCS: Performed by: INTERNAL MEDICINE

## 2020-03-29 RX ORDER — HEPARIN SODIUM (PORCINE) LOCK FLUSH IV SOLN 100 UNIT/ML 100 UNIT/ML
300 SOLUTION INTRAVENOUS
Status: DISCONTINUED | OUTPATIENT
Start: 2020-03-29 | End: 2020-03-29 | Stop reason: HOSPADM

## 2020-03-29 RX ORDER — SODIUM CHLORIDE 0.9 % (FLUSH) 0.9 %
10 SYRINGE (ML) INJECTION
Status: CANCELLED | OUTPATIENT
Start: 2020-04-03

## 2020-03-29 RX ORDER — HEPARIN SODIUM (PORCINE) LOCK FLUSH IV SOLN 100 UNIT/ML 100 UNIT/ML
300 SOLUTION INTRAVENOUS
Status: CANCELLED | OUTPATIENT
Start: 2020-04-03

## 2020-03-29 RX ADMIN — Medication 300 UNITS: at 09:03

## 2020-03-29 RX ADMIN — CEFTRIAXONE 2 G: 2 INJECTION, SOLUTION INTRAVENOUS at 08:03

## 2020-03-30 ENCOUNTER — INFUSION (OUTPATIENT)
Dept: INFUSION THERAPY | Facility: HOSPITAL | Age: 80
End: 2020-03-30
Attending: INTERNAL MEDICINE
Payer: MEDICARE

## 2020-03-30 VITALS
OXYGEN SATURATION: 97 % | HEART RATE: 54 BPM | RESPIRATION RATE: 16 BRPM | SYSTOLIC BLOOD PRESSURE: 146 MMHG | DIASTOLIC BLOOD PRESSURE: 81 MMHG | TEMPERATURE: 98 F

## 2020-03-30 DIAGNOSIS — M86.272 SUBACUTE OSTEOMYELITIS OF LEFT FOOT: Primary | ICD-10-CM

## 2020-03-30 PROCEDURE — 96365 THER/PROPH/DIAG IV INF INIT: CPT

## 2020-03-30 PROCEDURE — 25000003 PHARM REV CODE 250: Performed by: INTERNAL MEDICINE

## 2020-03-30 PROCEDURE — A4216 STERILE WATER/SALINE, 10 ML: HCPCS | Performed by: INTERNAL MEDICINE

## 2020-03-30 PROCEDURE — 63600175 PHARM REV CODE 636 W HCPCS: Performed by: INTERNAL MEDICINE

## 2020-03-30 RX ORDER — HEPARIN SODIUM (PORCINE) LOCK FLUSH IV SOLN 100 UNIT/ML 100 UNIT/ML
300 SOLUTION INTRAVENOUS
Status: CANCELLED | OUTPATIENT
Start: 2020-04-03

## 2020-03-30 RX ORDER — SODIUM CHLORIDE 0.9 % (FLUSH) 0.9 %
10 SYRINGE (ML) INJECTION
Status: CANCELLED | OUTPATIENT
Start: 2020-04-03

## 2020-03-30 RX ORDER — HEPARIN 100 UNIT/ML
300 SYRINGE INTRAVENOUS
Status: DISCONTINUED | OUTPATIENT
Start: 2020-03-30 | End: 2020-03-30 | Stop reason: HOSPADM

## 2020-03-30 RX ORDER — SODIUM CHLORIDE 0.9 % (FLUSH) 0.9 %
10 SYRINGE (ML) INJECTION
Status: DISCONTINUED | OUTPATIENT
Start: 2020-03-30 | End: 2020-03-30 | Stop reason: HOSPADM

## 2020-03-30 RX ADMIN — SODIUM CHLORIDE, PRESERVATIVE FREE 10 ML: 5 INJECTION INTRAVENOUS at 08:03

## 2020-03-30 RX ADMIN — Medication 300 UNITS: at 08:03

## 2020-03-30 RX ADMIN — SODIUM CHLORIDE, PRESERVATIVE FREE 10 ML: 5 INJECTION INTRAVENOUS at 07:03

## 2020-03-31 ENCOUNTER — OFFICE VISIT (OUTPATIENT)
Dept: WOUND CARE | Facility: HOSPITAL | Age: 80
End: 2020-03-31
Attending: PODIATRIST
Payer: MEDICARE

## 2020-03-31 ENCOUNTER — INFUSION (OUTPATIENT)
Dept: INFUSION THERAPY | Facility: HOSPITAL | Age: 80
End: 2020-03-31
Attending: INTERNAL MEDICINE
Payer: MEDICARE

## 2020-03-31 VITALS
TEMPERATURE: 98 F | SYSTOLIC BLOOD PRESSURE: 122 MMHG | HEART RATE: 86 BPM | RESPIRATION RATE: 16 BRPM | DIASTOLIC BLOOD PRESSURE: 78 MMHG

## 2020-03-31 VITALS
SYSTOLIC BLOOD PRESSURE: 147 MMHG | RESPIRATION RATE: 16 BRPM | TEMPERATURE: 98 F | HEART RATE: 56 BPM | DIASTOLIC BLOOD PRESSURE: 80 MMHG | OXYGEN SATURATION: 95 %

## 2020-03-31 DIAGNOSIS — L97.525 NON-PRESSURE CHRONIC ULCER OF OTHER PART OF LEFT FOOT WITH MUSCLE INVOLVEMENT WITHOUT EVIDENCE OF NECROSIS: Primary | ICD-10-CM

## 2020-03-31 DIAGNOSIS — M86.272 SUBACUTE OSTEOMYELITIS OF LEFT FOOT: Primary | ICD-10-CM

## 2020-03-31 PROCEDURE — 63600175 PHARM REV CODE 636 W HCPCS: Performed by: INTERNAL MEDICINE

## 2020-03-31 PROCEDURE — A4216 STERILE WATER/SALINE, 10 ML: HCPCS | Performed by: INTERNAL MEDICINE

## 2020-03-31 PROCEDURE — 11042 DBRDMT SUBQ TIS 1ST 20SQCM/<: CPT | Mod: ,,, | Performed by: PODIATRIST

## 2020-03-31 PROCEDURE — 11042 DBRDMT SUBQ TIS 1ST 20SQCM/<: CPT | Performed by: PODIATRIST

## 2020-03-31 PROCEDURE — 11042 PR DEBRIDEMENT, SKIN, SUB-Q TISSUE,=<20 SQ CM: ICD-10-PCS | Mod: ,,, | Performed by: PODIATRIST

## 2020-03-31 PROCEDURE — 25000003 PHARM REV CODE 250: Performed by: INTERNAL MEDICINE

## 2020-03-31 PROCEDURE — 96365 THER/PROPH/DIAG IV INF INIT: CPT

## 2020-03-31 RX ORDER — SODIUM CHLORIDE 0.9 % (FLUSH) 0.9 %
10 SYRINGE (ML) INJECTION
Status: CANCELLED | OUTPATIENT
Start: 2020-04-03

## 2020-03-31 RX ORDER — HEPARIN 100 UNIT/ML
300 SYRINGE INTRAVENOUS
Status: DISCONTINUED | OUTPATIENT
Start: 2020-03-31 | End: 2020-03-31 | Stop reason: HOSPADM

## 2020-03-31 RX ORDER — HEPARIN SODIUM (PORCINE) LOCK FLUSH IV SOLN 100 UNIT/ML 100 UNIT/ML
300 SOLUTION INTRAVENOUS
Status: CANCELLED | OUTPATIENT
Start: 2020-04-03

## 2020-03-31 RX ORDER — SODIUM CHLORIDE 0.9 % (FLUSH) 0.9 %
10 SYRINGE (ML) INJECTION
Status: DISCONTINUED | OUTPATIENT
Start: 2020-03-31 | End: 2020-04-01 | Stop reason: HOSPADM

## 2020-03-31 RX ORDER — SODIUM CHLORIDE 0.9 % (FLUSH) 0.9 %
10 SYRINGE (ML) INJECTION
Status: DISCONTINUED | OUTPATIENT
Start: 2020-03-31 | End: 2020-03-31 | Stop reason: HOSPADM

## 2020-03-31 RX ORDER — HEPARIN 100 UNIT/ML
300 SYRINGE INTRAVENOUS
Status: DISCONTINUED | OUTPATIENT
Start: 2020-03-31 | End: 2020-04-01 | Stop reason: HOSPADM

## 2020-03-31 RX ADMIN — HEPARIN 300 UNITS: 100 SYRINGE at 08:03

## 2020-03-31 RX ADMIN — SODIUM CHLORIDE, PRESERVATIVE FREE 10 ML: 5 INJECTION INTRAVENOUS at 08:03

## 2020-03-31 NOTE — PROGRESS NOTES
[address] [facility] [address]  [place] [address]  Phone: [phone]   [phone]    Patient's PCP:Raphael Vargas MD  Referring Provider: No ref. provider found    Subjective:      Chief Complaint:: Wound Care    HPI  Kartik Hermosillo is a 79 y.o. male who presents with a complaint of a wound on his left great toe lasting for 8 weeks. Onset of the symptoms was open fracture of left great toe, which was surgically repaired by Dr. Martinez.  However, patient went on to develop a wound of the left great toe.  Current symptoms include pain and eschar.   Symptoms have increased. Treatment to date have included cleaning with normal saline and leaving it open to air, with no dressing.  He states this is what he was instructed to do.       He was referred to the wound Care Center by Dr. Martinez. He was placed on oral clindamycin at his last visit with Dr. Martinez on 2/7/20.     Today is the first time I am seeing the patient. I have reviewed his medical chart in detail.  I am concerned that he has underlying osteomyelitis of the left great toe.  I am able to palpate bone underlying the necrotic tissue on the top of his toe.  No drainage to culture.  Therefore, I will be ordering blood work and an MRI of his left foot. I discussed with him the treatment options if the MRI shows osteomyelitis of the toe.   I will include an A1c in his blood work as he has been told in the past that he is a borderline diabetic.     Until the results of the lab work and MRI are back, patient is to dress his toe consisting of Santyl, 4x4s, and gauze every day. He will also be receiving supplies I am ordering to change his dressings. Supplies will include Iodoflex and patient will begin dressing his toe using Iodoflex every other day, if he is not able to afford the santyl medication.        2/18:  Patient's wound has improved since last week.  Patient was approved for an MRI, and instructed to schedule with [facility] imaging.  My office called  him in order to schedule, but he had not returned the call.  Therefore, I had patient go directly to estimate imaging following this appointment in order to schedule his MRI.  Patient has been dressing with Iodosorb and dressing supplies.   He states he finished his oral antibiotics prescribed by Dr. Cruz this past Saturday. Patient to continue dressing with Iodosorb and dressing supplies.      2/24:  MRI came back showing osteomyelitis   1. Osteomyelitis of left great toe proximal phalangeal head.  2. Confluent low T1 bone marrow signal alteration affecting left great toe distal phalangeal base also suspicious for osteomyelitis given extent.  Bone marrow signal alteration related to healed fracture is felt less likely.  3. Negative for soft tissue abscess.  Patient referred to Infectious Disease for IV antibiotics.  Discussed patient with Dr. Field.  Bone biopsy taken by Dr. Gunner Rizvi on Friday, February 21st.  I was not able to do bone biopsy, due to being in wound clinic at the time.    Awaiting bone biopsy results.  Patient to continue Iodosorb dressing changes and follow up in 1 week. Debrided today.  Wound has improved.  Bone is still palpable.     3/3:  Patient currently receiving IV antibiotics in addition to oral antibiotics from Infectious Disease. Wound is improving.  Continue with Iodosorb.  Debrided today.     3/10:  Patient is still receiving IV antibiotics in addition to oral antibiotics from Infectious Disease.  Wound has improved again from last week.  Continue with Iodosorb.  Debrided today.  The toe itself has erythema and a mild increase in temperature.  I discussed with Dr. Alfredo who is treating patient for his osteomyelitis.  Picture in chart.  Left great toenail was removed today due to over 50% of the nail bed showing subungual hematoma.     3/17:   Wound has improved again from last week.  Continue with Iodosorb.  Debrided today.  The erythema has improved since last week.  Left great  toenail avulsion is healing normally.  Follow up in 1 week. '     3/24: Wound has improved again from last week.  Continue with Iodosorb.  Debrided today.  The erythema has improved since last week.  Left great toenail avulsion is healing normally.  Follow up in 1 week. '    3/31:  Wound is almost closed. Continue with Iodosorb.  Debrided today.  The erythema has improved since last week.  Left great toenail avulsion is healing normally.  Follow up in 1 week. '       Vitals:    03/31/20 1023   BP: 122/78   Pulse: 86   Resp: 16   Temp: 97.8 °F (36.6 °C)     Shoe Size:     Past Surgical History:   Procedure Laterality Date    APPENDECTOMY      INCISION AND DRAINAGE Left 12/18/2019    Procedure: INCISION AND DRAINAGE;  Surgeon: Nick Martinez MD;  Location: [facility];  Service: Orthopedics;  Laterality: Left;    REPAIR OF EXTENSOR TENDON Left 12/18/2019    Procedure: REPAIR, TENDON, EXTENSOR;  Surgeon: Nick Martinez MD;  Location: [facility];  Service: Orthopedics;  Laterality: Left;  STANLEY ANA PAULALAKHWINDER NESBITT     History reviewed. No pertinent past medical history.  Family History   Problem Relation Age of Onset    Diabetes Father         Social History:   Marital Status: Single  Alcohol History:  reports that he drank alcohol.  Tobacco History:  reports that he has never smoked. He has never used smokeless tobacco.  Drug History:  reports that he does not use drugs.    Review of patient's allergies indicates:  No Known Allergies    Current Outpatient Medications   Medication Sig Dispense Refill    aspirin (ECOTRIN) 81 MG EC tablet Take 1 tablet by mouth once daily.      doxycycline (VIBRAMYCIN) 100 MG Cap Take 1 capsule (100 mg total) by mouth every 12 (twelve) hours. 60 capsule 2    triamcinolone acetonide 0.1% (KENALOG) 0.1 % cream 1 application to affected area       No current facility-administered medications for this visit.        Review of Systems      Objective:        Physical Exam:   Foot  Exam  Physical Exam  Much of the documentation for this visit was completed in the Wound Docs system.  Please see the attached documentation for further details about the patient's care. Scanned under the Media tab.        Karis Rizvi DPM   Imaging:            Assessment:       1. 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    Procedures - Much of the documentation for this visit was completed in the Wound Docs system.  Please see the attached documentation for further details about the patient's care. Scanned under the Media tab.        Karis Rizvi DPM     Counseling:     I provided patient education verbally regarding:   Patient diagnosis, treatment options, as well as alternatives, risks, and benefits.     This note was created using Dragon voice recognition software that occasionally misinterpreted phrases or words.

## 2020-04-01 ENCOUNTER — INFUSION (OUTPATIENT)
Dept: INFUSION THERAPY | Facility: HOSPITAL | Age: 80
End: 2020-04-01
Attending: INTERNAL MEDICINE
Payer: MEDICARE

## 2020-04-01 VITALS
SYSTOLIC BLOOD PRESSURE: 144 MMHG | DIASTOLIC BLOOD PRESSURE: 81 MMHG | TEMPERATURE: 98 F | OXYGEN SATURATION: 97 % | RESPIRATION RATE: 16 BRPM | HEART RATE: 52 BPM

## 2020-04-01 DIAGNOSIS — M86.272 SUBACUTE OSTEOMYELITIS OF LEFT FOOT: Primary | ICD-10-CM

## 2020-04-01 PROCEDURE — 63600175 PHARM REV CODE 636 W HCPCS: Performed by: INTERNAL MEDICINE

## 2020-04-01 PROCEDURE — 96365 THER/PROPH/DIAG IV INF INIT: CPT

## 2020-04-01 RX ORDER — HEPARIN SODIUM (PORCINE) LOCK FLUSH IV SOLN 100 UNIT/ML 100 UNIT/ML
300 SOLUTION INTRAVENOUS
Status: CANCELLED | OUTPATIENT
Start: 2020-04-03

## 2020-04-01 RX ORDER — SODIUM CHLORIDE 0.9 % (FLUSH) 0.9 %
10 SYRINGE (ML) INJECTION
Status: CANCELLED | OUTPATIENT
Start: 2020-04-03

## 2020-04-01 RX ADMIN — Medication 300 UNITS: at 08:04

## 2020-04-01 NOTE — PROGRESS NOTES
Pt given antibiotics as ordered.  Tolerated well.  Ports flushed without problems.  Pt will return tomorrow for infusion.

## 2020-04-02 ENCOUNTER — INFUSION (OUTPATIENT)
Dept: INFUSION THERAPY | Facility: HOSPITAL | Age: 80
End: 2020-04-02
Attending: FAMILY MEDICINE
Payer: MEDICARE

## 2020-04-02 VITALS
RESPIRATION RATE: 16 BRPM | OXYGEN SATURATION: 96 % | TEMPERATURE: 98 F | DIASTOLIC BLOOD PRESSURE: 78 MMHG | SYSTOLIC BLOOD PRESSURE: 132 MMHG | HEART RATE: 58 BPM

## 2020-04-02 DIAGNOSIS — M86.272 SUBACUTE OSTEOMYELITIS OF LEFT FOOT: Primary | ICD-10-CM

## 2020-04-02 PROCEDURE — 96365 THER/PROPH/DIAG IV INF INIT: CPT

## 2020-04-02 PROCEDURE — 63600175 PHARM REV CODE 636 W HCPCS: Performed by: INTERNAL MEDICINE

## 2020-04-02 RX ORDER — HEPARIN 100 UNIT/ML
300 SYRINGE INTRAVENOUS
Status: DISCONTINUED | OUTPATIENT
Start: 2020-04-02 | End: 2020-04-02 | Stop reason: HOSPADM

## 2020-04-02 RX ORDER — SODIUM CHLORIDE 0.9 % (FLUSH) 0.9 %
10 SYRINGE (ML) INJECTION
Status: DISCONTINUED | OUTPATIENT
Start: 2020-04-02 | End: 2020-04-02 | Stop reason: HOSPADM

## 2020-04-02 RX ADMIN — Medication 300 UNITS: at 08:04

## 2020-04-04 ENCOUNTER — INFUSION (OUTPATIENT)
Dept: INFUSION THERAPY | Facility: HOSPITAL | Age: 80
End: 2020-04-04
Attending: INTERNAL MEDICINE
Payer: MEDICARE

## 2020-04-04 VITALS
TEMPERATURE: 98 F | SYSTOLIC BLOOD PRESSURE: 167 MMHG | RESPIRATION RATE: 16 BRPM | HEART RATE: 60 BPM | OXYGEN SATURATION: 98 % | DIASTOLIC BLOOD PRESSURE: 84 MMHG

## 2020-04-04 DIAGNOSIS — M86.272 SUBACUTE OSTEOMYELITIS OF LEFT FOOT: Primary | ICD-10-CM

## 2020-04-04 PROCEDURE — 96365 THER/PROPH/DIAG IV INF INIT: CPT | Mod: 59

## 2020-04-04 PROCEDURE — 63600175 PHARM REV CODE 636 W HCPCS: Performed by: INTERNAL MEDICINE

## 2020-04-04 RX ORDER — SODIUM CHLORIDE 0.9 % (FLUSH) 0.9 %
10 SYRINGE (ML) INJECTION
Status: CANCELLED | OUTPATIENT
Start: 2020-04-10

## 2020-04-04 RX ORDER — HEPARIN 100 UNIT/ML
300 SYRINGE INTRAVENOUS
Status: DISCONTINUED | OUTPATIENT
Start: 2020-04-04 | End: 2020-04-04 | Stop reason: HOSPADM

## 2020-04-04 RX ORDER — HEPARIN SODIUM (PORCINE) LOCK FLUSH IV SOLN 100 UNIT/ML 100 UNIT/ML
300 SOLUTION INTRAVENOUS
Status: CANCELLED | OUTPATIENT
Start: 2020-04-10

## 2020-04-04 RX ADMIN — HEPARIN 300 UNITS: 100 SYRINGE at 09:04

## 2020-04-04 RX ADMIN — CEFTRIAXONE 2 G: 2 INJECTION, SOLUTION INTRAVENOUS at 09:04

## 2020-04-05 ENCOUNTER — INFUSION (OUTPATIENT)
Dept: INFUSION THERAPY | Facility: HOSPITAL | Age: 80
End: 2020-04-05
Attending: INTERNAL MEDICINE
Payer: MEDICARE

## 2020-04-05 VITALS
SYSTOLIC BLOOD PRESSURE: 145 MMHG | OXYGEN SATURATION: 99 % | RESPIRATION RATE: 16 BRPM | DIASTOLIC BLOOD PRESSURE: 82 MMHG | HEART RATE: 52 BPM | TEMPERATURE: 98 F

## 2020-04-05 DIAGNOSIS — M86.272 SUBACUTE OSTEOMYELITIS OF LEFT FOOT: Primary | ICD-10-CM

## 2020-04-05 PROCEDURE — 63600175 PHARM REV CODE 636 W HCPCS: Performed by: INTERNAL MEDICINE

## 2020-04-05 PROCEDURE — 96365 THER/PROPH/DIAG IV INF INIT: CPT | Mod: 59

## 2020-04-05 RX ORDER — HEPARIN 100 UNIT/ML
300 SYRINGE INTRAVENOUS
Status: DISCONTINUED | OUTPATIENT
Start: 2020-04-05 | End: 2020-04-05 | Stop reason: HOSPADM

## 2020-04-05 RX ORDER — SODIUM CHLORIDE 0.9 % (FLUSH) 0.9 %
10 SYRINGE (ML) INJECTION
Status: CANCELLED | OUTPATIENT
Start: 2020-04-10

## 2020-04-05 RX ORDER — HEPARIN SODIUM (PORCINE) LOCK FLUSH IV SOLN 100 UNIT/ML 100 UNIT/ML
300 SOLUTION INTRAVENOUS
Status: CANCELLED | OUTPATIENT
Start: 2020-04-10

## 2020-04-05 RX ADMIN — Medication 300 UNITS: at 09:04

## 2020-04-05 RX ADMIN — CEFTRIAXONE 2 G: 2 INJECTION, SOLUTION INTRAVENOUS at 08:04

## 2020-04-06 ENCOUNTER — INFUSION (OUTPATIENT)
Dept: INFUSION THERAPY | Facility: HOSPITAL | Age: 80
End: 2020-04-06
Attending: INTERNAL MEDICINE
Payer: MEDICARE

## 2020-04-06 VITALS
HEART RATE: 58 BPM | OXYGEN SATURATION: 96 % | TEMPERATURE: 98 F | DIASTOLIC BLOOD PRESSURE: 84 MMHG | SYSTOLIC BLOOD PRESSURE: 143 MMHG | RESPIRATION RATE: 18 BRPM

## 2020-04-06 DIAGNOSIS — M86.272 SUBACUTE OSTEOMYELITIS OF LEFT FOOT: Primary | ICD-10-CM

## 2020-04-06 PROCEDURE — 96374 THER/PROPH/DIAG INJ IV PUSH: CPT

## 2020-04-06 PROCEDURE — 63600175 PHARM REV CODE 636 W HCPCS: Performed by: INTERNAL MEDICINE

## 2020-04-06 RX ORDER — HEPARIN SODIUM (PORCINE) LOCK FLUSH IV SOLN 100 UNIT/ML 100 UNIT/ML
300 SOLUTION INTRAVENOUS
Status: CANCELLED | OUTPATIENT
Start: 2020-04-10

## 2020-04-06 RX ORDER — HEPARIN SODIUM (PORCINE) LOCK FLUSH IV SOLN 100 UNIT/ML 100 UNIT/ML
300 SOLUTION INTRAVENOUS
Status: DISCONTINUED | OUTPATIENT
Start: 2020-04-06 | End: 2020-04-06 | Stop reason: HOSPADM

## 2020-04-06 RX ORDER — SODIUM CHLORIDE 0.9 % (FLUSH) 0.9 %
10 SYRINGE (ML) INJECTION
Status: CANCELLED | OUTPATIENT
Start: 2020-04-10

## 2020-04-06 RX ADMIN — Medication 300 UNITS: at 08:04

## 2020-04-07 ENCOUNTER — INFUSION (OUTPATIENT)
Dept: INFUSION THERAPY | Facility: HOSPITAL | Age: 80
End: 2020-04-07
Attending: INTERNAL MEDICINE
Payer: MEDICARE

## 2020-04-07 ENCOUNTER — OFFICE VISIT (OUTPATIENT)
Dept: WOUND CARE | Facility: HOSPITAL | Age: 80
End: 2020-04-07
Attending: PODIATRIST
Payer: MEDICARE

## 2020-04-07 VITALS
RESPIRATION RATE: 16 BRPM | SYSTOLIC BLOOD PRESSURE: 138 MMHG | OXYGEN SATURATION: 97 % | TEMPERATURE: 98 F | HEART RATE: 55 BPM | DIASTOLIC BLOOD PRESSURE: 86 MMHG

## 2020-04-07 VITALS
HEART RATE: 78 BPM | DIASTOLIC BLOOD PRESSURE: 70 MMHG | RESPIRATION RATE: 17 BRPM | TEMPERATURE: 98 F | SYSTOLIC BLOOD PRESSURE: 126 MMHG

## 2020-04-07 DIAGNOSIS — L97.525 NON-PRESSURE CHRONIC ULCER OF OTHER PART OF LEFT FOOT WITH MUSCLE INVOLVEMENT WITHOUT EVIDENCE OF NECROSIS: Primary | ICD-10-CM

## 2020-04-07 DIAGNOSIS — M86.272 SUBACUTE OSTEOMYELITIS OF LEFT FOOT: Primary | ICD-10-CM

## 2020-04-07 PROCEDURE — 11042 DBRDMT SUBQ TIS 1ST 20SQCM/<: CPT | Performed by: PODIATRIST

## 2020-04-07 PROCEDURE — 11042 DBRDMT SUBQ TIS 1ST 20SQCM/<: CPT | Mod: ,,, | Performed by: PODIATRIST

## 2020-04-07 PROCEDURE — 96365 THER/PROPH/DIAG IV INF INIT: CPT

## 2020-04-07 PROCEDURE — 11042 PR DEBRIDEMENT, SKIN, SUB-Q TISSUE,=<20 SQ CM: ICD-10-PCS | Mod: ,,, | Performed by: PODIATRIST

## 2020-04-07 PROCEDURE — 63600175 PHARM REV CODE 636 W HCPCS: Performed by: INTERNAL MEDICINE

## 2020-04-07 RX ORDER — HEPARIN SODIUM (PORCINE) LOCK FLUSH IV SOLN 100 UNIT/ML 100 UNIT/ML
300 SOLUTION INTRAVENOUS
Status: CANCELLED | OUTPATIENT
Start: 2020-04-10

## 2020-04-07 RX ORDER — SODIUM CHLORIDE 0.9 % (FLUSH) 0.9 %
10 SYRINGE (ML) INJECTION
Status: CANCELLED | OUTPATIENT
Start: 2020-04-10

## 2020-04-07 RX ORDER — SODIUM CHLORIDE 0.9 % (FLUSH) 0.9 %
10 SYRINGE (ML) INJECTION
Status: DISCONTINUED | OUTPATIENT
Start: 2020-04-07 | End: 2020-04-07 | Stop reason: HOSPADM

## 2020-04-07 RX ORDER — HEPARIN 100 UNIT/ML
300 SYRINGE INTRAVENOUS
Status: DISCONTINUED | OUTPATIENT
Start: 2020-04-07 | End: 2020-04-07 | Stop reason: HOSPADM

## 2020-04-07 RX ORDER — HEPARIN SODIUM (PORCINE) LOCK FLUSH IV SOLN 100 UNIT/ML 100 UNIT/ML
300 SOLUTION INTRAVENOUS
Status: DISCONTINUED | OUTPATIENT
Start: 2020-04-08 | End: 2020-04-08 | Stop reason: HOSPADM

## 2020-04-07 RX ADMIN — Medication 600 UNITS: at 08:04

## 2020-04-07 NOTE — PROGRESS NOTES
[address] [facility] [address]  [place] [address]  Phone: [phone]   [phone]    Patient's PCP:Raphael Vargas MD  Referring Provider: No ref. provider found    Subjective:      Chief Complaint:: Wound Care    HPI  Kartik Hermosillo is a 79 y.o. male who presents with a complaint of a wound on his left great toe lasting for 8 weeks. Onset of the symptoms was open fracture of left great toe, which was surgically repaired by Dr. Martinez.  However, patient went on to develop a wound of the left great toe.  Current symptoms include pain and eschar.   Symptoms have increased. Treatment to date have included cleaning with normal saline and leaving it open to air, with no dressing.  He states this is what he was instructed to do.       He was referred to the wound Care Center by Dr. Martinez. He was placed on oral clindamycin at his last visit with Dr. Martinez on 2/7/20.     Today is the first time I am seeing the patient. I have reviewed his medical chart in detail.  I am concerned that he has underlying osteomyelitis of the left great toe.  I am able to palpate bone underlying the necrotic tissue on the top of his toe.  No drainage to culture.  Therefore, I will be ordering blood work and an MRI of his left foot. I discussed with him the treatment options if the MRI shows osteomyelitis of the toe.   I will include an A1c in his blood work as he has been told in the past that he is a borderline diabetic.     Until the results of the lab work and MRI are back, patient is to dress his toe consisting of Santyl, 4x4s, and gauze every day. He will also be receiving supplies I am ordering to change his dressings. Supplies will include Iodoflex and patient will begin dressing his toe using Iodoflex every other day, if he is not able to afford the santyl medication.        2/18:  Patient's wound has improved since last week.  Patient was approved for an MRI, and instructed to schedule with [facility] imaging.  My office called  him in order to schedule, but he had not returned the call.  Therefore, I had patient go directly to estimate imaging following this appointment in order to schedule his MRI.  Patient has been dressing with Iodosorb and dressing supplies.   He states he finished his oral antibiotics prescribed by Dr. Cruz this past Saturday. Patient to continue dressing with Iodosorb and dressing supplies.      2/24:  MRI came back showing osteomyelitis   1. Osteomyelitis of left great toe proximal phalangeal head.  2. Confluent low T1 bone marrow signal alteration affecting left great toe distal phalangeal base also suspicious for osteomyelitis given extent.  Bone marrow signal alteration related to healed fracture is felt less likely.  3. Negative for soft tissue abscess.  Patient referred to Infectious Disease for IV antibiotics.  Discussed patient with Dr. Field.  Bone biopsy taken by Dr. Gunner Rizvi on Friday, February 21st.  I was not able to do bone biopsy, due to being in wound clinic at the time.    Awaiting bone biopsy results.  Patient to continue Iodosorb dressing changes and follow up in 1 week. Debrided today.  Wound has improved.  Bone is still palpable.     3/3:  Patient currently receiving IV antibiotics in addition to oral antibiotics from Infectious Disease. Wound is improving.  Continue with Iodosorb.  Debrided today.     3/10:  Patient is still receiving IV antibiotics in addition to oral antibiotics from Infectious Disease.  Wound has improved again from last week.  Continue with Iodosorb.  Debrided today.  The toe itself has erythema and a mild increase in temperature.  I discussed with Dr. Alfredo who is treating patient for his osteomyelitis.  Picture in chart.  Left great toenail was removed today due to over 50% of the nail bed showing subungual hematoma.     3/17:   Wound has improved again from last week.  Continue with Iodosorb.  Debrided today.  The erythema has improved since last week.  Left great  toenail avulsion is healing normally.  Follow up in 1 week. '     3/24: Wound has improved again from last week.  Continue with Iodosorb.  Debrided today.  The erythema has improved since last week.  Left great toenail avulsion is healing normally.  Follow up in 1 week. '     3/31:  Wound is almost closed. Continue with Iodosorb.  Debrided today.  The erythema has improved since last week.  Left great toenail avulsion is healing normally.  Follow up in 1 week.     4/7: Wound has improved. Continue with Iodosorb.  Debrided today.  The erythema has improved since last week.  Left great toenail avulsion is healing normally.  Follow up in 1 week.     Vitals:    04/07/20 1018   BP: 126/70   Pulse: 78   Resp: 17   Temp: 97.7 °F (36.5 °C)     Shoe Size:     Past Surgical History:   Procedure Laterality Date    APPENDECTOMY      INCISION AND DRAINAGE Left 12/18/2019    Procedure: INCISION AND DRAINAGE;  Surgeon: Nick Martinez MD;  Location: [facility] OR;  Service: Orthopedics;  Laterality: Left;    REPAIR OF EXTENSOR TENDON Left 12/18/2019    Procedure: REPAIR, TENDON, EXTENSOR;  Surgeon: Nick Martinez MD;  Location: [facility] OR;  Service: Orthopedics;  Laterality: Left;  PRACHI ANGEL     History reviewed. No pertinent past medical history.  Family History   Problem Relation Age of Onset    Diabetes Father         Social History:   Marital Status: Single  Alcohol History:  reports that he drank alcohol.  Tobacco History:  reports that he has never smoked. He has never used smokeless tobacco.  Drug History:  reports that he does not use drugs.    Review of patient's allergies indicates:  No Known Allergies    Current Outpatient Medications   Medication Sig Dispense Refill    aspirin (ECOTRIN) 81 MG EC tablet Take 1 tablet by mouth once daily.      doxycycline (VIBRAMYCIN) 100 MG Cap Take 1 capsule (100 mg total) by mouth every 12 (twelve) hours. 60 capsule 2    triamcinolone acetonide 0.1% (KENALOG) 0.1 %  cream 1 application to affected area       No current facility-administered medications for this visit.      Facility-Administered Medications Ordered in Other Visits   Medication Dose Route Frequency Provider Last Rate Last Dose    cefTRIAXone (ROCEPHIN) 2 g in dextrose 5 % 50 mL IVPB  2 g Intravenous Daily Zuly Alfredo MD   2 g at 04/03/20 0754    heparin, porcine (PF) 100 unit/mL injection flush 300 Units  300 Units Intravenous PRN Zuly Alfredo MD   300 Units at 04/03/20 0755    sodium chloride 0.9% flush 10 mL  10 mL Intravenous PRN Zuly Alfredo MD           Review of Systems      Objective:        Physical Exam:   Foot Exam  Physical Exam  Much of the documentation for this visit was completed in the Wound Docs system.  Please see the attached documentation for further details about the patient's care. Scanned under the Media tab.        Karis Rizvi DPM   Imaging:            Assessment:       1. 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    Procedures - Much of the documentation for this visit was completed in the Wound Docs system.  Please see the attached documentation for further details about the patient's care. Scanned under the Media tab.        Karis Rizvi DPM     Counseling:     I provided patient education verbally regarding:   Patient diagnosis, treatment options, as well as alternatives, risks, and benefits.     This note was created using Dragon voice recognition software that occasionally misinterpreted phrases or words.

## 2020-04-08 ENCOUNTER — INFUSION (OUTPATIENT)
Dept: INFUSION THERAPY | Facility: HOSPITAL | Age: 80
End: 2020-04-08
Attending: INTERNAL MEDICINE
Payer: MEDICARE

## 2020-04-08 VITALS
RESPIRATION RATE: 16 BRPM | TEMPERATURE: 98 F | DIASTOLIC BLOOD PRESSURE: 85 MMHG | SYSTOLIC BLOOD PRESSURE: 142 MMHG | HEART RATE: 52 BPM

## 2020-04-08 DIAGNOSIS — M86.272 SUBACUTE OSTEOMYELITIS OF LEFT FOOT: Primary | ICD-10-CM

## 2020-04-08 PROCEDURE — 63600175 PHARM REV CODE 636 W HCPCS: Performed by: INTERNAL MEDICINE

## 2020-04-08 PROCEDURE — 96365 THER/PROPH/DIAG IV INF INIT: CPT | Mod: 59

## 2020-04-08 RX ORDER — HEPARIN SODIUM (PORCINE) LOCK FLUSH IV SOLN 100 UNIT/ML 100 UNIT/ML
300 SOLUTION INTRAVENOUS
Status: CANCELLED | OUTPATIENT
Start: 2020-04-10

## 2020-04-08 RX ORDER — SODIUM CHLORIDE 0.9 % (FLUSH) 0.9 %
10 SYRINGE (ML) INJECTION
Status: CANCELLED | OUTPATIENT
Start: 2020-04-10

## 2020-04-08 RX ADMIN — Medication 300 UNITS: at 08:04

## 2020-04-08 NOTE — PROGRESS NOTES
Pt picc flushed without problems and tolerated antibiotics as ordered,    Each port flushed with heparin as ordered.   Pt will return onelia as scheduled.

## 2020-04-09 ENCOUNTER — INFUSION (OUTPATIENT)
Dept: INFUSION THERAPY | Facility: HOSPITAL | Age: 80
End: 2020-04-09
Attending: INTERNAL MEDICINE
Payer: MEDICARE

## 2020-04-09 VITALS
HEART RATE: 61 BPM | OXYGEN SATURATION: 95 % | SYSTOLIC BLOOD PRESSURE: 142 MMHG | TEMPERATURE: 98 F | DIASTOLIC BLOOD PRESSURE: 78 MMHG | RESPIRATION RATE: 16 BRPM

## 2020-04-09 DIAGNOSIS — M86.272 SUBACUTE OSTEOMYELITIS OF LEFT FOOT: Primary | ICD-10-CM

## 2020-04-09 LAB
ACID FAST MOD KINY STN SPEC: NORMAL
MYCOBACTERIUM SPEC QL CULT: NORMAL

## 2020-04-09 PROCEDURE — 96365 THER/PROPH/DIAG IV INF INIT: CPT

## 2020-04-09 PROCEDURE — 63600175 PHARM REV CODE 636 W HCPCS: Performed by: INTERNAL MEDICINE

## 2020-04-09 RX ORDER — HEPARIN SODIUM (PORCINE) LOCK FLUSH IV SOLN 100 UNIT/ML 100 UNIT/ML
300 SOLUTION INTRAVENOUS
Status: CANCELLED | OUTPATIENT
Start: 2020-04-10

## 2020-04-09 RX ORDER — HEPARIN 100 UNIT/ML
300 SYRINGE INTRAVENOUS
Status: DISCONTINUED | OUTPATIENT
Start: 2020-04-09 | End: 2020-04-09 | Stop reason: HOSPADM

## 2020-04-09 RX ORDER — SODIUM CHLORIDE 0.9 % (FLUSH) 0.9 %
10 SYRINGE (ML) INJECTION
Status: DISCONTINUED | OUTPATIENT
Start: 2020-04-09 | End: 2020-04-10 | Stop reason: HOSPADM

## 2020-04-09 RX ORDER — HEPARIN 100 UNIT/ML
300 SYRINGE INTRAVENOUS
Status: DISCONTINUED | OUTPATIENT
Start: 2020-04-10 | End: 2020-04-10 | Stop reason: HOSPADM

## 2020-04-09 RX ORDER — SODIUM CHLORIDE 0.9 % (FLUSH) 0.9 %
10 SYRINGE (ML) INJECTION
Status: DISCONTINUED | OUTPATIENT
Start: 2020-04-09 | End: 2020-04-09 | Stop reason: HOSPADM

## 2020-04-09 RX ORDER — SODIUM CHLORIDE 0.9 % (FLUSH) 0.9 %
10 SYRINGE (ML) INJECTION
Status: CANCELLED | OUTPATIENT
Start: 2020-04-10

## 2020-04-09 RX ADMIN — CEFTRIAXONE 2 G: 2 INJECTION, SOLUTION INTRAVENOUS at 07:04

## 2020-04-09 RX ADMIN — Medication 600 UNITS: at 08:04

## 2020-04-10 ENCOUNTER — INFUSION (OUTPATIENT)
Dept: INFUSION THERAPY | Facility: HOSPITAL | Age: 80
End: 2020-04-10
Attending: INTERNAL MEDICINE
Payer: MEDICARE

## 2020-04-10 VITALS
RESPIRATION RATE: 16 BRPM | DIASTOLIC BLOOD PRESSURE: 83 MMHG | TEMPERATURE: 98 F | HEART RATE: 53 BPM | SYSTOLIC BLOOD PRESSURE: 151 MMHG | OXYGEN SATURATION: 98 %

## 2020-04-10 DIAGNOSIS — M86.272 SUBACUTE OSTEOMYELITIS OF LEFT FOOT: Primary | ICD-10-CM

## 2020-04-10 LAB
ALBUMIN SERPL BCP-MCNC: 4.2 G/DL (ref 3.5–5.2)
ALP SERPL-CCNC: 66 U/L (ref 55–135)
ALT SERPL W/O P-5'-P-CCNC: 22 U/L (ref 10–44)
ANION GAP SERPL CALC-SCNC: 7 MMOL/L (ref 8–16)
AST SERPL-CCNC: 20 U/L (ref 10–40)
BASOPHILS # BLD AUTO: 0.05 K/UL (ref 0–0.2)
BASOPHILS NFR BLD: 1.4 % (ref 0–1.9)
BILIRUB SERPL-MCNC: 1.5 MG/DL (ref 0.1–1)
BUN SERPL-MCNC: 18 MG/DL (ref 8–23)
CALCIUM SERPL-MCNC: 9 MG/DL (ref 8.7–10.5)
CHLORIDE SERPL-SCNC: 105 MMOL/L (ref 95–110)
CO2 SERPL-SCNC: 26 MMOL/L (ref 23–29)
CREAT SERPL-MCNC: 0.8 MG/DL (ref 0.5–1.4)
CRP SERPL-MCNC: <0.02 MG/DL (ref 0–0.75)
DIFFERENTIAL METHOD: ABNORMAL
EOSINOPHIL # BLD AUTO: 0.2 K/UL (ref 0–0.5)
EOSINOPHIL NFR BLD: 4.1 % (ref 0–8)
ERYTHROCYTE [DISTWIDTH] IN BLOOD BY AUTOMATED COUNT: 13.2 % (ref 11.5–14.5)
ERYTHROCYTE [SEDIMENTATION RATE] IN BLOOD BY WESTERGREN METHOD: 5 MM/HR (ref 0–10)
EST. GFR  (AFRICAN AMERICAN): >60 ML/MIN/1.73 M^2
EST. GFR  (NON AFRICAN AMERICAN): >60 ML/MIN/1.73 M^2
GLUCOSE SERPL-MCNC: 136 MG/DL (ref 70–110)
HCT VFR BLD AUTO: 43.5 % (ref 40–54)
HGB BLD-MCNC: 14.9 G/DL (ref 14–18)
IMM GRANULOCYTES # BLD AUTO: 0.01 K/UL (ref 0–0.04)
IMM GRANULOCYTES NFR BLD AUTO: 0.3 % (ref 0–0.5)
LYMPHOCYTES # BLD AUTO: 0.9 K/UL (ref 1–4.8)
LYMPHOCYTES NFR BLD: 25.5 % (ref 18–48)
MCH RBC QN AUTO: 30.2 PG (ref 27–31)
MCHC RBC AUTO-ENTMCNC: 34.3 G/DL (ref 32–36)
MCV RBC AUTO: 88 FL (ref 82–98)
MONOCYTES # BLD AUTO: 0.4 K/UL (ref 0.3–1)
MONOCYTES NFR BLD: 11.1 % (ref 4–15)
NEUTROPHILS # BLD AUTO: 2.1 K/UL (ref 1.8–7.7)
NEUTROPHILS NFR BLD: 57.6 % (ref 38–73)
NRBC BLD-RTO: 0 /100 WBC
PLATELET # BLD AUTO: 130 K/UL (ref 150–350)
PMV BLD AUTO: 10.8 FL (ref 9.2–12.9)
POTASSIUM SERPL-SCNC: 4 MMOL/L (ref 3.5–5.1)
PROT SERPL-MCNC: 6.7 G/DL (ref 6–8.4)
RBC # BLD AUTO: 4.93 M/UL (ref 4.6–6.2)
SODIUM SERPL-SCNC: 138 MMOL/L (ref 136–145)
WBC # BLD AUTO: 3.69 K/UL (ref 3.9–12.7)

## 2020-04-10 PROCEDURE — 85651 RBC SED RATE NONAUTOMATED: CPT

## 2020-04-10 PROCEDURE — A4216 STERILE WATER/SALINE, 10 ML: HCPCS | Performed by: INTERNAL MEDICINE

## 2020-04-10 PROCEDURE — 96365 THER/PROPH/DIAG IV INF INIT: CPT

## 2020-04-10 PROCEDURE — 85025 COMPLETE CBC W/AUTO DIFF WBC: CPT

## 2020-04-10 PROCEDURE — 25000003 PHARM REV CODE 250: Performed by: INTERNAL MEDICINE

## 2020-04-10 PROCEDURE — 86140 C-REACTIVE PROTEIN: CPT

## 2020-04-10 PROCEDURE — 99211 OFF/OP EST MAY X REQ PHY/QHP: CPT | Mod: 25

## 2020-04-10 PROCEDURE — 80053 COMPREHEN METABOLIC PANEL: CPT

## 2020-04-10 PROCEDURE — 36591 DRAW BLOOD OFF VENOUS DEVICE: CPT

## 2020-04-10 PROCEDURE — 63600175 PHARM REV CODE 636 W HCPCS: Performed by: INTERNAL MEDICINE

## 2020-04-10 RX ADMIN — SODIUM CHLORIDE, PRESERVATIVE FREE 10 ML: 5 INJECTION INTRAVENOUS at 08:04

## 2020-04-10 RX ADMIN — Medication 300 UNITS: at 08:04

## 2020-04-11 ENCOUNTER — INFUSION (OUTPATIENT)
Dept: INFUSION THERAPY | Facility: HOSPITAL | Age: 80
End: 2020-04-11
Attending: INTERNAL MEDICINE
Payer: MEDICARE

## 2020-04-11 VITALS
HEART RATE: 50 BPM | RESPIRATION RATE: 16 BRPM | OXYGEN SATURATION: 99 % | SYSTOLIC BLOOD PRESSURE: 170 MMHG | DIASTOLIC BLOOD PRESSURE: 81 MMHG | TEMPERATURE: 98 F

## 2020-04-11 DIAGNOSIS — M86.272 SUBACUTE OSTEOMYELITIS OF LEFT FOOT: Primary | ICD-10-CM

## 2020-04-11 PROCEDURE — 63600175 PHARM REV CODE 636 W HCPCS: Performed by: INTERNAL MEDICINE

## 2020-04-11 PROCEDURE — 96365 THER/PROPH/DIAG IV INF INIT: CPT

## 2020-04-11 RX ORDER — HEPARIN SODIUM (PORCINE) LOCK FLUSH IV SOLN 100 UNIT/ML 100 UNIT/ML
300 SOLUTION INTRAVENOUS
Status: CANCELLED | OUTPATIENT
Start: 2020-04-17

## 2020-04-11 RX ORDER — SODIUM CHLORIDE 0.9 % (FLUSH) 0.9 %
10 SYRINGE (ML) INJECTION
Status: CANCELLED | OUTPATIENT
Start: 2020-04-17

## 2020-04-11 RX ORDER — HEPARIN 100 UNIT/ML
300 SYRINGE INTRAVENOUS
Status: DISCONTINUED | OUTPATIENT
Start: 2020-04-11 | End: 2020-04-11 | Stop reason: HOSPADM

## 2020-04-11 RX ADMIN — Medication 300 UNITS: at 09:04

## 2020-04-11 RX ADMIN — CEFTRIAXONE 2 G: 2 INJECTION, SOLUTION INTRAVENOUS at 08:04

## 2020-04-12 ENCOUNTER — INFUSION (OUTPATIENT)
Dept: INFUSION THERAPY | Facility: HOSPITAL | Age: 80
End: 2020-04-12
Attending: INTERNAL MEDICINE
Payer: MEDICARE

## 2020-04-12 VITALS
HEART RATE: 53 BPM | TEMPERATURE: 98 F | RESPIRATION RATE: 16 BRPM | SYSTOLIC BLOOD PRESSURE: 139 MMHG | DIASTOLIC BLOOD PRESSURE: 84 MMHG | OXYGEN SATURATION: 98 %

## 2020-04-12 DIAGNOSIS — M86.272 SUBACUTE OSTEOMYELITIS OF LEFT FOOT: Primary | ICD-10-CM

## 2020-04-12 PROCEDURE — 96365 THER/PROPH/DIAG IV INF INIT: CPT | Mod: 59

## 2020-04-12 PROCEDURE — 63600175 PHARM REV CODE 636 W HCPCS: Performed by: INTERNAL MEDICINE

## 2020-04-12 RX ORDER — SODIUM CHLORIDE 0.9 % (FLUSH) 0.9 %
10 SYRINGE (ML) INJECTION
Status: CANCELLED | OUTPATIENT
Start: 2020-04-17

## 2020-04-12 RX ORDER — HEPARIN 100 UNIT/ML
300 SYRINGE INTRAVENOUS
Status: DISCONTINUED | OUTPATIENT
Start: 2020-04-12 | End: 2020-04-12 | Stop reason: HOSPADM

## 2020-04-12 RX ORDER — HEPARIN SODIUM (PORCINE) LOCK FLUSH IV SOLN 100 UNIT/ML 100 UNIT/ML
300 SOLUTION INTRAVENOUS
Status: CANCELLED | OUTPATIENT
Start: 2020-04-17

## 2020-04-12 RX ADMIN — Medication 300 UNITS: at 08:04

## 2020-04-12 RX ADMIN — CEFTRIAXONE 2 G: 2 INJECTION, SOLUTION INTRAVENOUS at 08:04

## 2020-04-13 ENCOUNTER — INFUSION (OUTPATIENT)
Dept: INFUSION THERAPY | Facility: HOSPITAL | Age: 80
End: 2020-04-13
Attending: INTERNAL MEDICINE
Payer: MEDICARE

## 2020-04-13 VITALS
OXYGEN SATURATION: 96 % | SYSTOLIC BLOOD PRESSURE: 150 MMHG | RESPIRATION RATE: 17 BRPM | TEMPERATURE: 98 F | DIASTOLIC BLOOD PRESSURE: 88 MMHG | HEART RATE: 58 BPM

## 2020-04-13 DIAGNOSIS — M86.272 SUBACUTE OSTEOMYELITIS OF LEFT FOOT: Primary | ICD-10-CM

## 2020-04-13 PROCEDURE — 96365 THER/PROPH/DIAG IV INF INIT: CPT | Mod: 59

## 2020-04-13 PROCEDURE — 63600175 PHARM REV CODE 636 W HCPCS: Performed by: INTERNAL MEDICINE

## 2020-04-13 RX ORDER — HEPARIN SODIUM (PORCINE) LOCK FLUSH IV SOLN 100 UNIT/ML 100 UNIT/ML
300 SOLUTION INTRAVENOUS
Status: DISCONTINUED | OUTPATIENT
Start: 2020-04-13 | End: 2020-04-13 | Stop reason: HOSPADM

## 2020-04-13 RX ORDER — HEPARIN SODIUM (PORCINE) LOCK FLUSH IV SOLN 100 UNIT/ML 100 UNIT/ML
300 SOLUTION INTRAVENOUS
Status: CANCELLED | OUTPATIENT
Start: 2020-04-17

## 2020-04-13 RX ORDER — HEPARIN SODIUM (PORCINE) LOCK FLUSH IV SOLN 100 UNIT/ML 100 UNIT/ML
300 SOLUTION INTRAVENOUS
Status: DISCONTINUED | OUTPATIENT
Start: 2020-04-13 | End: 2020-04-14 | Stop reason: HOSPADM

## 2020-04-13 RX ORDER — SODIUM CHLORIDE 0.9 % (FLUSH) 0.9 %
10 SYRINGE (ML) INJECTION
Status: DISCONTINUED | OUTPATIENT
Start: 2020-04-14 | End: 2020-04-14 | Stop reason: HOSPADM

## 2020-04-13 RX ORDER — SODIUM CHLORIDE 0.9 % (FLUSH) 0.9 %
10 SYRINGE (ML) INJECTION
Status: CANCELLED | OUTPATIENT
Start: 2020-04-17

## 2020-04-13 RX ADMIN — Medication 300 UNITS: at 08:04

## 2020-04-14 ENCOUNTER — INFUSION (OUTPATIENT)
Dept: INFUSION THERAPY | Facility: HOSPITAL | Age: 80
End: 2020-04-14
Attending: INTERNAL MEDICINE
Payer: MEDICARE

## 2020-04-14 ENCOUNTER — OFFICE VISIT (OUTPATIENT)
Dept: WOUND CARE | Facility: HOSPITAL | Age: 80
End: 2020-04-14
Attending: PODIATRIST
Payer: MEDICARE

## 2020-04-14 VITALS
TEMPERATURE: 98 F | SYSTOLIC BLOOD PRESSURE: 148 MMHG | HEART RATE: 54 BPM | DIASTOLIC BLOOD PRESSURE: 79 MMHG | OXYGEN SATURATION: 96 % | RESPIRATION RATE: 16 BRPM

## 2020-04-14 VITALS — TEMPERATURE: 97 F | HEART RATE: 84 BPM | DIASTOLIC BLOOD PRESSURE: 78 MMHG | SYSTOLIC BLOOD PRESSURE: 122 MMHG

## 2020-04-14 DIAGNOSIS — M86.272 SUBACUTE OSTEOMYELITIS OF LEFT FOOT: Primary | ICD-10-CM

## 2020-04-14 DIAGNOSIS — L97.525 NON-PRESSURE CHRONIC ULCER OF OTHER PART OF LEFT FOOT WITH MUSCLE INVOLVEMENT WITHOUT EVIDENCE OF NECROSIS: Primary | ICD-10-CM

## 2020-04-14 PROCEDURE — 11042 PR DEBRIDEMENT, SKIN, SUB-Q TISSUE,=<20 SQ CM: ICD-10-PCS | Mod: ,,, | Performed by: PODIATRIST

## 2020-04-14 PROCEDURE — 11042 DBRDMT SUBQ TIS 1ST 20SQCM/<: CPT | Performed by: PODIATRIST

## 2020-04-14 PROCEDURE — 63600175 PHARM REV CODE 636 W HCPCS: Performed by: INTERNAL MEDICINE

## 2020-04-14 PROCEDURE — A4216 STERILE WATER/SALINE, 10 ML: HCPCS | Performed by: INTERNAL MEDICINE

## 2020-04-14 PROCEDURE — 25000003 PHARM REV CODE 250: Performed by: INTERNAL MEDICINE

## 2020-04-14 PROCEDURE — 11042 DBRDMT SUBQ TIS 1ST 20SQCM/<: CPT | Mod: ,,, | Performed by: PODIATRIST

## 2020-04-14 PROCEDURE — 96365 THER/PROPH/DIAG IV INF INIT: CPT

## 2020-04-14 RX ORDER — HEPARIN SODIUM (PORCINE) LOCK FLUSH IV SOLN 100 UNIT/ML 100 UNIT/ML
300 SOLUTION INTRAVENOUS
Status: DISCONTINUED | OUTPATIENT
Start: 2020-04-15 | End: 2020-04-15 | Stop reason: HOSPADM

## 2020-04-14 RX ORDER — SODIUM CHLORIDE 0.9 % (FLUSH) 0.9 %
10 SYRINGE (ML) INJECTION
Status: CANCELLED | OUTPATIENT
Start: 2020-04-17

## 2020-04-14 RX ORDER — HEPARIN SODIUM (PORCINE) LOCK FLUSH IV SOLN 100 UNIT/ML 100 UNIT/ML
300 SOLUTION INTRAVENOUS
Status: CANCELLED | OUTPATIENT
Start: 2020-04-17

## 2020-04-14 RX ORDER — SODIUM CHLORIDE 0.9 % (FLUSH) 0.9 %
10 SYRINGE (ML) INJECTION
Status: DISCONTINUED | OUTPATIENT
Start: 2020-04-15 | End: 2020-04-15 | Stop reason: HOSPADM

## 2020-04-14 RX ADMIN — SODIUM CHLORIDE, PRESERVATIVE FREE 10 ML: 5 INJECTION INTRAVENOUS at 10:04

## 2020-04-14 RX ADMIN — Medication 300 UNITS: at 10:04

## 2020-04-14 NOTE — PROGRESS NOTES
[address] [facility] [address]  [place] [address]  Phone: [phone]   [phone]    Patient's PCP:Raphael Vargas MD  Referring Provider: No ref. provider found    Subjective:      Chief Complaint:: Wound Care    HPI  Kartik Hermosillo is a 79 y.o. male who presents with a complaint of a wound on his left great toe lasting for 8 weeks. Onset of the symptoms was open fracture of left great toe, which was surgically repaired by Dr. Martinez.  However, patient went on to develop a wound of the left great toe.  Current symptoms include pain and eschar.   Symptoms have increased. Treatment to date have included cleaning with normal saline and leaving it open to air, with no dressing.  He states this is what he was instructed to do.       He was referred to the [facility] by Dr. Martinez. He was placed on oral clindamycin at his last visit with Dr. Martinez on 2/7/20.     Today is the first time I am seeing the patient. I have reviewed his medical chart in detail.  I am concerned that he has underlying osteomyelitis of the left great toe.  I am able to palpate bone underlying the necrotic tissue on the top of his toe.  No drainage to culture.  Therefore, I will be ordering blood work and an MRI of his left foot. I discussed with him the treatment options if the MRI shows osteomyelitis of the toe.   I will include an A1c in his blood work as he has been told in the past that he is a borderline diabetic.     Until the results of the lab work and MRI are back, patient is to dress his toe consisting of Santyl, 4x4s, and gauze every day. He will also be receiving supplies I am ordering to change his dressings. Supplies will include Iodoflex and patient will begin dressing his toe using Iodoflex every other day, if he is not able to afford the santyl medication.        2/18:  Patient's wound has improved since last week.  Patient was approved for an MRI, and instructed to schedule with [facility] imaging.  My office called  him in order to schedule, but he had not returned the call.  Therefore, I had patient go directly to estimate imaging following this appointment in order to schedule his MRI.  Patient has been dressing with Iodosorb and dressing supplies.   He states he finished his oral antibiotics prescribed by Dr. Cruz this past Saturday. Patient to continue dressing with Iodosorb and dressing supplies.      2/24:  MRI came back showing osteomyelitis   1. Osteomyelitis of left great toe proximal phalangeal head.  2. Confluent low T1 bone marrow signal alteration affecting left great toe distal phalangeal base also suspicious for osteomyelitis given extent.  Bone marrow signal alteration related to healed fracture is felt less likely.  3. Negative for soft tissue abscess.  Patient referred to Infectious Disease for IV antibiotics.  Discussed patient with Dr. Field.  Bone biopsy taken by Dr. Gunner Rizvi on Friday, February 21st.  I was not able to do bone biopsy, due to being in wound clinic at the time.    Awaiting bone biopsy results.  Patient to continue Iodosorb dressing changes and follow up in 1 week. Debrided today.  Wound has improved.  Bone is still palpable.     3/3:  Patient currently receiving IV antibiotics in addition to oral antibiotics from Infectious Disease. Wound is improving.  Continue with Iodosorb.  Debrided today.     3/10:  Patient is still receiving IV antibiotics in addition to oral antibiotics from Infectious Disease.  Wound has improved again from last week.  Continue with Iodosorb.  Debrided today.  The toe itself has erythema and a mild increase in temperature.  I discussed with Dr. Alfredo who is treating patient for his osteomyelitis.  Picture in chart.  Left great toenail was removed today due to over 50% of the nail bed showing subungual hematoma.     3/17:   Wound has improved again from last week.  Continue with Iodosorb.  Debrided today.  The erythema has improved since last week.  Left great  toenail avulsion is healing normally.  Follow up in 1 week. '     3/24: Wound has improved again from last week.  Continue with Iodosorb.  Debrided today.  The erythema has improved since last week.  Left great toenail avulsion is healing normally.  Follow up in 1 week. '     3/31:  Wound is almost closed. Continue with Iodosorb.  Debrided today.  The erythema has improved since last week.  Left great toenail avulsion is healing normally.  Follow up in 1 week.      4/7: Wound has improved. Continue with Iodosorb.  Debrided today.  The erythema has improved since last week.  Left great toenail avulsion is healing normally.  Follow up in 1 week.      4/14:  Wound is nearly healed.  Continue with Iodosorb.  Debrided today.  The erythema has improved since last week.  Left great toenail avulsion is healing normally.  Follow up in 1 week.      Vitals:    04/14/20 1035   BP: 122/78   Pulse: 84   Temp: 97.2 °F (36.2 °C)     Shoe Size:     Past Surgical History:   Procedure Laterality Date    APPENDECTOMY      INCISION AND DRAINAGE Left 12/18/2019    Procedure: INCISION AND DRAINAGE;  Surgeon: Nick Martinez MD;  Location: [facility] OR;  Service: Orthopedics;  Laterality: Left;    REPAIR OF EXTENSOR TENDON Left 12/18/2019    Procedure: REPAIR, TENDON, EXTENSOR;  Surgeon: Nick Martinez MD;  Location: [facility] OR;  Service: Orthopedics;  Laterality: Left;  STANLEY ANA PAULALAKHWINDER NESBITT     History reviewed. No pertinent past medical history.  Family History   Problem Relation Age of Onset    Diabetes Father         Social History:   Marital Status: Single  Alcohol History:  reports that he drank alcohol.  Tobacco History:  reports that he has never smoked. He has never used smokeless tobacco.  Drug History:  reports that he does not use drugs.    Review of patient's allergies indicates:  No Known Allergies    Current Outpatient Medications   Medication Sig Dispense Refill    aspirin (ECOTRIN) 81 MG EC tablet Take 1 tablet by  mouth once daily.      doxycycline (VIBRAMYCIN) 100 MG Cap Take 1 capsule (100 mg total) by mouth every 12 (twelve) hours. 60 capsule 2    triamcinolone acetonide 0.1% (KENALOG) 0.1 % cream 1 application to affected area       No current facility-administered medications for this visit.      Facility-Administered Medications Ordered in Other Visits   Medication Dose Route Frequency Provider Last Rate Last Dose    heparin, porcine (PF) 100 unit/mL injection flush 300 Units  300 Units Intravenous PRN Zuly Alfredo MD   300 Units at 04/03/20 0755    sodium chloride 0.9% flush 10 mL  10 mL Intravenous PRN Zuly Alfredo MD           Review of Systems      Objective:        Physical Exam:   Foot Exam  Physical Exam  Much of the documentation for this visit was completed in the W[facility] Docs system.  Please see the attached documentation for further details about the patient's care. Scanned under the Media tab.        Karis Rizvi DPM   Imaging:            Assessment:       1. 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    Procedures - Much of the documentation for this visit was completed in the Wound Docs system.  Please see the attached documentation for further details about the patient's care. Scanned under the Media tab.        Karis Rizvi DPM     Counseling:     I provided patient education verbally regarding:   Patient diagnosis, treatment options, as well as alternatives, risks, and benefits.     This note was created using Dragon voice recognition software that occasionally misinterpreted phrases or words.

## 2020-04-15 ENCOUNTER — INFUSION (OUTPATIENT)
Dept: INFUSION THERAPY | Facility: HOSPITAL | Age: 80
End: 2020-04-15
Attending: INTERNAL MEDICINE
Payer: MEDICARE

## 2020-04-15 VITALS
RESPIRATION RATE: 18 BRPM | TEMPERATURE: 98 F | OXYGEN SATURATION: 97 % | DIASTOLIC BLOOD PRESSURE: 84 MMHG | HEART RATE: 52 BPM | SYSTOLIC BLOOD PRESSURE: 134 MMHG

## 2020-04-15 DIAGNOSIS — M86.272 SUBACUTE OSTEOMYELITIS OF LEFT FOOT: Primary | ICD-10-CM

## 2020-04-15 PROCEDURE — 96365 THER/PROPH/DIAG IV INF INIT: CPT | Mod: 59

## 2020-04-15 PROCEDURE — 63600175 PHARM REV CODE 636 W HCPCS: Performed by: INTERNAL MEDICINE

## 2020-04-15 RX ORDER — SODIUM CHLORIDE 0.9 % (FLUSH) 0.9 %
10 SYRINGE (ML) INJECTION
Status: CANCELLED | OUTPATIENT
Start: 2020-04-17

## 2020-04-15 RX ORDER — HEPARIN SODIUM (PORCINE) LOCK FLUSH IV SOLN 100 UNIT/ML 100 UNIT/ML
300 SOLUTION INTRAVENOUS
Status: CANCELLED | OUTPATIENT
Start: 2020-04-17

## 2020-04-15 RX ORDER — HEPARIN 100 UNIT/ML
300 SYRINGE INTRAVENOUS
Status: DISCONTINUED | OUTPATIENT
Start: 2020-04-16 | End: 2020-04-16 | Stop reason: HOSPADM

## 2020-04-15 RX ORDER — SODIUM CHLORIDE 0.9 % (FLUSH) 0.9 %
10 SYRINGE (ML) INJECTION
Status: DISCONTINUED | OUTPATIENT
Start: 2020-04-16 | End: 2020-04-16 | Stop reason: HOSPADM

## 2020-04-15 RX ADMIN — Medication 600 UNITS: at 08:04

## 2020-04-16 ENCOUNTER — INFUSION (OUTPATIENT)
Dept: INFUSION THERAPY | Facility: HOSPITAL | Age: 80
End: 2020-04-16
Attending: INTERNAL MEDICINE
Payer: MEDICARE

## 2020-04-16 VITALS
SYSTOLIC BLOOD PRESSURE: 136 MMHG | DIASTOLIC BLOOD PRESSURE: 83 MMHG | OXYGEN SATURATION: 98 % | RESPIRATION RATE: 16 BRPM | TEMPERATURE: 98 F | HEART RATE: 53 BPM

## 2020-04-16 DIAGNOSIS — M86.272 SUBACUTE OSTEOMYELITIS OF LEFT FOOT: Primary | ICD-10-CM

## 2020-04-16 PROCEDURE — 25000003 PHARM REV CODE 250: Performed by: INTERNAL MEDICINE

## 2020-04-16 PROCEDURE — A4216 STERILE WATER/SALINE, 10 ML: HCPCS | Performed by: INTERNAL MEDICINE

## 2020-04-16 PROCEDURE — 96365 THER/PROPH/DIAG IV INF INIT: CPT

## 2020-04-16 PROCEDURE — 63600175 PHARM REV CODE 636 W HCPCS: Performed by: INTERNAL MEDICINE

## 2020-04-16 RX ORDER — HEPARIN SODIUM (PORCINE) LOCK FLUSH IV SOLN 100 UNIT/ML 100 UNIT/ML
300 SOLUTION INTRAVENOUS
Status: CANCELLED | OUTPATIENT
Start: 2020-04-17

## 2020-04-16 RX ORDER — HEPARIN 100 UNIT/ML
300 SYRINGE INTRAVENOUS
Status: DISCONTINUED | OUTPATIENT
Start: 2020-04-16 | End: 2020-04-17 | Stop reason: HOSPADM

## 2020-04-16 RX ORDER — SODIUM CHLORIDE 0.9 % (FLUSH) 0.9 %
10 SYRINGE (ML) INJECTION
Status: DISCONTINUED | OUTPATIENT
Start: 2020-04-16 | End: 2020-04-17 | Stop reason: HOSPADM

## 2020-04-16 RX ORDER — SODIUM CHLORIDE 0.9 % (FLUSH) 0.9 %
10 SYRINGE (ML) INJECTION
Status: CANCELLED | OUTPATIENT
Start: 2020-04-17

## 2020-04-16 RX ADMIN — SODIUM CHLORIDE, PRESERVATIVE FREE 10 ML: 5 INJECTION INTRAVENOUS at 08:04

## 2020-04-16 RX ADMIN — Medication 300 UNITS: at 08:04

## 2020-04-17 ENCOUNTER — INFUSION (OUTPATIENT)
Dept: INFUSION THERAPY | Facility: HOSPITAL | Age: 80
End: 2020-04-17
Attending: INTERNAL MEDICINE
Payer: MEDICARE

## 2020-04-17 VITALS
HEART RATE: 51 BPM | TEMPERATURE: 97 F | DIASTOLIC BLOOD PRESSURE: 71 MMHG | RESPIRATION RATE: 16 BRPM | OXYGEN SATURATION: 96 % | SYSTOLIC BLOOD PRESSURE: 140 MMHG

## 2020-04-17 DIAGNOSIS — M86.272 SUBACUTE OSTEOMYELITIS OF LEFT FOOT: Primary | ICD-10-CM

## 2020-04-17 LAB
ALBUMIN SERPL BCP-MCNC: 4.1 G/DL (ref 3.5–5.2)
ALP SERPL-CCNC: 63 U/L (ref 55–135)
ALT SERPL W/O P-5'-P-CCNC: 20 U/L (ref 10–44)
ANION GAP SERPL CALC-SCNC: 7 MMOL/L (ref 8–16)
AST SERPL-CCNC: 16 U/L (ref 10–40)
BASOPHILS # BLD AUTO: 0.04 K/UL (ref 0–0.2)
BASOPHILS NFR BLD: 1.1 % (ref 0–1.9)
BILIRUB SERPL-MCNC: 1.5 MG/DL (ref 0.1–1)
BUN SERPL-MCNC: 14 MG/DL (ref 8–23)
CALCIUM SERPL-MCNC: 9 MG/DL (ref 8.7–10.5)
CHLORIDE SERPL-SCNC: 104 MMOL/L (ref 95–110)
CO2 SERPL-SCNC: 26 MMOL/L (ref 23–29)
CREAT SERPL-MCNC: 0.8 MG/DL (ref 0.5–1.4)
CRP SERPL-MCNC: <0.02 MG/DL (ref 0–0.75)
DIFFERENTIAL METHOD: ABNORMAL
EOSINOPHIL # BLD AUTO: 0.1 K/UL (ref 0–0.5)
EOSINOPHIL NFR BLD: 2.4 % (ref 0–8)
ERYTHROCYTE [DISTWIDTH] IN BLOOD BY AUTOMATED COUNT: 13 % (ref 11.5–14.5)
ERYTHROCYTE [SEDIMENTATION RATE] IN BLOOD BY WESTERGREN METHOD: 4 MM/HR (ref 0–10)
EST. GFR  (AFRICAN AMERICAN): >60 ML/MIN/1.73 M^2
EST. GFR  (NON AFRICAN AMERICAN): >60 ML/MIN/1.73 M^2
GLUCOSE SERPL-MCNC: 136 MG/DL (ref 70–110)
HCT VFR BLD AUTO: 42.5 % (ref 40–54)
HGB BLD-MCNC: 14.6 G/DL (ref 14–18)
IMM GRANULOCYTES # BLD AUTO: 0.01 K/UL (ref 0–0.04)
IMM GRANULOCYTES NFR BLD AUTO: 0.3 % (ref 0–0.5)
LYMPHOCYTES # BLD AUTO: 1 K/UL (ref 1–4.8)
LYMPHOCYTES NFR BLD: 26.3 % (ref 18–48)
MCH RBC QN AUTO: 30.2 PG (ref 27–31)
MCHC RBC AUTO-ENTMCNC: 34.4 G/DL (ref 32–36)
MCV RBC AUTO: 88 FL (ref 82–98)
MONOCYTES # BLD AUTO: 0.4 K/UL (ref 0.3–1)
MONOCYTES NFR BLD: 9.9 % (ref 4–15)
NEUTROPHILS # BLD AUTO: 2.2 K/UL (ref 1.8–7.7)
NEUTROPHILS NFR BLD: 60 % (ref 38–73)
NRBC BLD-RTO: 0 /100 WBC
PLATELET # BLD AUTO: 129 K/UL (ref 150–350)
PMV BLD AUTO: 11.4 FL (ref 9.2–12.9)
POTASSIUM SERPL-SCNC: 3.9 MMOL/L (ref 3.5–5.1)
PROT SERPL-MCNC: 6.2 G/DL (ref 6–8.4)
RBC # BLD AUTO: 4.83 M/UL (ref 4.6–6.2)
SODIUM SERPL-SCNC: 137 MMOL/L (ref 136–145)
WBC # BLD AUTO: 3.72 K/UL (ref 3.9–12.7)

## 2020-04-17 PROCEDURE — 80053 COMPREHEN METABOLIC PANEL: CPT

## 2020-04-17 PROCEDURE — 86140 C-REACTIVE PROTEIN: CPT

## 2020-04-17 PROCEDURE — 85651 RBC SED RATE NONAUTOMATED: CPT

## 2020-04-17 PROCEDURE — 63600175 PHARM REV CODE 636 W HCPCS: Performed by: INTERNAL MEDICINE

## 2020-04-17 PROCEDURE — 99211 OFF/OP EST MAY X REQ PHY/QHP: CPT | Mod: 25

## 2020-04-17 PROCEDURE — 85025 COMPLETE CBC W/AUTO DIFF WBC: CPT

## 2020-04-17 PROCEDURE — 36591 DRAW BLOOD OFF VENOUS DEVICE: CPT

## 2020-04-17 PROCEDURE — 96365 THER/PROPH/DIAG IV INF INIT: CPT | Mod: 59

## 2020-04-17 RX ORDER — HEPARIN SODIUM (PORCINE) LOCK FLUSH IV SOLN 100 UNIT/ML 100 UNIT/ML
300 SOLUTION INTRAVENOUS
Status: CANCELLED | OUTPATIENT
Start: 2020-04-24

## 2020-04-17 RX ORDER — HEPARIN 100 UNIT/ML
300 SYRINGE INTRAVENOUS
Status: DISCONTINUED | OUTPATIENT
Start: 2020-04-17 | End: 2020-04-18 | Stop reason: HOSPADM

## 2020-04-17 RX ORDER — SODIUM CHLORIDE 0.9 % (FLUSH) 0.9 %
10 SYRINGE (ML) INJECTION
Status: DISCONTINUED | OUTPATIENT
Start: 2020-04-17 | End: 2020-04-18 | Stop reason: HOSPADM

## 2020-04-17 RX ORDER — SODIUM CHLORIDE 0.9 % (FLUSH) 0.9 %
10 SYRINGE (ML) INJECTION
Status: CANCELLED | OUTPATIENT
Start: 2020-04-24

## 2020-04-17 RX ADMIN — Medication 300 UNITS: at 08:04

## 2020-04-18 ENCOUNTER — INFUSION (OUTPATIENT)
Dept: INFUSION THERAPY | Facility: HOSPITAL | Age: 80
End: 2020-04-18
Attending: INTERNAL MEDICINE
Payer: MEDICARE

## 2020-04-18 VITALS
DIASTOLIC BLOOD PRESSURE: 80 MMHG | OXYGEN SATURATION: 97 % | SYSTOLIC BLOOD PRESSURE: 138 MMHG | HEART RATE: 60 BPM | TEMPERATURE: 98 F | RESPIRATION RATE: 16 BRPM

## 2020-04-18 DIAGNOSIS — M86.272 SUBACUTE OSTEOMYELITIS OF LEFT FOOT: Primary | ICD-10-CM

## 2020-04-18 PROCEDURE — 96365 THER/PROPH/DIAG IV INF INIT: CPT | Mod: 59

## 2020-04-18 PROCEDURE — 63600175 PHARM REV CODE 636 W HCPCS: Performed by: INTERNAL MEDICINE

## 2020-04-18 RX ORDER — HEPARIN 100 UNIT/ML
300 SYRINGE INTRAVENOUS
Status: DISCONTINUED | OUTPATIENT
Start: 2020-04-19 | End: 2020-04-19 | Stop reason: HOSPADM

## 2020-04-18 RX ORDER — SODIUM CHLORIDE 0.9 % (FLUSH) 0.9 %
10 SYRINGE (ML) INJECTION
Status: CANCELLED | OUTPATIENT
Start: 2020-04-24

## 2020-04-18 RX ORDER — HEPARIN SODIUM (PORCINE) LOCK FLUSH IV SOLN 100 UNIT/ML 100 UNIT/ML
300 SOLUTION INTRAVENOUS
Status: CANCELLED | OUTPATIENT
Start: 2020-04-24

## 2020-04-18 RX ADMIN — Medication 300 UNITS: at 09:04

## 2020-04-18 RX ADMIN — CEFTRIAXONE 2 G: 2 INJECTION, SOLUTION INTRAVENOUS at 09:04

## 2020-04-19 ENCOUNTER — INFUSION (OUTPATIENT)
Dept: INFUSION THERAPY | Facility: HOSPITAL | Age: 80
End: 2020-04-19
Attending: INTERNAL MEDICINE
Payer: MEDICARE

## 2020-04-19 VITALS
SYSTOLIC BLOOD PRESSURE: 135 MMHG | HEART RATE: 56 BPM | TEMPERATURE: 98 F | DIASTOLIC BLOOD PRESSURE: 80 MMHG | OXYGEN SATURATION: 97 % | RESPIRATION RATE: 16 BRPM

## 2020-04-19 DIAGNOSIS — M86.272 SUBACUTE OSTEOMYELITIS OF LEFT FOOT: Primary | ICD-10-CM

## 2020-04-19 PROCEDURE — 63600175 PHARM REV CODE 636 W HCPCS: Performed by: INTERNAL MEDICINE

## 2020-04-19 PROCEDURE — 96365 THER/PROPH/DIAG IV INF INIT: CPT | Mod: 59

## 2020-04-19 RX ORDER — SODIUM CHLORIDE 0.9 % (FLUSH) 0.9 %
10 SYRINGE (ML) INJECTION
Status: CANCELLED | OUTPATIENT
Start: 2020-04-24

## 2020-04-19 RX ORDER — HEPARIN 100 UNIT/ML
300 SYRINGE INTRAVENOUS
Status: DISCONTINUED | OUTPATIENT
Start: 2020-04-20 | End: 2020-04-20 | Stop reason: HOSPADM

## 2020-04-19 RX ORDER — HEPARIN SODIUM (PORCINE) LOCK FLUSH IV SOLN 100 UNIT/ML 100 UNIT/ML
300 SOLUTION INTRAVENOUS
Status: CANCELLED | OUTPATIENT
Start: 2020-04-24

## 2020-04-19 RX ADMIN — CEFTRIAXONE 2 G: 2 INJECTION, SOLUTION INTRAVENOUS at 09:04

## 2020-04-19 RX ADMIN — Medication 300 UNITS: at 09:04

## 2020-04-20 ENCOUNTER — INFUSION (OUTPATIENT)
Dept: INFUSION THERAPY | Facility: HOSPITAL | Age: 80
End: 2020-04-20
Attending: INTERNAL MEDICINE
Payer: MEDICARE

## 2020-04-20 VITALS
TEMPERATURE: 98 F | DIASTOLIC BLOOD PRESSURE: 74 MMHG | SYSTOLIC BLOOD PRESSURE: 144 MMHG | HEART RATE: 64 BPM | RESPIRATION RATE: 18 BRPM | OXYGEN SATURATION: 96 %

## 2020-04-20 DIAGNOSIS — M86.272 SUBACUTE OSTEOMYELITIS OF LEFT FOOT: Primary | ICD-10-CM

## 2020-04-20 PROCEDURE — 63600175 PHARM REV CODE 636 W HCPCS: Performed by: INTERNAL MEDICINE

## 2020-04-20 PROCEDURE — A4216 STERILE WATER/SALINE, 10 ML: HCPCS | Performed by: INTERNAL MEDICINE

## 2020-04-20 PROCEDURE — 96365 THER/PROPH/DIAG IV INF INIT: CPT | Mod: 59

## 2020-04-20 PROCEDURE — 25000003 PHARM REV CODE 250: Performed by: INTERNAL MEDICINE

## 2020-04-20 RX ORDER — SODIUM CHLORIDE 0.9 % (FLUSH) 0.9 %
10 SYRINGE (ML) INJECTION
Status: DISCONTINUED | OUTPATIENT
Start: 2020-04-20 | End: 2020-04-20 | Stop reason: HOSPADM

## 2020-04-20 RX ORDER — SODIUM CHLORIDE 0.9 % (FLUSH) 0.9 %
10 SYRINGE (ML) INJECTION
Status: CANCELLED | OUTPATIENT
Start: 2020-04-24

## 2020-04-20 RX ORDER — DOXYCYCLINE 100 MG/1
100 CAPSULE ORAL EVERY 12 HOURS
Qty: 60 CAPSULE | Refills: 2 | Status: SHIPPED | OUTPATIENT
Start: 2020-04-20 | End: 2020-07-19

## 2020-04-20 RX ORDER — SODIUM CHLORIDE 0.9 % (FLUSH) 0.9 %
10 SYRINGE (ML) INJECTION
Status: DISCONTINUED | OUTPATIENT
Start: 2020-04-20 | End: 2020-04-21 | Stop reason: HOSPADM

## 2020-04-20 RX ORDER — HEPARIN SODIUM (PORCINE) LOCK FLUSH IV SOLN 100 UNIT/ML 100 UNIT/ML
300 SOLUTION INTRAVENOUS
Status: CANCELLED | OUTPATIENT
Start: 2020-04-24

## 2020-04-20 RX ORDER — HEPARIN 100 UNIT/ML
300 SYRINGE INTRAVENOUS
Status: DISCONTINUED | OUTPATIENT
Start: 2020-04-20 | End: 2020-04-21 | Stop reason: HOSPADM

## 2020-04-20 RX ADMIN — SODIUM CHLORIDE, PRESERVATIVE FREE 10 ML: 5 INJECTION INTRAVENOUS at 08:04

## 2020-04-20 RX ADMIN — Medication 300 UNITS: at 08:04

## 2020-04-20 NOTE — TELEPHONE ENCOUNTER
----- Message from Keyona Traylor sent at 4/20/2020  9:13 AM CDT -----  Patient is asking for a refill of his medication for acne  [phone]

## 2020-04-21 ENCOUNTER — INFUSION (OUTPATIENT)
Dept: INFUSION THERAPY | Facility: HOSPITAL | Age: 80
End: 2020-04-21
Attending: INTERNAL MEDICINE
Payer: MEDICARE

## 2020-04-21 ENCOUNTER — TELEPHONE (OUTPATIENT)
Dept: INFECTIOUS DISEASES | Facility: CLINIC | Age: 80
End: 2020-04-21

## 2020-04-21 ENCOUNTER — OFFICE VISIT (OUTPATIENT)
Dept: WOUND CARE | Facility: HOSPITAL | Age: 80
End: 2020-04-21
Attending: PODIATRIST
Payer: MEDICARE

## 2020-04-21 VITALS
TEMPERATURE: 98 F | SYSTOLIC BLOOD PRESSURE: 140 MMHG | HEART RATE: 82 BPM | DIASTOLIC BLOOD PRESSURE: 86 MMHG | OXYGEN SATURATION: 95 % | RESPIRATION RATE: 16 BRPM

## 2020-04-21 VITALS
RESPIRATION RATE: 17 BRPM | SYSTOLIC BLOOD PRESSURE: 132 MMHG | DIASTOLIC BLOOD PRESSURE: 84 MMHG | TEMPERATURE: 97 F | HEART RATE: 78 BPM

## 2020-04-21 DIAGNOSIS — L97.522 ISCHEMIC ULCER OF FOOT, LEFT, WITH FAT LAYER EXPOSED: Primary | ICD-10-CM

## 2020-04-21 DIAGNOSIS — M86.272 SUBACUTE OSTEOMYELITIS OF LEFT FOOT: Primary | ICD-10-CM

## 2020-04-21 PROCEDURE — 11042 DBRDMT SUBQ TIS 1ST 20SQCM/<: CPT | Mod: ,,, | Performed by: PODIATRIST

## 2020-04-21 PROCEDURE — A4216 STERILE WATER/SALINE, 10 ML: HCPCS | Performed by: INTERNAL MEDICINE

## 2020-04-21 PROCEDURE — 25000003 PHARM REV CODE 250: Performed by: INTERNAL MEDICINE

## 2020-04-21 PROCEDURE — 63600175 PHARM REV CODE 636 W HCPCS: Performed by: INTERNAL MEDICINE

## 2020-04-21 PROCEDURE — 96365 THER/PROPH/DIAG IV INF INIT: CPT

## 2020-04-21 PROCEDURE — 11042 DBRDMT SUBQ TIS 1ST 20SQCM/<: CPT | Performed by: PODIATRIST

## 2020-04-21 PROCEDURE — 11042 PR DEBRIDEMENT, SKIN, SUB-Q TISSUE,=<20 SQ CM: ICD-10-PCS | Mod: ,,, | Performed by: PODIATRIST

## 2020-04-21 RX ADMIN — SODIUM CHLORIDE, PRESERVATIVE FREE 10 ML: 5 INJECTION INTRAVENOUS at 09:04

## 2020-04-21 RX ADMIN — Medication 300 UNITS: at 09:04

## 2020-04-21 NOTE — TELEPHONE ENCOUNTER
Discussed with [facility] nurse.  Reviewed chart.  Markers of inflammation are normal.  Reviewed chart of 0 podiatrist.  Wound does not look infected anymore and she expected to heal over the next week.  Can discontinue antibiotics tomorrow.  Continue wound care

## 2020-04-21 NOTE — PROGRESS NOTES
[address] [facility] [address]  [place] [address]  Phone: [phone]   [phone]    Patient's PCP:Raphael Vargas MD  Referring Provider: No ref. provider found    Subjective:      Chief Complaint:: No chief complaint on file.    HPI  Kartik Hermosillo is a 79 y.o. male who presents with a complaint of a wound on his left great toe lasting for 8 weeks. Onset of the symptoms was open fracture of left great toe, which was surgically repaired by Dr. Martinez.  However, patient went on to develop a wound of the left great toe.  Current symptoms include pain and eschar.   Symptoms have increased. Treatment to date have included cleaning with normal saline and leaving it open to air, with no dressing.  He states this is what he was instructed to do.       He was referred to the wound Care Center by Dr. Martinez. He was placed on oral clindamycin at his last visit with Dr. Martinez on 2/7/20.     Today is the first time I am seeing the patient. I have reviewed his medical chart in detail.  I am concerned that he has underlying osteomyelitis of the left great toe.  I am able to palpate bone underlying the necrotic tissue on the top of his toe.  No drainage to culture.  Therefore, I will be ordering blood work and an MRI of his left foot. I discussed with him the treatment options if the MRI shows osteomyelitis of the toe.   I will include an A1c in his blood work as he has been told in the past that he is a borderline diabetic.     Until the results of the lab work and MRI are back, patient is to dress his toe consisting of Santyl, 4x4s, and gauze every day. He will also be receiving supplies I am ordering to change his dressings. Supplies will include Iodoflex and patient will begin dressing his toe using Iodoflex every other day, if he is not able to afford the santyl medication.        2/18:  Patient's wound has improved since last week.  Patient was approved for an MRI, and instructed to schedule with [facility] imaging.  My  office called him in order to schedule, but he had not returned the call.  Therefore, I had patient go directly to estimate imaging following this appointment in order to schedule his MRI.  Patient has been dressing with Iodosorb and dressing supplies.   He states he finished his oral antibiotics prescribed by Dr. Cruz this past Saturday. Patient to continue dressing with Iodosorb and dressing supplies.      2/24:  MRI came back showing osteomyelitis   1. Osteomyelitis of left great toe proximal phalangeal head.  2. Confluent low T1 bone marrow signal alteration affecting left great toe distal phalangeal base also suspicious for osteomyelitis given extent.  Bone marrow signal alteration related to healed fracture is felt less likely.  3. Negative for soft tissue abscess.  Patient referred to Infectious Disease for IV antibiotics.  Discussed patient with Dr. Field.  Bone biopsy taken by Dr. Gunner Rizvi on Friday, February 21st.  I was not able to do bone biopsy, due to being in wound clinic at the time.    Awaiting bone biopsy results.  Patient to continue Iodosorb dressing changes and follow up in 1 week. Debrided today.  Wound has improved.  Bone is still palpable.     3/3:  Patient currently receiving IV antibiotics in addition to oral antibiotics from Infectious Disease. Wound is improving.  Continue with Iodosorb.  Debrided today.     3/10:  Patient is still receiving IV antibiotics in addition to oral antibiotics from Infectious Disease.  Wound has improved again from last week.  Continue with Iodosorb.  Debrided today.  The toe itself has erythema and a mild increase in temperature.  I discussed with Dr. Alfredo who is treating patient for his osteomyelitis.  Picture in chart.  Left great toenail was removed today due to over 50% of the nail bed showing subungual hematoma.     3/17:   Wound has improved again from last week.  Continue with Iodosorb.  Debrided today.  The erythema has improved since last  week.  Left great toenail avulsion is healing normally.  Follow up in 1 week. '     3/24: Wound has improved again from last week.  Continue with Iodosorb.  Debrided today.  The erythema has improved since last week.  Left great toenail avulsion is healing normally.  Follow up in 1 week. '     3/31:  Wound is almost closed. Continue with Iodosorb.  Debrided today.  The erythema has improved since last week.  Left great toenail avulsion is healing normally.  Follow up in 1 week.      4/7: Wound has improved. Continue with Iodosorb.  Debrided today.  The erythema has improved since last week.  Left great toenail avulsion is healing normally.  Follow up in 1 week.      4/14:  Wound is nearly healed.  Continue with Iodosorb.  Debrided today.  The erythema has improved since last week.  Left great toenail avulsion is healing normally.  Follow up in 1 week.    4/21:  Continue with collagen.  Debrided today.  Erythema has continued to decrease.  Patient's last infusion is tomorrow.  It does not appear infected.  Wound should be healed by next week.  Each week a continues to decreased in size.    Vitals:    04/21/20 0836   BP: 132/84   Pulse: 78   Resp: 17   Temp: 96.9 °F (36.1 °C)     Shoe Size:     Past Surgical History:   Procedure Laterality Date    APPENDECTOMY      INCISION AND DRAINAGE Left 12/18/2019    Procedure: INCISION AND DRAINAGE;  Surgeon: Nick Martinez MD;  Location: [facility] OR;  Service: Orthopedics;  Laterality: Left;    REPAIR OF EXTENSOR TENDON Left 12/18/2019    Procedure: REPAIR, TENDON, EXTENSOR;  Surgeon: Nick Martinez MD;  Location: [facility] OR;  Service: Orthopedics;  Laterality: Left;  PRACHI ANGEL     History reviewed. No pertinent past medical history.  Family History   Problem Relation Age of Onset    Diabetes Father         Social History:   Marital Status: Single  Alcohol History:  reports that he drank alcohol.  Tobacco History:  reports that he has never smoked. He has  never used smokeless tobacco.  Drug History:  reports that he does not use drugs.    Review of patient's allergies indicates:  No Known Allergies    Current Outpatient Medications   Medication Sig Dispense Refill    aspirin (ECOTRIN) 81 MG EC tablet Take 1 tablet by mouth once daily.      doxycycline (VIBRAMYCIN) 100 MG Cap Take 1 capsule (100 mg total) by mouth every 12 (twelve) hours. 60 capsule 2    triamcinolone acetonide 0.1% (KENALOG) 0.1 % cream 1 application to affected area       No current facility-administered medications for this visit.      Facility-Administered Medications Ordered in Other Visits   Medication Dose Route Frequency Provider Last Rate Last Dose    cefTRIAXone (ROCEPHIN) 2 g in dextrose 5 % 50 mL IVPB  2 g Intravenous Daily Zuly Alfredo MD        heparin, porcine (PF) 100 unit/mL injection flush 300 Units  300 Units Intravenous PRN Zuly Alfredo MD   300 Units at 04/03/20 0755    heparin, porcine (PF) 100 unit/mL injection flush 300 Units  300 Units Intravenous PRN Zuly Alfredo MD        sodium chloride 0.9% flush 10 mL  10 mL Intravenous PRN Zuly Alfredo MD        sodium chloride 0.9% flush 10 mL  10 mL Intravenous PRN Zuly Alfredo MD           Review of Systems      Objective:        Physical Exam:   Foot Exam  Physical Exam  Much of the documentation for this visit was completed in the Wound Docs system.  Please see the attached documentation for further details about the patient's care. Scanned under the Media tab.        Karis Rizvi DPM   Imaging:            Assessment:       1. Ischemic ulcer of foot, left, with fat layer exposed      Plan:   Ischemic ulcer of foot, left, with fat layer exposed      Follow up in about 1 week (around 4/28/2020) for wound care.    Procedures - Much of the documentation for this visit was completed in the Wound Docs system.  Please see the attached documentation for further details about the patient's care. Scanned under the Media tab.         Karis Rizvi DPM     Counseling:     I provided patient education verbally regarding:   Patient diagnosis, treatment options, as well as alternatives, risks, and benefits.     This note was created using Dragon voice recognition software that occasionally misinterpreted phrases or words.

## 2020-04-22 ENCOUNTER — INFUSION (OUTPATIENT)
Dept: INFUSION THERAPY | Facility: HOSPITAL | Age: 80
End: 2020-04-22
Attending: INTERNAL MEDICINE
Payer: MEDICARE

## 2020-04-22 VITALS
SYSTOLIC BLOOD PRESSURE: 138 MMHG | RESPIRATION RATE: 16 BRPM | OXYGEN SATURATION: 96 % | TEMPERATURE: 98 F | HEART RATE: 72 BPM | DIASTOLIC BLOOD PRESSURE: 74 MMHG

## 2020-04-22 DIAGNOSIS — M86.272 SUBACUTE OSTEOMYELITIS OF LEFT FOOT: Primary | ICD-10-CM

## 2020-04-22 PROCEDURE — 63600175 PHARM REV CODE 636 W HCPCS: Performed by: INTERNAL MEDICINE

## 2020-04-22 PROCEDURE — 96365 THER/PROPH/DIAG IV INF INIT: CPT | Mod: 59

## 2020-04-22 RX ORDER — HEPARIN SODIUM (PORCINE) LOCK FLUSH IV SOLN 100 UNIT/ML 100 UNIT/ML
300 SOLUTION INTRAVENOUS
Status: CANCELLED | OUTPATIENT
Start: 2020-04-24

## 2020-04-22 RX ORDER — SODIUM CHLORIDE 0.9 % (FLUSH) 0.9 %
10 SYRINGE (ML) INJECTION
Status: CANCELLED | OUTPATIENT
Start: 2020-04-24

## 2020-04-28 ENCOUNTER — OFFICE VISIT (OUTPATIENT)
Dept: WOUND CARE | Facility: HOSPITAL | Age: 80
End: 2020-04-28
Attending: PODIATRIST
Payer: MEDICARE

## 2020-04-28 VITALS
TEMPERATURE: 97 F | DIASTOLIC BLOOD PRESSURE: 78 MMHG | HEART RATE: 78 BPM | RESPIRATION RATE: 16 BRPM | SYSTOLIC BLOOD PRESSURE: 122 MMHG

## 2020-04-28 DIAGNOSIS — L97.525 NON-PRESSURE CHRONIC ULCER OF OTHER PART OF LEFT FOOT WITH MUSCLE INVOLVEMENT WITHOUT EVIDENCE OF NECROSIS: Primary | ICD-10-CM

## 2020-04-28 PROCEDURE — 11042 DBRDMT SUBQ TIS 1ST 20SQCM/<: CPT | Performed by: PODIATRIST

## 2020-04-28 PROCEDURE — 11042 DBRDMT SUBQ TIS 1ST 20SQCM/<: CPT | Mod: ,,, | Performed by: PODIATRIST

## 2020-04-28 PROCEDURE — 11042 PR DEBRIDEMENT, SKIN, SUB-Q TISSUE,=<20 SQ CM: ICD-10-PCS | Mod: ,,, | Performed by: PODIATRIST

## 2020-04-28 NOTE — PROGRESS NOTES
[address] [facility] [address]  [place] [address]  Phone: [phone]   [phone]    Patient's PCP:Raphael Vargas MD  Referring Provider: No ref. provider found    Subjective:      Chief Complaint:: Wound Care    HPI  Kartik Hermosillo is a 79 y.o. male who presents with a complaint of a wound on his left great toe lasting for 8 weeks. Onset of the symptoms was open fracture of left great toe, which was surgically repaired by Dr. Martinez.  However, patient went on to develop a wound of the left great toe.  Current symptoms include pain and eschar.   Symptoms have increased. Treatment to date have included cleaning with normal saline and leaving it open to air, with no dressing.  He states this is what he was instructed to do.       He was referred to the [facility] by Dr. Martinez. He was placed on oral clindamycin at his last visit with Dr. Martinez on 2/7/20.     Today is the first time I am seeing the patient. I have reviewed his medical chart in detail.  I am concerned that he has underlying osteomyelitis of the left great toe.  I am able to palpate bone underlying the necrotic tissue on the top of his toe.  No drainage to culture.  Therefore, I will be ordering blood work and an MRI of his left foot. I discussed with him the treatment options if the MRI shows osteomyelitis of the toe.   I will include an A1c in his blood work as he has been told in the past that he is a borderline diabetic.     Until the results of the lab work and MRI are back, patient is to dress his toe consisting of Santyl, 4x4s, and gauze every day. He will also be receiving supplies I am ordering to change his dressings. Supplies will include Iodoflex and patient will begin dressing his toe using Iodoflex every other day, if he is not able to afford the santyl medication.        2/18:  Patient's wound has improved since last week.  Patient was approved for an MRI, and instructed to schedule with [facility] imaging.  My office called  him in order to schedule, but he had not returned the call.  Therefore, I had patient go directly to estimate imaging following this appointment in order to schedule his MRI.  Patient has been dressing with Iodosorb and dressing supplies.   He states he finished his oral antibiotics prescribed by Dr. Cruz this past Saturday. Patient to continue dressing with Iodosorb and dressing supplies.      2/24:  MRI came back showing osteomyelitis   1. Osteomyelitis of left great toe proximal phalangeal head.  2. Confluent low T1 bone marrow signal alteration affecting left great toe distal phalangeal base also suspicious for osteomyelitis given extent.  Bone marrow signal alteration related to healed fracture is felt less likely.  3. Negative for soft tissue abscess.  Patient referred to Infectious Disease for IV antibiotics.  Discussed patient with Dr. Field.  Bone biopsy taken by Dr. Gunner Rizvi on Friday, February 21st.  I was not able to do bone biopsy, due to being in wound clinic at the time.    Awaiting bone biopsy results.  Patient to continue Iodosorb dressing changes and follow up in 1 week. Debrided today.  Wound has improved.  Bone is still palpable.     3/3:  Patient currently receiving IV antibiotics in addition to oral antibiotics from Infectious Disease. Wound is improving.  Continue with Iodosorb.  Debrided today.     3/10:  Patient is still receiving IV antibiotics in addition to oral antibiotics from Infectious Disease.  Wound has improved again from last week.  Continue with Iodosorb.  Debrided today.  The toe itself has erythema and a mild increase in temperature.  I discussed with Dr. Alfredo who is treating patient for his osteomyelitis.  Picture in chart.  Left great toenail was removed today due to over 50% of the nail bed showing subungual hematoma.     3/17:   Wound has improved again from last week.  Continue with Iodosorb.  Debrided today.  The erythema has improved since last week.  Left great  toenail avulsion is healing normally.  Follow up in 1 week. '     3/24: Wound has improved again from last week.  Continue with Iodosorb.  Debrided today.  The erythema has improved since last week.  Left great toenail avulsion is healing normally.  Follow up in 1 week. '     3/31:  Wound is almost closed. Continue with Iodosorb.  Debrided today.  The erythema has improved since last week.  Left great toenail avulsion is healing normally.  Follow up in 1 week.      4/7: Wound has improved. Continue with Iodosorb.  Debrided today.  The erythema has improved since last week.  Left great toenail avulsion is healing normally.  Follow up in 1 week.      4/14:  Wound is nearly healed.  Continue with Iodosorb.  Debrided today.  The erythema has improved since last week.  Left great toenail avulsion is healing normally.  Follow up in 1 week.     4/21:  Continue with collagen.  Debrided today.  Erythema has continued to decrease.  Patient's last infusion is tomorrow.  It does not appear infected.  Wound should be healed by next week.  Each week a continues to decreased in size.    4/28:  Patient to begin painting Betadine on wound.  Skin is overlying wound, but skin is very tenuous and friable at this point.  Patient to paint Betadine for 2 weeks.  Follow up in 2 weeks. Debrided today.  Vitals:    04/28/20 0826   BP: 122/78   Pulse: 78   Resp: 16   Temp: 97.2 °F (36.2 °C)     Shoe Size:     Past Surgical History:   Procedure Laterality Date    APPENDECTOMY      INCISION AND DRAINAGE Left 12/18/2019    Procedure: INCISION AND DRAINAGE;  Surgeon: Nick Martinez MD;  Location: [facility] OR;  Service: Orthopedics;  Laterality: Left;    REPAIR OF EXTENSOR TENDON Left 12/18/2019    Procedure: REPAIR, TENDON, EXTENSOR;  Surgeon: Nick Martinez MD;  Location: [facility] OR;  Service: Orthopedics;  Laterality: Left;  PRACHI ANGEL     History reviewed. No pertinent past medical history.  Family History   Problem Relation  Age of Onset    Diabetes Father         Social History:   Marital Status: Single  Alcohol History:  reports that he drank alcohol.  Tobacco History:  reports that he has never smoked. He has never used smokeless tobacco.  Drug History:  reports that he does not use drugs.    Review of patient's allergies indicates:  No Known Allergies    Current Outpatient Medications   Medication Sig Dispense Refill    aspirin (ECOTRIN) 81 MG EC tablet Take 1 tablet by mouth once daily.      doxycycline (VIBRAMYCIN) 100 MG Cap Take 1 capsule (100 mg total) by mouth every 12 (twelve) hours. 60 capsule 2    triamcinolone acetonide 0.1% (KENALOG) 0.1 % cream 1 application to affected area       No current facility-administered medications for this visit.      Facility-Administered Medications Ordered in Other Visits   Medication Dose Route Frequency Provider Last Rate Last Dose    heparin, porcine (PF) 100 unit/mL injection flush 300 Units  300 Units Intravenous PRPRITI Alfredo MD   300 Units at 04/03/20 0755    sodium chloride 0.9% flush 10 mL  10 mL Intravenous PRN Zuly Alfredo MD           Review of Systems      Objective:        Physical Exam:   Foot Exam  Physical Exam  Much of the documentation for this visit was completed in the Wound Docs system.  Please see the attached documentation for further details about the patient's care. Scanned under the Media tab.        Karis Rizvi DPM   Imaging:            Assessment:       1. 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    Procedures - Much of the documentation for this visit was completed in the Wound Docs system.  Please see the attached documentation for further details about the patient's care. Scanned under the Media tab.         Karis Rizvi DPM     Counseling:     I provided patient education verbally regarding:   Patient diagnosis, treatment options, as well as alternatives, risks, and benefits.     This note was created using Dragon voice recognition software that occasionally misinterpreted phrases or words.

## 2020-05-12 ENCOUNTER — OFFICE VISIT (OUTPATIENT)
Dept: WOUND CARE | Facility: HOSPITAL | Age: 80
End: 2020-05-12
Attending: PODIATRIST
Payer: MEDICARE

## 2020-05-12 VITALS — SYSTOLIC BLOOD PRESSURE: 130 MMHG | DIASTOLIC BLOOD PRESSURE: 84 MMHG | RESPIRATION RATE: 17 BRPM | TEMPERATURE: 98 F

## 2020-05-12 DIAGNOSIS — L97.522 ISCHEMIC ULCER OF FOOT, LEFT, WITH FAT LAYER EXPOSED: Primary | ICD-10-CM

## 2020-05-12 PROCEDURE — 1126F PR PAIN SEVERITY QUANTIFIED, NO PAIN PRESENT: ICD-10-PCS | Mod: ,,, | Performed by: PODIATRIST

## 2020-05-12 PROCEDURE — 1159F MED LIST DOCD IN RCRD: CPT | Mod: ,,, | Performed by: PODIATRIST

## 2020-05-12 PROCEDURE — 99212 OFFICE O/P EST SF 10 MIN: CPT | Performed by: PODIATRIST

## 2020-05-12 PROCEDURE — 99213 PR OFFICE/OUTPT VISIT, EST, LEVL III, 20-29 MIN: ICD-10-PCS | Mod: ,,, | Performed by: PODIATRIST

## 2020-05-12 PROCEDURE — 1159F PR MEDICATION LIST DOCUMENTED IN MEDICAL RECORD: ICD-10-PCS | Mod: ,,, | Performed by: PODIATRIST

## 2020-05-12 PROCEDURE — 1126F AMNT PAIN NOTED NONE PRSNT: CPT | Mod: ,,, | Performed by: PODIATRIST

## 2020-05-12 PROCEDURE — 99213 OFFICE O/P EST LOW 20 MIN: CPT | Mod: ,,, | Performed by: PODIATRIST

## 2020-05-12 PROCEDURE — 1101F PT FALLS ASSESS-DOCD LE1/YR: CPT | Mod: ,,, | Performed by: PODIATRIST

## 2020-05-12 PROCEDURE — 1101F PR PT FALLS ASSESS DOC 0-1 FALLS W/OUT INJ PAST YR: ICD-10-PCS | Mod: ,,, | Performed by: PODIATRIST

## 2020-05-12 NOTE — PROGRESS NOTES
[address] [facility] [address]  [place] [address]  Phone: [phone]   [phone]    Patient's PCP:Raphael Vargas MD  Referring Provider: No ref. provider found    Subjective:      Chief Complaint:: Wound Care    HPI  Kartik Hermosillo is a 79 y.o. male who presents with a complaint of a wound on his left great toe lasting for 8 weeks. Onset of the symptoms was open fracture of left great toe, which was surgically repaired by Dr. Martinez.  However, patient went on to develop a wound of the left great toe.  Current symptoms include pain and eschar.   Symptoms have increased. Treatment to date have included cleaning with normal saline and leaving it open to air, with no dressing.  He states this is what he was instructed to do.       He was referred to the wound Care Center by Dr. Martinez. He was placed on oral clindamycin at his last visit with Dr. Martinez on 2/7/20.     Today is the first time I am seeing the patient. I have reviewed his medical chart in detail.  I am concerned that he has underlying osteomyelitis of the left great toe.  I am able to palpate bone underlying the necrotic tissue on the top of his toe.  No drainage to culture.  Therefore, I will be ordering blood work and an MRI of his left foot. I discussed with him the treatment options if the MRI shows osteomyelitis of the toe.   I will include an A1c in his blood work as he has been told in the past that he is a borderline diabetic.     Until the results of the lab work and MRI are back, patient is to dress his toe consisting of Santyl, 4x4s, and gauze every day. He will also be receiving supplies I am ordering to change his dressings. Supplies will include Iodoflex and patient will begin dressing his toe using Iodoflex every other day, if he is not able to afford the santyl medication.        2/18:  Patient's wound has improved since last week.  Patient was approved for an MRI, and instructed to schedule with [facility] imaging.  My office called  him in order to schedule, but he had not returned the call.  Therefore, I had patient go directly to estimate imaging following this appointment in order to schedule his MRI.  Patient has been dressing with Iodosorb and dressing supplies.   He states he finished his oral antibiotics prescribed by Dr. Cruz this past Saturday. Patient to continue dressing with Iodosorb and dressing supplies.      2/24:  MRI came back showing osteomyelitis   1. Osteomyelitis of left great toe proximal phalangeal head.  2. Confluent low T1 bone marrow signal alteration affecting left great toe distal phalangeal base also suspicious for osteomyelitis given extent.  Bone marrow signal alteration related to healed fracture is felt less likely.  3. Negative for soft tissue abscess.  Patient referred to Infectious Disease for IV antibiotics.  Discussed patient with Dr. Field.  Bone biopsy taken by Dr. Gunner Rizvi on Friday, February 21st.  I was not able to do bone biopsy, due to being in wound clinic at the time.    Awaiting bone biopsy results.  Patient to continue Iodosorb dressing changes and follow up in 1 week. Debrided today.  Wound has improved.  Bone is still palpable.     3/3:  Patient currently receiving IV antibiotics in addition to oral antibiotics from Infectious Disease. Wound is improving.  Continue with Iodosorb.  Debrided today.     3/10:  Patient is still receiving IV antibiotics in addition to oral antibiotics from Infectious Disease.  Wound has improved again from last week.  Continue with Iodosorb.  Debrided today.  The toe itself has erythema and a mild increase in temperature.  I discussed with Dr. Alfredo who is treating patient for his osteomyelitis.  Picture in chart.  Left great toenail was removed today due to over 50% of the nail bed showing subungual hematoma.     3/17:   Wound has improved again from last week.  Continue with Iodosorb.  Debrided today.  The erythema has improved since last week.  Left great  toenail avulsion is healing normally.  Follow up in 1 week. '     3/24: Wound has improved again from last week.  Continue with Iodosorb.  Debrided today.  The erythema has improved since last week.  Left great toenail avulsion is healing normally.  Follow up in 1 week. '     3/31:  Wound is almost closed. Continue with Iodosorb.  Debrided today.  The erythema has improved since last week.  Left great toenail avulsion is healing normally.  Follow up in 1 week.      4/7: Wound has improved. Continue with Iodosorb.  Debrided today.  The erythema has improved since last week.  Left great toenail avulsion is healing normally.  Follow up in 1 week.      4/14:  Wound is nearly healed.  Continue with Iodosorb.  Debrided today.  The erythema has improved since last week.  Left great toenail avulsion is healing normally.  Follow up in 1 week.     4/21:  Continue with collagen.  Debrided today.  Erythema has continued to decrease.  Patient's last infusion is tomorrow.  It does not appear infected.  Wound should be healed by next week.  Each week a continues to decreased in size.     4/28:  Patient to begin painting Betadine on wound.  Skin is overlying wound, but skin is very tenuous and friable at this point.  Patient to paint Betadine for 2 weeks.  Follow up in 2 weeks. Debrided today.    5/12:  Patient is now healed. Follow up PRN.    Vitals:    05/12/20 0839   BP: 130/84   Resp: 17   Temp: 97.6 °F (36.4 °C)     Shoe Size:     Past Surgical History:   Procedure Laterality Date    APPENDECTOMY      INCISION AND DRAINAGE Left 12/18/2019    Procedure: INCISION AND DRAINAGE;  Surgeon: Nick Martinez MD;  Location: [facility] OR;  Service: Orthopedics;  Laterality: Left;    REPAIR OF EXTENSOR TENDON Left 12/18/2019    Procedure: REPAIR, TENDON, EXTENSOR;  Surgeon: Nick Martinez MD;  Location: [facility] OR;  Service: Orthopedics;  Laterality: Left;  STANLEY JERGENS LAZ     No past medical history on file.  Family History    Problem Relation Age of Onset    Diabetes Father         Social History:   Marital Status: Single  Alcohol History:  reports that he drank alcohol.  Tobacco History:  reports that he has never smoked. He has never used smokeless tobacco.  Drug History:  reports that he does not use drugs.    Review of patient's allergies indicates:  No Known Allergies    Current Outpatient Medications   Medication Sig Dispense Refill    aspirin (ECOTRIN) 81 MG EC tablet Take 1 tablet by mouth once daily.      doxycycline (VIBRAMYCIN) 100 MG Cap Take 1 capsule (100 mg total) by mouth every 12 (twelve) hours. (Patient not taking: Reported on 5/12/2020) 60 capsule 2    triamcinolone acetonide 0.1% (KENALOG) 0.1 % cream 1 application to affected area       No current facility-administered medications for this visit.      Facility-Administered Medications Ordered in Other Visits   Medication Dose Route Frequency Provider Last Rate Last Dose    heparin, porcine (PF) 100 unit/mL injection flush 300 Units  300 Units Intravenous PRN Zuly Alfredo MD   300 Units at 04/03/20 0755    sodium chloride 0.9% flush 10 mL  10 mL Intravenous PRN Zuly Alfredo MD           Review of Systems      Objective:        Physical Exam:   Foot Exam  Physical Exam  Much of the documentation for this visit was completed in the Wound Docs system.  Please see the attached documentation for further details about the patient's care. Scanned under the Media tab.        Karis Rizvi DPM   Imaging:            Assessment:       1. Ischemic ulcer of foot, left, with fat layer exposed      Plan:   Ischemic ulcer of foot, left, with fat layer exposed      Follow up if symptoms worsen or fail to improve, for wound care.    Procedures - None    Counseling:     I provided patient education verbally regarding:   Patient diagnosis, treatment options, as well as alternatives, risks, and benefits.     This note was created using Dragon voice recognition software that  occasionally misinterpreted phrases or words.

## 2020-09-04 ENCOUNTER — TELEPHONE (OUTPATIENT)
Dept: FAMILY MEDICINE | Facility: CLINIC | Age: 80
End: 2020-09-04

## 2020-09-04 NOTE — TELEPHONE ENCOUNTER
Left a message for patient to call back regarding his appointment with Dr. Vargas next week. Need to offer the patient a virtual or audio appointment.

## 2021-01-01 ENCOUNTER — HOSPITAL ENCOUNTER (EMERGENCY)
Facility: HOSPITAL | Age: 81
End: 2021-09-20
Attending: EMERGENCY MEDICINE
Payer: OTHER GOVERNMENT

## 2021-01-01 DIAGNOSIS — I46.9 CARDIAC ARREST: Primary | ICD-10-CM

## 2021-01-01 PROCEDURE — 99900035 HC TECH TIME PER 15 MIN (STAT)

## 2021-01-01 PROCEDURE — 25000003 PHARM REV CODE 250: Performed by: EMERGENCY MEDICINE

## 2021-01-01 PROCEDURE — 99291 CRITICAL CARE FIRST HOUR: CPT | Mod: 25

## 2021-01-01 PROCEDURE — 63600175 PHARM REV CODE 636 W HCPCS: Performed by: EMERGENCY MEDICINE

## 2021-01-01 PROCEDURE — 96375 TX/PRO/DX INJ NEW DRUG ADDON: CPT

## 2021-01-01 PROCEDURE — 96374 THER/PROPH/DIAG INJ IV PUSH: CPT

## 2021-01-01 RX ORDER — SODIUM BICARBONATE 1 MEQ/ML
SYRINGE (ML) INTRAVENOUS CODE/TRAUMA/SEDATION MEDICATION
Status: COMPLETED | OUTPATIENT
Start: 2021-01-01 | End: 2021-01-01

## 2021-01-01 RX ORDER — EPINEPHRINE 0.1 MG/ML
INJECTION INTRAVENOUS CODE/TRAUMA/SEDATION MEDICATION
Status: COMPLETED | OUTPATIENT
Start: 2021-01-01 | End: 2021-01-01

## 2021-01-01 RX ORDER — CALCIUM CHLORIDE INJECTION 100 MG/ML
INJECTION, SOLUTION INTRAVENOUS CODE/TRAUMA/SEDATION MEDICATION
Status: COMPLETED | OUTPATIENT
Start: 2021-01-01 | End: 2021-01-01

## 2021-01-01 RX ADMIN — SODIUM BICARBONATE 50 MEQ: 84 INJECTION INTRAVENOUS at 05:09

## 2021-01-01 RX ADMIN — EPINEPHRINE 1 MG: 0.1 INJECTION, SOLUTION ENDOTRACHEAL; INTRACARDIAC; INTRAVENOUS at 05:09

## 2021-01-01 RX ADMIN — SODIUM CHLORIDE 1000 ML: 0.9 INJECTION, SOLUTION INTRAVENOUS at 05:09

## 2021-01-01 RX ADMIN — CALCIUM CHLORIDE 1 G: 100 INJECTION, SOLUTION INTRAVENOUS at 05:09

## 2021-09-28 ENCOUNTER — TELEPHONE (OUTPATIENT)
Dept: FAMILY MEDICINE | Facility: CLINIC | Age: 81
End: 2021-09-28

## 2022-07-01 NOTE — PATIENT INSTRUCTIONS
Please see wound care instructions which have been provided separately.      TDD of  110-130 units   May benefit from Metformin- can consider in the future

## (undated) DEVICE — PREP CHLORA 26ML

## (undated) DEVICE — SUTURE ETHILON 4-0 PS-2 18 1667H

## (undated) DEVICE — SHOE POST OP MALE LARGE

## (undated) DEVICE — GAUZE VASELINE XEROFORM 5X9 8884433605

## (undated) DEVICE — GOWN X-LARGE 044674

## (undated) DEVICE — TRAY SKIN PREP DRY

## (undated) DEVICE — BANDAGE ESMARK 4X9 55514

## (undated) DEVICE — BANDAGE COBAN 4 TAN

## (undated) DEVICE — TRAY UPPER EXTREMITY

## (undated) DEVICE — PAD BOVIE ADULT

## (undated) DEVICE — GLOVE BIOGEL PI   GOLD SIZE 8

## (undated) DEVICE — SPONGE GAUZE 10S 4X4  442214

## (undated) DEVICE — BANDAGE SOFTBAND 2 441504

## (undated) DEVICE — CUFF TOURNIQUET 18DUAL PRT 5921-218-235

## (undated) DEVICE — BANDAGE ACE 2 STERILE VELCRO REB3112

## (undated) DEVICE — COVER LIGHT HANDLE LB53

## (undated) DEVICE — DRAPE U-SLOT 1019

## (undated) DEVICE — SOLUTION IRRI NS BOTTLE 1000ML R5200-01